# Patient Record
Sex: MALE | Race: WHITE | NOT HISPANIC OR LATINO | Employment: OTHER | URBAN - METROPOLITAN AREA
[De-identification: names, ages, dates, MRNs, and addresses within clinical notes are randomized per-mention and may not be internally consistent; named-entity substitution may affect disease eponyms.]

---

## 2024-10-06 ENCOUNTER — HOSPITAL ENCOUNTER (EMERGENCY)
Facility: HOSPITAL | Age: 69
Discharge: NURSING, REHAB OR LONG-TERM CARE | End: 2024-10-06
Admitting: EMERGENCY MEDICINE
Payer: MEDICAID

## 2024-10-06 ENCOUNTER — APPOINTMENT (OUTPATIENT)
Dept: CT IMAGING | Facility: HOSPITAL | Age: 69
End: 2024-10-06
Payer: MEDICAID

## 2024-10-06 VITALS
DIASTOLIC BLOOD PRESSURE: 92 MMHG | WEIGHT: 156.09 LBS | RESPIRATION RATE: 14 BRPM | OXYGEN SATURATION: 99 % | SYSTOLIC BLOOD PRESSURE: 149 MMHG | HEIGHT: 68 IN | BODY MASS INDEX: 23.66 KG/M2 | HEART RATE: 82 BPM | TEMPERATURE: 97.5 F
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
## 2024-10-06 DIAGNOSIS — S00.81XA ABRASION OF FOREHEAD, INITIAL ENCOUNTER: ICD-10-CM

## 2024-10-06 LAB
ALBUMIN SERPL-MCNC: 3.1 G/DL (ref 3.5–5.2)
ALBUMIN/GLOB SERPL: 1.1 G/DL
ALP SERPL-CCNC: 83 U/L (ref 39–117)
ALT SERPL W P-5'-P-CCNC: <5 U/L (ref 1–41)
AMPHET+METHAMPHET UR QL: NEGATIVE
AMPHETAMINES UR QL: NEGATIVE
ANION GAP SERPL CALCULATED.3IONS-SCNC: 10.3 MMOL/L (ref 5–15)
AST SERPL-CCNC: 23 U/L (ref 1–40)
BARBITURATES UR QL SCN: NEGATIVE
BASOPHILS # BLD AUTO: 0.06 10*3/MM3 (ref 0–0.2)
BASOPHILS NFR BLD AUTO: 0.8 % (ref 0–1.5)
BENZODIAZ UR QL SCN: NEGATIVE
BILIRUB SERPL-MCNC: 0.4 MG/DL (ref 0–1.2)
BILIRUB UR QL STRIP: NEGATIVE
BUN SERPL-MCNC: 8 MG/DL (ref 8–23)
BUN/CREAT SERPL: 7.1 (ref 7–25)
BUPRENORPHINE SERPL-MCNC: NEGATIVE NG/ML
CALCIUM SPEC-SCNC: 8.6 MG/DL (ref 8.6–10.5)
CANNABINOIDS SERPL QL: NEGATIVE
CHLORIDE SERPL-SCNC: 107 MMOL/L (ref 98–107)
CLARITY UR: CLEAR
CO2 SERPL-SCNC: 24.7 MMOL/L (ref 22–29)
COCAINE UR QL: NEGATIVE
COLOR UR: YELLOW
CREAT SERPL-MCNC: 1.12 MG/DL (ref 0.76–1.27)
DEPRECATED RDW RBC AUTO: 56.3 FL (ref 37–54)
EGFRCR SERPLBLD CKD-EPI 2021: 70.7 ML/MIN/1.73
EOSINOPHIL # BLD AUTO: 0.03 10*3/MM3 (ref 0–0.4)
EOSINOPHIL NFR BLD AUTO: 0.4 % (ref 0.3–6.2)
ERYTHROCYTE [DISTWIDTH] IN BLOOD BY AUTOMATED COUNT: 16.7 % (ref 12.3–15.4)
ETHANOL UR QL: <0.01 %
GLOBULIN UR ELPH-MCNC: 2.8 GM/DL
GLUCOSE SERPL-MCNC: 115 MG/DL (ref 65–99)
GLUCOSE UR STRIP-MCNC: NEGATIVE MG/DL
HCT VFR BLD AUTO: 29 % (ref 37.5–51)
HGB BLD-MCNC: 8.9 G/DL (ref 13–17.7)
HGB UR QL STRIP.AUTO: NEGATIVE
IMM GRANULOCYTES # BLD AUTO: 0.05 10*3/MM3 (ref 0–0.05)
IMM GRANULOCYTES NFR BLD AUTO: 0.6 % (ref 0–0.5)
KETONES UR QL STRIP: NEGATIVE
LEUKOCYTE ESTERASE UR QL STRIP.AUTO: NEGATIVE
LYMPHOCYTES # BLD AUTO: 1.16 10*3/MM3 (ref 0.7–3.1)
LYMPHOCYTES NFR BLD AUTO: 14.9 % (ref 19.6–45.3)
MCH RBC QN AUTO: 28.5 PG (ref 26.6–33)
MCHC RBC AUTO-ENTMCNC: 30.7 G/DL (ref 31.5–35.7)
MCV RBC AUTO: 92.9 FL (ref 79–97)
METHADONE UR QL SCN: NEGATIVE
MONOCYTES # BLD AUTO: 0.69 10*3/MM3 (ref 0.1–0.9)
MONOCYTES NFR BLD AUTO: 8.9 % (ref 5–12)
NEUTROPHILS NFR BLD AUTO: 5.8 10*3/MM3 (ref 1.7–7)
NEUTROPHILS NFR BLD AUTO: 74.4 % (ref 42.7–76)
NITRITE UR QL STRIP: NEGATIVE
NRBC BLD AUTO-RTO: 0 /100 WBC (ref 0–0.2)
OPIATES UR QL: NEGATIVE
OXYCODONE UR QL SCN: NEGATIVE
PCP UR QL SCN: NEGATIVE
PH UR STRIP.AUTO: 8 [PH] (ref 5–8)
PLATELET # BLD AUTO: 308 10*3/MM3 (ref 140–450)
PMV BLD AUTO: 11.8 FL (ref 6–12)
POTASSIUM SERPL-SCNC: 3.4 MMOL/L (ref 3.5–5.2)
PROT SERPL-MCNC: 5.9 G/DL (ref 6–8.5)
PROT UR QL STRIP: ABNORMAL
RBC # BLD AUTO: 3.12 10*6/MM3 (ref 4.14–5.8)
SODIUM SERPL-SCNC: 142 MMOL/L (ref 136–145)
SP GR UR STRIP: 1.01 (ref 1–1.03)
TRICYCLICS UR QL SCN: NEGATIVE
UROBILINOGEN UR QL STRIP: ABNORMAL
WBC NRBC COR # BLD AUTO: 7.79 10*3/MM3 (ref 3.4–10.8)

## 2024-10-06 PROCEDURE — P9612 CATHETERIZE FOR URINE SPEC: HCPCS

## 2024-10-06 PROCEDURE — 80053 COMPREHEN METABOLIC PANEL: CPT

## 2024-10-06 PROCEDURE — 99284 EMERGENCY DEPT VISIT MOD MDM: CPT

## 2024-10-06 PROCEDURE — 80306 DRUG TEST PRSMV INSTRMNT: CPT

## 2024-10-06 PROCEDURE — 82077 ASSAY SPEC XCP UR&BREATH IA: CPT

## 2024-10-06 PROCEDURE — 36415 COLL VENOUS BLD VENIPUNCTURE: CPT

## 2024-10-06 PROCEDURE — 85025 COMPLETE CBC W/AUTO DIFF WBC: CPT

## 2024-10-06 PROCEDURE — 70450 CT HEAD/BRAIN W/O DYE: CPT

## 2024-10-06 PROCEDURE — 81003 URINALYSIS AUTO W/O SCOPE: CPT

## 2024-10-06 PROCEDURE — 72125 CT NECK SPINE W/O DYE: CPT

## 2024-10-06 NOTE — ED PROVIDER NOTES
Subjective   History of Present Illness  Patient is a 69-year-old male presenting to ED via EMS from [facility] for multiple unwitnessed falls today.  It is reported that he has fallen 3 times today, one occurrence being outside the facility.  They report a history of alcoholism and are worried he has been drinking.  They are unsure what his baseline is as he is new to the facility.  Patient does have abrasions on the right side of his forehead.  However review of systems is very limited as he is only oriented to self.  He denies any current pain, headache, dizziness.  He is not on blood thinners.        Review of Systems   Unable to perform ROS: Mental status change       No past medical history on file.    No Known Allergies    No past surgical history on file.    No family history on file.    Social History     Socioeconomic History    Marital status:            Objective   Physical Exam  Constitutional:       General: He is not in acute distress.     Appearance: He is not ill-appearing.   HENT:      Head: No raccoon eyes or Hanks's sign.        Comments: Quarter sized abrasion noted above, hemostasis achieved.  No open lacerations or surrounding erythema, edema, ecchymosis.     Right Ear: Drainage present. No tenderness. There is impacted cerumen. Tympanic membrane is not erythematous.      Left Ear: Tympanic membrane normal.      Mouth/Throat:      Mouth: Mucous membranes are moist.   Eyes:      Extraocular Movements: Extraocular movements intact.      Pupils: Pupils are equal, round, and reactive to light.   Cardiovascular:      Rate and Rhythm: Normal rate and regular rhythm.      Pulses: Normal pulses.      Heart sounds: Normal heart sounds.   Pulmonary:      Effort: Pulmonary effort is normal.      Breath sounds: Normal breath sounds.   Abdominal:      General: Abdomen is flat. Bowel sounds are normal.      Palpations: Abdomen is soft.      Tenderness: There is no abdominal tenderness.  "  Musculoskeletal:         General: Normal range of motion.      Cervical back: Normal range of motion. No tenderness.      Right lower leg: No edema.      Left lower leg: No edema.   Skin:     General: Skin is warm and dry.      Capillary Refill: Capillary refill takes less than 2 seconds.   Neurological:      Mental Status: He is alert. He is disoriented.      Sensory: No sensory deficit.      Motor: No weakness.      Coordination: Coordination is intact.      Comments: Oriented to self only   Psychiatric:         Mood and Affect: Mood and affect normal.         Speech: Speech normal.         Behavior: Behavior normal.      Comments: Aware he has some injuries, states they are from working on large rocks that have exploded.         Procedures           ED Course  ED Course as of 10/06/24 1910   Sun Oct 06, 2024   1618 Waiting for patient to go to CT. [EC]   1713 Waiting for CT to be read. [EC]      ED Course User Index  [EC] Miranda Landaverde PA-C      /92   Pulse 82   Temp 97.5 °F (36.4 °C) (Oral)   Resp 14   Ht 172.7 cm (68\")   Wt 70.8 kg (156 lb 1.4 oz)   SpO2 99%   BMI 23.73 kg/m²   Labs Reviewed   COMPREHENSIVE METABOLIC PANEL - Abnormal; Notable for the following components:       Result Value    Glucose 115 (*)     Potassium 3.4 (*)     Total Protein 5.9 (*)     Albumin 3.1 (*)     All other components within normal limits    Narrative:     GFR Normal >60  Chronic Kidney Disease <60  Kidney Failure <15     URINALYSIS W/ MICROSCOPIC IF INDICATED (NO CULTURE) - Abnormal; Notable for the following components:    Protein, UA Trace (*)     All other components within normal limits    Narrative:     Urine microscopic not indicated.   CBC WITH AUTO DIFFERENTIAL - Abnormal; Notable for the following components:    RBC 3.12 (*)     Hemoglobin 8.9 (*)     Hematocrit 29.0 (*)     MCHC 30.7 (*)     RDW 16.7 (*)     RDW-SD 56.3 (*)     Lymphocyte % 14.9 (*)     Immature Grans % 0.6 (*)     All other " components within normal limits   URINE DRUG SCREEN - Normal    Narrative:     Cutoff For Drugs Screened:    Amphetamines               500 ng/ml  Barbiturates               200 ng/ml  Benzodiazepines            150 ng/ml  Cocaine                    150 ng/ml  Methadone                  200 ng/ml  Opiates                    100 ng/ml  Phencyclidine               25 ng/ml  THC                         50 ng/ml  Methamphetamine            500 ng/ml  Tricyclic Antidepressants  300 ng/ml  Oxycodone                  100 ng/ml  Buprenorphine               10 ng/ml    The normal value for all drugs tested is negative. This report includes unconfirmed screening results, with the cutoff values listed, to be used for medical treatment purposes only.  Unconfirmed results must not be used for non-medical purposes such as employment or legal testing.  Clinical consideration should be applied to any drug of abuse test, particularly when unconfirmed results are used.    All urine drugs of abuse requests without chain of custody are for medical screening purposes only.  False positives are possible.     ETHANOL    Narrative:     Plasma Ethanol Clinical Symptoms:    ETOH (%)               Clinical Symptom  .01-.05              No apparent influence  .03-.12              Euphoria, Diminished judgment and attention   .09-.25              Impaired comprehension, Muscle incoordination  .18-.30              Confusion, Staggered gait, Slurred speech  .25-.40              Markedly decreased response to stimuli, unable to stand or                        walk, vomitting, sleep or stupor  .35-.50              Comatose, Anesthesia, Subnormal body temperature       CBC AND DIFFERENTIAL    Narrative:     The following orders were created for panel order CBC & Differential.  Procedure                               Abnormality         Status                     ---------                               -----------         ------                      CBC Auto Differential[[id number]]        Abnormal            Final result                 Please view results for these tests on the individual orders.     Medications - No data to display  CT Head Without Contrast    Result Date: 10/6/2024  No acute intracranial pathology. No cervical spine fracture. Electronically Signed: Shahram Rush MD  10/6/2024 5:14 PM EDT  Workstation ID: [id number]    CT Cervical Spine Without Contrast    Result Date: 10/6/2024  No acute intracranial pathology. No cervical spine fracture. Electronically Signed: Shahram Rush MD  10/6/2024 5:14 PM EDT  Workstation ID: [id number]                                          Medical Decision Making  Patient presented to the ED for the above complaint.    Patient underwent the above exam and evaluation.    While in the ED patient was placed in a gown and IV was established.  CT head and neck was obtained to assess for ICH, fracture, dislocation.  Blood work was obtained to assess for intoxication, electrolyte abnormalities, infection.  On reevaluation patient is resting comfortably, denies any current pain.  Blood work and imaging does not warrant admitting patient to hospitalist group.  Patient will be sent back to [facility] as we believe this is his baseline.    Labs were independently interpreted by myself and deemed remarkable for the following: WBC 7.79, hemoglobin 8.9, hematocrit 29.0, glucose 115, potassium 3.4, negative drug screen, negative ethanol, UA showed no signs of infection.  CT head neck independently interpreted by Dr. Simms and reviewed by myself showing: No acute intracranial pathologies, no cervical fractures or dislocations.  Further interpretation by radiologist noted above.    Appropriate PPE was worn during each patient encounter.    Discussed this patient with Dr. Simms who agrees with plan.      Amount and/or Complexity of Data Reviewed  Labs: ordered.  Radiology: ordered.        Final diagnoses:   Fall, initial  encounter   Abrasion of forehead, initial encounter       ED Disposition  ED Disposition       ED Disposition   Discharge    Condition   Stable    Comment   --               Zaki Fontanez MD  [address] [id number]  [phone]    Schedule an appointment as soon as possible for a visit            Medication List      No changes were made to your prescriptions during this visit.            Miranda Landaverde PA-C  10/06/24 3996

## 2024-10-16 ENCOUNTER — APPOINTMENT (OUTPATIENT)
Dept: CT IMAGING | Facility: HOSPITAL | Age: 69
End: 2024-10-16
Payer: MEDICARE

## 2024-10-16 ENCOUNTER — APPOINTMENT (OUTPATIENT)
Dept: GENERAL RADIOLOGY | Facility: HOSPITAL | Age: 69
End: 2024-10-16
Payer: MEDICARE

## 2024-10-16 ENCOUNTER — HOSPITAL ENCOUNTER (INPATIENT)
Facility: HOSPITAL | Age: 69
LOS: 7 days | Discharge: NURSING, REHAB OR LONG-TERM CARE | End: 2024-10-24
Attending: EMERGENCY MEDICINE | Admitting: STUDENT IN AN ORGANIZED HEALTH CARE EDUCATION/TRAINING PROGRAM
Payer: MEDICARE

## 2024-10-16 DIAGNOSIS — R41.82 ALTERED MENTAL STATUS, UNSPECIFIED ALTERED MENTAL STATUS TYPE: Primary | ICD-10-CM

## 2024-10-16 LAB
ALBUMIN SERPL-MCNC: 3.2 G/DL (ref 3.5–5.2)
ALBUMIN/GLOB SERPL: 1.1 G/DL
ALP SERPL-CCNC: 289 U/L (ref 39–117)
ALT SERPL W P-5'-P-CCNC: 18 U/L (ref 1–41)
AMMONIA BLD-SCNC: 34 UMOL/L (ref 16–60)
AMPHET+METHAMPHET UR QL: NEGATIVE
AMPHETAMINES UR QL: NEGATIVE
ANION GAP SERPL CALCULATED.3IONS-SCNC: 12.2 MMOL/L (ref 5–15)
ARTERIAL PATENCY WRIST A: POSITIVE
AST SERPL-CCNC: 24 U/L (ref 1–40)
ATMOSPHERIC PRESS: ABNORMAL MM[HG]
B PARAPERT DNA SPEC QL NAA+PROBE: NOT DETECTED
B PERT DNA SPEC QL NAA+PROBE: NOT DETECTED
BARBITURATES UR QL SCN: NEGATIVE
BASE EXCESS BLDA CALC-SCNC: -0.6 MMOL/L (ref 0–3)
BASOPHILS # BLD AUTO: 0.07 10*3/MM3 (ref 0–0.2)
BASOPHILS NFR BLD AUTO: 0.8 % (ref 0–1.5)
BDY SITE: ABNORMAL
BENZODIAZ UR QL SCN: POSITIVE
BILIRUB SERPL-MCNC: 1.1 MG/DL (ref 0–1.2)
BILIRUB UR QL STRIP: NEGATIVE
BUN SERPL-MCNC: 10 MG/DL (ref 8–23)
BUN/CREAT SERPL: 8.8 (ref 7–25)
BUPRENORPHINE SERPL-MCNC: NEGATIVE NG/ML
C PNEUM DNA NPH QL NAA+NON-PROBE: NOT DETECTED
CALCIUM SPEC-SCNC: 8.8 MG/DL (ref 8.6–10.5)
CANNABINOIDS SERPL QL: NEGATIVE
CHLORIDE SERPL-SCNC: 109 MMOL/L (ref 98–107)
CK SERPL-CCNC: 156 U/L (ref 20–200)
CLARITY UR: CLEAR
CO2 BLDA-SCNC: 23.5 MMOL/L (ref 22–29)
CO2 SERPL-SCNC: 25.8 MMOL/L (ref 22–29)
COCAINE UR QL: NEGATIVE
COLOR UR: YELLOW
CREAT SERPL-MCNC: 1.14 MG/DL (ref 0.76–1.27)
DEPRECATED RDW RBC AUTO: 55.9 FL (ref 37–54)
EGFRCR SERPLBLD CKD-EPI 2021: 69.2 ML/MIN/1.73
EOSINOPHIL # BLD AUTO: 0.15 10*3/MM3 (ref 0–0.4)
EOSINOPHIL NFR BLD AUTO: 1.8 % (ref 0.3–6.2)
ERYTHROCYTE [DISTWIDTH] IN BLOOD BY AUTOMATED COUNT: 15.9 % (ref 12.3–15.4)
FLUAV SUBTYP SPEC NAA+PROBE: NOT DETECTED
FLUBV RNA ISLT QL NAA+PROBE: NOT DETECTED
GLOBULIN UR ELPH-MCNC: 3 GM/DL
GLUCOSE BLDC GLUCOMTR-MCNC: 70 MG/DL (ref 74–100)
GLUCOSE SERPL-MCNC: 68 MG/DL (ref 65–99)
GLUCOSE UR STRIP-MCNC: NEGATIVE MG/DL
HADV DNA SPEC NAA+PROBE: NOT DETECTED
HCO3 BLDA-SCNC: 22.6 MMOL/L (ref 21–28)
HCOV 229E RNA SPEC QL NAA+PROBE: NOT DETECTED
HCOV HKU1 RNA SPEC QL NAA+PROBE: NOT DETECTED
HCOV NL63 RNA SPEC QL NAA+PROBE: NOT DETECTED
HCOV OC43 RNA SPEC QL NAA+PROBE: NOT DETECTED
HCT VFR BLD AUTO: 29.9 % (ref 37.5–51)
HEMODILUTION: NO
HGB BLD-MCNC: 9.2 G/DL (ref 13–17.7)
HGB UR QL STRIP.AUTO: NEGATIVE
HMPV RNA NPH QL NAA+NON-PROBE: NOT DETECTED
HOLD SPECIMEN: NORMAL
HPIV1 RNA ISLT QL NAA+PROBE: NOT DETECTED
HPIV2 RNA SPEC QL NAA+PROBE: NOT DETECTED
HPIV3 RNA NPH QL NAA+PROBE: NOT DETECTED
HPIV4 P GENE NPH QL NAA+PROBE: NOT DETECTED
IMM GRANULOCYTES # BLD AUTO: 0.03 10*3/MM3 (ref 0–0.05)
IMM GRANULOCYTES NFR BLD AUTO: 0.4 % (ref 0–0.5)
INHALED O2 CONCENTRATION: 21 %
KETONES UR QL STRIP: ABNORMAL
LEUKOCYTE ESTERASE UR QL STRIP.AUTO: NEGATIVE
LYMPHOCYTES # BLD AUTO: 1.02 10*3/MM3 (ref 0.7–3.1)
LYMPHOCYTES NFR BLD AUTO: 12.2 % (ref 19.6–45.3)
M PNEUMO IGG SER IA-ACNC: NOT DETECTED
MAGNESIUM SERPL-MCNC: 1.8 MG/DL (ref 1.6–2.4)
MCH RBC QN AUTO: 29.2 PG (ref 26.6–33)
MCHC RBC AUTO-ENTMCNC: 30.8 G/DL (ref 31.5–35.7)
MCV RBC AUTO: 94.9 FL (ref 79–97)
METHADONE UR QL SCN: NEGATIVE
MODALITY: ABNORMAL
MONOCYTES # BLD AUTO: 0.68 10*3/MM3 (ref 0.1–0.9)
MONOCYTES NFR BLD AUTO: 8.1 % (ref 5–12)
NEUTROPHILS NFR BLD AUTO: 6.44 10*3/MM3 (ref 1.7–7)
NEUTROPHILS NFR BLD AUTO: 76.7 % (ref 42.7–76)
NITRITE UR QL STRIP: NEGATIVE
NRBC BLD AUTO-RTO: 0 /100 WBC (ref 0–0.2)
OPIATES UR QL: NEGATIVE
OXYCODONE UR QL SCN: NEGATIVE
PCO2 BLDA: 30.5 MM HG (ref 35–48)
PCP UR QL SCN: NEGATIVE
PH BLDA: 7.48 PH UNITS (ref 7.35–7.45)
PH UR STRIP.AUTO: 7 [PH] (ref 5–8)
PLATELET # BLD AUTO: 226 10*3/MM3 (ref 140–450)
PMV BLD AUTO: 11.6 FL (ref 6–12)
PO2 BLD: 433 MM[HG] (ref 0–500)
PO2 BLDA: 90.9 MM HG (ref 83–108)
POTASSIUM SERPL-SCNC: 4.1 MMOL/L (ref 3.5–5.2)
PROT SERPL-MCNC: 6.2 G/DL (ref 6–8.5)
PROT UR QL STRIP: NEGATIVE
RBC # BLD AUTO: 3.15 10*6/MM3 (ref 4.14–5.8)
RHINOVIRUS RNA SPEC NAA+PROBE: NOT DETECTED
RSV RNA NPH QL NAA+NON-PROBE: NOT DETECTED
SAO2 % BLDCOA: 97.7 % (ref 94–98)
SARS-COV-2 RNA NPH QL NAA+NON-PROBE: NOT DETECTED
SODIUM SERPL-SCNC: 147 MMOL/L (ref 136–145)
SP GR UR STRIP: 1.02 (ref 1–1.03)
T4 FREE SERPL-MCNC: 1.58 NG/DL (ref 0.93–1.7)
TRICYCLICS UR QL SCN: POSITIVE
TSH SERPL DL<=0.05 MIU/L-ACNC: 2.2 UIU/ML (ref 0.27–4.2)
UROBILINOGEN UR QL STRIP: ABNORMAL
WBC NRBC COR # BLD AUTO: 8.39 10*3/MM3 (ref 3.4–10.8)
WHOLE BLOOD HOLD COAG: NORMAL

## 2024-10-16 PROCEDURE — 84439 ASSAY OF FREE THYROXINE: CPT | Performed by: EMERGENCY MEDICINE

## 2024-10-16 PROCEDURE — 81003 URINALYSIS AUTO W/O SCOPE: CPT | Performed by: EMERGENCY MEDICINE

## 2024-10-16 PROCEDURE — 36600 WITHDRAWAL OF ARTERIAL BLOOD: CPT | Performed by: EMERGENCY MEDICINE

## 2024-10-16 PROCEDURE — 80053 COMPREHEN METABOLIC PANEL: CPT | Performed by: EMERGENCY MEDICINE

## 2024-10-16 PROCEDURE — 82550 ASSAY OF CK (CPK): CPT | Performed by: EMERGENCY MEDICINE

## 2024-10-16 PROCEDURE — 82803 BLOOD GASES ANY COMBINATION: CPT | Performed by: EMERGENCY MEDICINE

## 2024-10-16 PROCEDURE — 84443 ASSAY THYROID STIM HORMONE: CPT | Performed by: EMERGENCY MEDICINE

## 2024-10-16 PROCEDURE — 70450 CT HEAD/BRAIN W/O DYE: CPT

## 2024-10-16 PROCEDURE — 85025 COMPLETE CBC W/AUTO DIFF WBC: CPT | Performed by: EMERGENCY MEDICINE

## 2024-10-16 PROCEDURE — 83735 ASSAY OF MAGNESIUM: CPT | Performed by: EMERGENCY MEDICINE

## 2024-10-16 PROCEDURE — 80306 DRUG TEST PRSMV INSTRMNT: CPT | Performed by: INTERNAL MEDICINE

## 2024-10-16 PROCEDURE — 82948 REAGENT STRIP/BLOOD GLUCOSE: CPT

## 2024-10-16 PROCEDURE — 87040 BLOOD CULTURE FOR BACTERIA: CPT | Performed by: EMERGENCY MEDICINE

## 2024-10-16 PROCEDURE — 87481 CANDIDA DNA AMP PROBE: CPT | Performed by: INTERNAL MEDICINE

## 2024-10-16 PROCEDURE — G0378 HOSPITAL OBSERVATION PER HR: HCPCS

## 2024-10-16 PROCEDURE — 0202U NFCT DS 22 TRGT SARS-COV-2: CPT | Performed by: INTERNAL MEDICINE

## 2024-10-16 PROCEDURE — 99285 EMERGENCY DEPT VISIT HI MDM: CPT

## 2024-10-16 PROCEDURE — 36415 COLL VENOUS BLD VENIPUNCTURE: CPT

## 2024-10-16 PROCEDURE — P9612 CATHETERIZE FOR URINE SPEC: HCPCS

## 2024-10-16 PROCEDURE — 71045 X-RAY EXAM CHEST 1 VIEW: CPT

## 2024-10-16 PROCEDURE — 82140 ASSAY OF AMMONIA: CPT | Performed by: EMERGENCY MEDICINE

## 2024-10-16 RX ORDER — BISACODYL 10 MG
10 SUPPOSITORY, RECTAL RECTAL DAILY PRN
Status: DISCONTINUED | OUTPATIENT
Start: 2024-10-16 | End: 2024-10-24 | Stop reason: HOSPADM

## 2024-10-16 RX ORDER — SODIUM CHLORIDE 9 MG/ML
40 INJECTION, SOLUTION INTRAVENOUS AS NEEDED
Status: DISCONTINUED | OUTPATIENT
Start: 2024-10-16 | End: 2024-10-16

## 2024-10-16 RX ORDER — SODIUM CHLORIDE 0.9 % (FLUSH) 0.9 %
10 SYRINGE (ML) INJECTION EVERY 12 HOURS SCHEDULED
Status: DISCONTINUED | OUTPATIENT
Start: 2024-10-16 | End: 2024-10-16

## 2024-10-16 RX ORDER — DEXTROSE MONOHYDRATE AND SODIUM CHLORIDE 5; .45 G/100ML; G/100ML
75 INJECTION, SOLUTION INTRAVENOUS CONTINUOUS
Status: DISPENSED | OUTPATIENT
Start: 2024-10-16 | End: 2024-10-17

## 2024-10-16 RX ORDER — ONDANSETRON 2 MG/ML
4 INJECTION INTRAMUSCULAR; INTRAVENOUS EVERY 6 HOURS PRN
Status: DISCONTINUED | OUTPATIENT
Start: 2024-10-16 | End: 2024-10-20

## 2024-10-16 RX ORDER — ALUMINA, MAGNESIA, AND SIMETHICONE 2400; 2400; 240 MG/30ML; MG/30ML; MG/30ML
15 SUSPENSION ORAL EVERY 6 HOURS PRN
Status: DISCONTINUED | OUTPATIENT
Start: 2024-10-16 | End: 2024-10-24 | Stop reason: HOSPADM

## 2024-10-16 RX ORDER — ACETAMINOPHEN 160 MG/5ML
650 SOLUTION ORAL EVERY 4 HOURS PRN
Status: DISCONTINUED | OUTPATIENT
Start: 2024-10-16 | End: 2024-10-24 | Stop reason: HOSPADM

## 2024-10-16 RX ORDER — ACETAMINOPHEN 325 MG/1
650 TABLET ORAL EVERY 4 HOURS PRN
Status: DISCONTINUED | OUTPATIENT
Start: 2024-10-16 | End: 2024-10-24 | Stop reason: HOSPADM

## 2024-10-16 RX ORDER — NITROGLYCERIN 0.4 MG/1
0.4 TABLET SUBLINGUAL
Status: DISCONTINUED | OUTPATIENT
Start: 2024-10-16 | End: 2024-10-24 | Stop reason: HOSPADM

## 2024-10-16 RX ORDER — SODIUM CHLORIDE 0.9 % (FLUSH) 0.9 %
10 SYRINGE (ML) INJECTION AS NEEDED
Status: DISCONTINUED | OUTPATIENT
Start: 2024-10-16 | End: 2024-10-24 | Stop reason: HOSPADM

## 2024-10-16 RX ORDER — AMOXICILLIN 250 MG
2 CAPSULE ORAL 2 TIMES DAILY PRN
Status: DISCONTINUED | OUTPATIENT
Start: 2024-10-16 | End: 2024-10-24 | Stop reason: HOSPADM

## 2024-10-16 RX ORDER — NICOTINE POLACRILEX 4 MG
15 LOZENGE BUCCAL
Status: DISCONTINUED | OUTPATIENT
Start: 2024-10-16 | End: 2024-10-24 | Stop reason: HOSPADM

## 2024-10-16 RX ORDER — POLYETHYLENE GLYCOL 3350 17 G/17G
17 POWDER, FOR SOLUTION ORAL DAILY PRN
Status: DISCONTINUED | OUTPATIENT
Start: 2024-10-16 | End: 2024-10-24 | Stop reason: HOSPADM

## 2024-10-16 RX ORDER — ONDANSETRON 4 MG/1
4 TABLET, ORALLY DISINTEGRATING ORAL EVERY 6 HOURS PRN
Status: DISCONTINUED | OUTPATIENT
Start: 2024-10-16 | End: 2024-10-24

## 2024-10-16 RX ORDER — BISACODYL 5 MG/1
5 TABLET, DELAYED RELEASE ORAL DAILY PRN
Status: DISCONTINUED | OUTPATIENT
Start: 2024-10-16 | End: 2024-10-24 | Stop reason: HOSPADM

## 2024-10-16 RX ORDER — DEXTROSE MONOHYDRATE 25 G/50ML
25 INJECTION, SOLUTION INTRAVENOUS
Status: DISCONTINUED | OUTPATIENT
Start: 2024-10-16 | End: 2024-10-24 | Stop reason: HOSPADM

## 2024-10-16 RX ORDER — ACETAMINOPHEN 650 MG/1
650 SUPPOSITORY RECTAL EVERY 4 HOURS PRN
Status: DISCONTINUED | OUTPATIENT
Start: 2024-10-16 | End: 2024-10-24 | Stop reason: HOSPADM

## 2024-10-16 RX ORDER — HYDRALAZINE HYDROCHLORIDE 20 MG/ML
10 INJECTION INTRAMUSCULAR; INTRAVENOUS EVERY 6 HOURS PRN
Status: DISCONTINUED | OUTPATIENT
Start: 2024-10-16 | End: 2024-10-24 | Stop reason: HOSPADM

## 2024-10-16 RX ORDER — SODIUM CHLORIDE 450 MG/100ML
75 INJECTION, SOLUTION INTRAVENOUS CONTINUOUS
Status: DISCONTINUED | OUTPATIENT
Start: 2024-10-16 | End: 2024-10-16

## 2024-10-16 RX ORDER — IBUPROFEN 600 MG/1
1 TABLET ORAL
Status: DISCONTINUED | OUTPATIENT
Start: 2024-10-16 | End: 2024-10-24 | Stop reason: HOSPADM

## 2024-10-16 RX ADMIN — DEXTROSE AND SODIUM CHLORIDE 75 ML/HR: 5; 450 INJECTION, SOLUTION INTRAVENOUS at 20:24

## 2024-10-16 NOTE — LETTER
"    EMS Transport Request  For use at [facility], [place], [place], [place], and [place] only   Patient Name: Shahram Chaney : 1955   Weight:67.3 kg (148 lb 5.9 oz) Pick-up Location: [facility] BLS/ALS: BLS/ALS: BLS   Insurance: MEDICARE Auth End Date:    Pre-Cert #: D/C Summary complete:    Destination: Other [facility].  [facility]  Room    Contact Precautions: {Precautions:48563}   Equipment (O2, Fluids, etc.): {Equipment:82530}   Arrive By Date/Time: *** Stretcher/WC: {Stretcher/WC:63473}   CM Requesting: Elsi Kc RN Ext: ***   Notes/Medical Necessity: ***     ______________________________________________________________________    *Only 2 patient bags OR 1 carry-on size bag are permitted.  Wheelchairs and walkers CANNOT transported with the patient. Acknowledge: {Acknowledge:62600::\"Yes\"}    "

## 2024-10-16 NOTE — Clinical Note
Level of Care: Telemetry [5]   Admitting Physician: JOSE L ZHAO [[id number]]   Attending Physician: JOSE L ZHAO [[id number]]

## 2024-10-16 NOTE — ED PROVIDER NOTES
Subjective   History of Present Illness  Below is from the nursing home as patient is extremely altered    History of present illness 69-year-old gentleman sent in from the nursing home for 3-day history of altered mental status decreased appetite not acting himself.  No recent injury illness has been reported per the staff.  No family is currently present and patient cannot give me any history.  No reported change in medications.      Review of Systems   Unable to perform ROS: Mental status change       No past medical history on file.  Per the nursing home sheet aortic abdominal aneurysm alcohol dependence anxiety hypertension metabolic encephalopathy monoclonal gammopathy unspecified psychosis anemia reflux disease  No Known Allergies    No past surgical history on file.    No family history on file.    Social History     Socioeconomic History    Marital status:      Educations per [facility], Ativan Seroquel aspirin Protonix spironolactone and amantadine atorvastatin mirtazapine losartan melatonin hydralazine Tylenol      Objective   Physical Exam  Constitutional is a 69-year-old awake alert fidgeting all over the bed triage vital signs reviewed blood pressure somewhat elevated.  HEENT extraocular muscles are intact pupils equal round reactive sclera clear I do not appreciate papilledema mouth clear neck supple no adenopathy no meningeal signs no JVD no bruits lungs clear no retraction no use of accessory heart was regular without murmur rub abdomen soft nontender good bowel sounds no peritoneal findings or pulsatile masses extremities pulses equal upper and lower extremities no edema cords or Homans' sign evidence of DVT skin warm dry without rashes or cellulitic changes  examination unremarkable.  Neurologic he is awake alert he is very confused does not answer questions there is no facial asymmetry he does moan at times but there is no slurring of this he has no focal weaknesses he moves  everything constantly in the bed.  Procedures           ED Course      Results for orders placed or performed during the hospital encounter of 10/16/24   Urinalysis With Microscopic If Indicated (No Culture) - Urine, Catheter In/Out    Collection Time: 10/16/24 10:38 AM    Specimen: Urine, Catheter In/Out   Result Value Ref Range    Color, UA Yellow Yellow, Straw    Appearance, UA Clear Clear    pH, UA 7.0 5.0 - 8.0    Specific Gravity, UA 1.016 1.005 - 1.030    Glucose, UA Negative Negative    Ketones, UA 15 mg/dL (1+) (A) Negative    Bilirubin, UA Negative Negative    Blood, UA Negative Negative    Protein, UA Negative Negative    Leuk Esterase, UA Negative Negative    Nitrite, UA Negative Negative    Urobilinogen, UA 1.0 E.U./dL 0.2 - 1.0 E.U./dL   Comprehensive Metabolic Panel    Collection Time: 10/16/24 11:03 AM    Specimen: Arm, Right; Blood   Result Value Ref Range    Glucose 68 65 - 99 mg/dL    BUN 10 8 - 23 mg/dL    Creatinine 1.14 0.76 - 1.27 mg/dL    Sodium 147 (H) 136 - 145 mmol/L    Potassium 4.1 3.5 - 5.2 mmol/L    Chloride 109 (H) 98 - 107 mmol/L    CO2 25.8 22.0 - 29.0 mmol/L    Calcium 8.8 8.6 - 10.5 mg/dL    Total Protein 6.2 6.0 - 8.5 g/dL    Albumin 3.2 (L) 3.5 - 5.2 g/dL    ALT (SGPT) 18 1 - 41 U/L    AST (SGOT) 24 1 - 40 U/L    Alkaline Phosphatase 289 (H) 39 - 117 U/L    Total Bilirubin 1.1 0.0 - 1.2 mg/dL    Globulin 3.0 gm/dL    A/G Ratio 1.1 g/dL    BUN/Creatinine Ratio 8.8 7.0 - 25.0    Anion Gap 12.2 5.0 - 15.0 mmol/L    eGFR 69.2 >60.0 mL/min/1.73   CK    Collection Time: 10/16/24 11:03 AM    Specimen: Arm, Right; Blood   Result Value Ref Range    Creatine Kinase 156 20 - 200 U/L   Ammonia    Collection Time: 10/16/24 11:03 AM    Specimen: Arm, Right; Blood   Result Value Ref Range    Ammonia 34 16 - 60 umol/L   TSH    Collection Time: 10/16/24 11:03 AM    Specimen: Arm, Right; Blood   Result Value Ref Range    TSH 2.200 0.270 - 4.200 uIU/mL   T4, Free    Collection Time: 10/16/24 11:03  AM    Specimen: Arm, Right; Blood   Result Value Ref Range    Free T4 1.58 0.93 - 1.70 ng/dL   Magnesium    Collection Time: 10/16/24 11:03 AM    Specimen: Arm, Right; Blood   Result Value Ref Range    Magnesium 1.8 1.6 - 2.4 mg/dL   CBC Auto Differential    Collection Time: 10/16/24 11:03 AM    Specimen: Arm, Right; Blood   Result Value Ref Range    WBC 8.39 3.40 - 10.80 10*3/mm3    RBC 3.15 (L) 4.14 - 5.80 10*6/mm3    Hemoglobin 9.2 (L) 13.0 - 17.7 g/dL    Hematocrit 29.9 (L) 37.5 - 51.0 %    MCV 94.9 79.0 - 97.0 fL    MCH 29.2 26.6 - 33.0 pg    MCHC 30.8 (L) 31.5 - 35.7 g/dL    RDW 15.9 (H) 12.3 - 15.4 %    RDW-SD 55.9 (H) 37.0 - 54.0 fl    MPV 11.6 6.0 - 12.0 fL    Platelets 226 140 - 450 10*3/mm3    Neutrophil % 76.7 (H) 42.7 - 76.0 %    Lymphocyte % 12.2 (L) 19.6 - 45.3 %    Monocyte % 8.1 5.0 - 12.0 %    Eosinophil % 1.8 0.3 - 6.2 %    Basophil % 0.8 0.0 - 1.5 %    Immature Grans % 0.4 0.0 - 0.5 %    Neutrophils, Absolute 6.44 1.70 - 7.00 10*3/mm3    Lymphocytes, Absolute 1.02 0.70 - 3.10 10*3/mm3    Monocytes, Absolute 0.68 0.10 - 0.90 10*3/mm3    Eosinophils, Absolute 0.15 0.00 - 0.40 10*3/mm3    Basophils, Absolute 0.07 0.00 - 0.20 10*3/mm3    Immature Grans, Absolute 0.03 0.00 - 0.05 10*3/mm3    nRBC 0.0 0.0 - 0.2 /100 WBC   Gold Top - SST    Collection Time: 10/16/24 11:03 AM   Result Value Ref Range    Extra Tube Hold for add-ons.    Light Blue Top    Collection Time: 10/16/24 11:03 AM   Result Value Ref Range    Extra Tube Hold for add-ons.    Blood Gas, Arterial -    Collection Time: 10/16/24  2:26 PM    Specimen: Arterial Blood   Result Value Ref Range    Site Left Radial     Shaan's Test Positive     pH, Arterial 7.477 (H) 7.350 - 7.450 pH units    pCO2, Arterial 30.5 (L) 35.0 - 48.0 mm Hg    pO2, Arterial 90.9 83.0 - 108.0 mm Hg    HCO3, Arterial 22.6 21.0 - 28.0 mmol/L    Base Excess, Arterial -0.6 (L) 0.0 - 3.0 mmol/L    O2 Saturation, Arterial 97.7 94.0 - 98.0 %    CO2 Content 23.5 22 - 29  mmol/L    Barometric Pressure for Blood Gas      Modality Room Air     FIO2 21 %    Hemodilution No     PO2/FIO2 433 0 - 500   POC Glucose Once    Collection Time: 10/16/24  2:26 PM    Specimen: Arterial Blood   Result Value Ref Range    Glucose 70 (L) 74 - 100 mg/dL     CT Head Without Contrast    Result Date: 10/16/2024  Impression: Brain atrophy with chronic small vessel ischemic changes No evidence of acute intracranial abnormality Right mastoid air cell disease Electronically Signed: Chris Martinez MD  10/16/2024 2:49 PM EDT  Workstation ID: [id number]    XR Chest 1 View    Result Date: 10/16/2024  Impression: No acute process. Electronically Signed: Tea Allen MD  10/16/2024 11:20 AM EDT  Workstation ID: [id number]   Medications - No data to display                                 EKG my interpretation normal sinus rhythm rate of 56 normal axis no hypertrophy QTc of 401 Q waves noted in lead III changed from previous EKG on 6/21/2021 abnormal          Medical Decision Making  Medical decision making.  IV established monitor placed my review of sinus rhythm labs obtained by independent reviewed urine was unremarkable comprehensive metabolic profile unremarkable other than a sodium of 147.  Alk phos 289 CK was 156 ammonia normal TSH T4 normal magnesium normal BBC unremarkable and a white count 8.39 hemoglobin 9.2.  Blood gas pH 7.4 pCO2 30 pO2 of 90.  Chest x-ray my independent review no pneumonia pneumothorax failure acute findings radiology review unremarkable CT brain without my independent review no tumors masses hemorrhage or acute findings radiology reviewed showed brain atrophy and chronic small vessel disease no other acute abnormalities right mastoid airspace disease.  Patient repeat exam is unchanged still very confused and fidgeting In bed constantly will not answer questions does not follow commands there is no photophobia neck was supple.  Her vital signs are stable otherwise blood pressure still  little bit elevated at 166/100.  I do not see any evidence of an acute stroke meningitis encephalitis or acute intra-abdominal process or cardiac event cellulitis or sepsis based on my history and physical and clinical finding although not a complete list of all possibilities.  I talked to the hospitalist we discussed the case and the patient will be admitted for further care stable otherwise unremarkable course.    Problems Addressed:  Altered mental status, unspecified altered mental status type: complicated acute illness or injury    Amount and/or Complexity of Data Reviewed  Labs: ordered. Decision-making details documented in ED Course.  Radiology: ordered and independent interpretation performed. Decision-making details documented in ED Course.    Risk  Decision regarding hospitalization.        Final diagnoses:   Altered mental status, unspecified altered mental status type       ED Disposition  ED Disposition       ED Disposition   Decision to Admit    Condition   --    Comment   Level of Care: Telemetry [5]   Admitting Physician: JOSE L ZHAO [[id number]]   Attending Physician: JOSE L ZHAO [[id number]]                 No follow-up provider specified.       Medication List      No changes were made to your prescriptions during this visit.            Olman Finch MD  10/16/24 [id number]

## 2024-10-16 NOTE — LETTER
EMS Transport Request  For use at [facility], [place], [place], [place], and [place] only   Patient Name: Shahram Chaney : 1955   Weight:67.3 kg (148 lb 5.9 oz) Pick-up Location: [facility] BLS/ALS: BLS/ALS: BLS   Insurance: MEDICARE Auth End Date:    Pre-Cert #: D/C Summary complete:    Destination: Other [facility].   Room: [address] [address]   Contact Precautions: Other High Fall Risk   Equipment (O2, Fluids, etc.): None   Arrive By Date/Time: 10/24/24 Stretcher/WC: Stretcher   CM Requesting: Elsi Kc RN Ext: [phone]   Notes/Medical Necessity: Dementia with behavioral disturbance ([place])     ______________________________________________________________________    *Only 2 patient bags OR 1 carry-on size bag are permitted.  Wheelchairs and walkers CANNOT transported with the patient. Acknowledge: Yes

## 2024-10-17 ENCOUNTER — APPOINTMENT (OUTPATIENT)
Dept: CARDIOLOGY | Facility: HOSPITAL | Age: 69
End: 2024-10-17
Payer: MEDICARE

## 2024-10-17 ENCOUNTER — APPOINTMENT (OUTPATIENT)
Dept: MRI IMAGING | Facility: HOSPITAL | Age: 69
End: 2024-10-17
Payer: MEDICARE

## 2024-10-17 PROBLEM — R41.82 ALTERED MENTAL STATUS: Status: ACTIVE | Noted: 2024-10-17

## 2024-10-17 LAB
ANION GAP SERPL CALCULATED.3IONS-SCNC: 13.4 MMOL/L (ref 5–15)
BASOPHILS # BLD AUTO: 0.06 10*3/MM3 (ref 0–0.2)
BASOPHILS NFR BLD AUTO: 0.5 % (ref 0–1.5)
BH CV LOWER VASCULAR LEFT COMMON FEMORAL AUGMENT: NORMAL
BH CV LOWER VASCULAR LEFT COMMON FEMORAL COMPETENT: NORMAL
BH CV LOWER VASCULAR LEFT COMMON FEMORAL COMPRESS: NORMAL
BH CV LOWER VASCULAR LEFT COMMON FEMORAL PHASIC: NORMAL
BH CV LOWER VASCULAR LEFT COMMON FEMORAL SPONT: NORMAL
BH CV LOWER VASCULAR RIGHT COMMON FEMORAL AUGMENT: NORMAL
BH CV LOWER VASCULAR RIGHT COMMON FEMORAL COMPETENT: NORMAL
BH CV LOWER VASCULAR RIGHT COMMON FEMORAL COMPRESS: NORMAL
BH CV LOWER VASCULAR RIGHT COMMON FEMORAL PHASIC: NORMAL
BH CV LOWER VASCULAR RIGHT COMMON FEMORAL SPONT: NORMAL
BH CV LOWER VASCULAR RIGHT DISTAL FEMORAL COMPRESS: NORMAL
BH CV LOWER VASCULAR RIGHT GASTRONEMIUS COMPRESS: NORMAL
BH CV LOWER VASCULAR RIGHT GREATER SAPH AK COMPRESS: NORMAL
BH CV LOWER VASCULAR RIGHT GREATER SAPH BK COMPRESS: NORMAL
BH CV LOWER VASCULAR RIGHT LESSER SAPH COMPRESS: NORMAL
BH CV LOWER VASCULAR RIGHT MID FEMORAL AUGMENT: NORMAL
BH CV LOWER VASCULAR RIGHT MID FEMORAL COMPETENT: NORMAL
BH CV LOWER VASCULAR RIGHT MID FEMORAL COMPRESS: NORMAL
BH CV LOWER VASCULAR RIGHT MID FEMORAL PHASIC: NORMAL
BH CV LOWER VASCULAR RIGHT MID FEMORAL SPONT: NORMAL
BH CV LOWER VASCULAR RIGHT PERONEAL COMPRESS: NORMAL
BH CV LOWER VASCULAR RIGHT POPLITEAL AUGMENT: NORMAL
BH CV LOWER VASCULAR RIGHT POPLITEAL COMPETENT: NORMAL
BH CV LOWER VASCULAR RIGHT POPLITEAL COMPRESS: NORMAL
BH CV LOWER VASCULAR RIGHT POPLITEAL PHASIC: NORMAL
BH CV LOWER VASCULAR RIGHT POPLITEAL SPONT: NORMAL
BH CV LOWER VASCULAR RIGHT POSTERIOR TIBIAL COMPRESS: NORMAL
BH CV LOWER VASCULAR RIGHT PROXIMAL FEMORAL COMPRESS: NORMAL
BH CV LOWER VASCULAR RIGHT SAPHENOFEMORAL JUNCTION COMPRESS: NORMAL
BUN SERPL-MCNC: 11 MG/DL (ref 8–23)
BUN/CREAT SERPL: 11 (ref 7–25)
C AURIS DNA SPEC QL NAA+NON-PROBE: NOT DETECTED
CALCIUM SPEC-SCNC: 8.5 MG/DL (ref 8.6–10.5)
CHLORIDE SERPL-SCNC: 110 MMOL/L (ref 98–107)
CO2 SERPL-SCNC: 22.6 MMOL/L (ref 22–29)
CREAT SERPL-MCNC: 1 MG/DL (ref 0.76–1.27)
DEPRECATED RDW RBC AUTO: 54.9 FL (ref 37–54)
EGFRCR SERPLBLD CKD-EPI 2021: 81 ML/MIN/1.73
EOSINOPHIL # BLD AUTO: 0.04 10*3/MM3 (ref 0–0.4)
EOSINOPHIL NFR BLD AUTO: 0.4 % (ref 0.3–6.2)
ERYTHROCYTE [DISTWIDTH] IN BLOOD BY AUTOMATED COUNT: 15.9 % (ref 12.3–15.4)
GLUCOSE BLDC GLUCOMTR-MCNC: 102 MG/DL (ref 70–105)
GLUCOSE SERPL-MCNC: 94 MG/DL (ref 65–99)
HCT VFR BLD AUTO: 29.5 % (ref 37.5–51)
HGB BLD-MCNC: 9 G/DL (ref 13–17.7)
IMM GRANULOCYTES # BLD AUTO: 0.04 10*3/MM3 (ref 0–0.05)
IMM GRANULOCYTES NFR BLD AUTO: 0.4 % (ref 0–0.5)
LYMPHOCYTES # BLD AUTO: 1.19 10*3/MM3 (ref 0.7–3.1)
LYMPHOCYTES NFR BLD AUTO: 10.9 % (ref 19.6–45.3)
MAGNESIUM SERPL-MCNC: 1.8 MG/DL (ref 1.6–2.4)
MCH RBC QN AUTO: 28.9 PG (ref 26.6–33)
MCHC RBC AUTO-ENTMCNC: 30.5 G/DL (ref 31.5–35.7)
MCV RBC AUTO: 94.9 FL (ref 79–97)
MONOCYTES # BLD AUTO: 1.12 10*3/MM3 (ref 0.1–0.9)
MONOCYTES NFR BLD AUTO: 10.2 % (ref 5–12)
NEUTROPHILS NFR BLD AUTO: 77.6 % (ref 42.7–76)
NEUTROPHILS NFR BLD AUTO: 8.51 10*3/MM3 (ref 1.7–7)
NRBC BLD AUTO-RTO: 0 /100 WBC (ref 0–0.2)
PHOSPHATE SERPL-MCNC: 2.4 MG/DL (ref 2.5–4.5)
PLATELET # BLD AUTO: 236 10*3/MM3 (ref 140–450)
PMV BLD AUTO: 12.7 FL (ref 6–12)
POTASSIUM SERPL-SCNC: 3.9 MMOL/L (ref 3.5–5.2)
RBC # BLD AUTO: 3.11 10*6/MM3 (ref 4.14–5.8)
SODIUM SERPL-SCNC: 146 MMOL/L (ref 136–145)
WBC NRBC COR # BLD AUTO: 10.96 10*3/MM3 (ref 3.4–10.8)

## 2024-10-17 PROCEDURE — 82948 REAGENT STRIP/BLOOD GLUCOSE: CPT

## 2024-10-17 PROCEDURE — 83735 ASSAY OF MAGNESIUM: CPT | Performed by: INTERNAL MEDICINE

## 2024-10-17 PROCEDURE — 80048 BASIC METABOLIC PNL TOTAL CA: CPT | Performed by: INTERNAL MEDICINE

## 2024-10-17 PROCEDURE — 99222 1ST HOSP IP/OBS MODERATE 55: CPT | Performed by: PSYCHIATRY & NEUROLOGY

## 2024-10-17 PROCEDURE — 25010000002 LORAZEPAM PER 2 MG

## 2024-10-17 PROCEDURE — 97166 OT EVAL MOD COMPLEX 45 MIN: CPT

## 2024-10-17 PROCEDURE — 25010000002 HYDRALAZINE PER 20 MG: Performed by: INTERNAL MEDICINE

## 2024-10-17 PROCEDURE — 92610 EVALUATE SWALLOWING FUNCTION: CPT

## 2024-10-17 PROCEDURE — 99223 1ST HOSP IP/OBS HIGH 75: CPT | Performed by: NURSE PRACTITIONER

## 2024-10-17 PROCEDURE — 93971 EXTREMITY STUDY: CPT | Performed by: SURGERY

## 2024-10-17 PROCEDURE — 97162 PT EVAL MOD COMPLEX 30 MIN: CPT

## 2024-10-17 PROCEDURE — 93971 EXTREMITY STUDY: CPT

## 2024-10-17 PROCEDURE — 85025 COMPLETE CBC W/AUTO DIFF WBC: CPT | Performed by: INTERNAL MEDICINE

## 2024-10-17 PROCEDURE — 84100 ASSAY OF PHOSPHORUS: CPT | Performed by: INTERNAL MEDICINE

## 2024-10-17 RX ORDER — ACETAMINOPHEN 325 MG/1
2 TABLET ORAL EVERY 6 HOURS PRN
COMMUNITY
Start: 2024-10-03

## 2024-10-17 RX ORDER — LORAZEPAM 2 MG/ML
2 INJECTION INTRAMUSCULAR ONCE
Status: COMPLETED | OUTPATIENT
Start: 2024-10-18 | End: 2024-10-17

## 2024-10-17 RX ORDER — HYDRALAZINE HYDROCHLORIDE 25 MG/1
1 TABLET, FILM COATED ORAL EVERY 6 HOURS PRN
COMMUNITY
Start: 2024-10-03

## 2024-10-17 RX ORDER — SPIRONOLACTONE 25 MG/1
12.5 TABLET ORAL DAILY
COMMUNITY
Start: 2024-10-03

## 2024-10-17 RX ORDER — MIRTAZAPINE 7.5 MG/1
7.5 TABLET, FILM COATED ORAL NIGHTLY
COMMUNITY
Start: 2024-10-03

## 2024-10-17 RX ORDER — LOSARTAN POTASSIUM 50 MG/1
1 TABLET ORAL EVERY 12 HOURS SCHEDULED
COMMUNITY
Start: 2024-10-03

## 2024-10-17 RX ORDER — MIRTAZAPINE 7.5 MG/1
7.5 TABLET, FILM COATED ORAL NIGHTLY
Status: DISCONTINUED | OUTPATIENT
Start: 2024-10-17 | End: 2024-10-19

## 2024-10-17 RX ORDER — LORAZEPAM 1 MG/1
1 TABLET ORAL EVERY 6 HOURS PRN
COMMUNITY
Start: 2024-10-12

## 2024-10-17 RX ORDER — LOPERAMIDE HYDROCHLORIDE 2 MG/1
2 TABLET ORAL EVERY 6 HOURS PRN
COMMUNITY
Start: 2024-10-03

## 2024-10-17 RX ORDER — ATORVASTATIN CALCIUM 20 MG/1
1 TABLET, FILM COATED ORAL NIGHTLY
COMMUNITY
Start: 2024-10-03

## 2024-10-17 RX ORDER — PANTOPRAZOLE SODIUM 40 MG/1
1 TABLET, DELAYED RELEASE ORAL EVERY 24 HOURS
COMMUNITY
Start: 2024-10-03

## 2024-10-17 RX ORDER — AMANTADINE HYDROCHLORIDE 100 MG/1
1 TABLET ORAL EVERY 12 HOURS SCHEDULED
COMMUNITY
Start: 2024-10-03

## 2024-10-17 RX ADMIN — LORAZEPAM 2 MG: 2 INJECTION, SOLUTION INTRAMUSCULAR; INTRAVENOUS at 23:25

## 2024-10-17 RX ADMIN — Medication 5 MG: at 20:02

## 2024-10-17 RX ADMIN — HYDRALAZINE HYDROCHLORIDE 10 MG: 20 INJECTION INTRAMUSCULAR; INTRAVENOUS at 09:04

## 2024-10-17 RX ADMIN — DEXTROSE AND SODIUM CHLORIDE 75 ML/HR: 5; 450 INJECTION, SOLUTION INTRAVENOUS at 09:04

## 2024-10-17 RX ADMIN — MIRTAZAPINE 7.5 MG: 7.5 TABLET, FILM COATED ORAL at 20:02

## 2024-10-17 NOTE — PROGRESS NOTES
[facility] MEDICINE SERVICE  DAILY PROGRESS NOTE    NAME: Shahram Chaney  : 1955  MRN: [id number]      LOS: 0 days     PROVIDER OF SERVICE: Jose Roberto Mahmood MD    Chief Complaint: AMS (altered mental status)    Subjective:     Interval History:  History taken from: patient    Altered, awake with eyes open, does not reliably follow commands        Review of Systems:   Review of Systems    Objective:     Vital Signs  Temp:  [99.3 °F (37.4 °C)] 99.3 °F (37.4 °C)  Heart Rate:  [] 82  Resp:  [15-20] 15  BP: (120-184)/() 160/95   Body mass index is 22.66 kg/m².    Physical Exam  Physical Exam  Constitutional:       Comments: Chronically ill appearing elderly gentleman   HENT:      Head: Normocephalic and atraumatic.   Cardiovascular:      Rate and Rhythm: Normal rate and regular rhythm.      Pulses: Normal pulses.      Heart sounds: Normal heart sounds.   Pulmonary:      Effort: Pulmonary effort is normal.      Breath sounds: Normal breath sounds.   Abdominal:      General: Abdomen is flat.      Palpations: Abdomen is soft.   Neurological:      General: No focal deficit present.      Mental Status: He is alert.      Comments: Unable to comply with neurological exam.  No meningeal signs.  Normal tone.  No spasticity.            Diagnostic Data    Results from last 7 days   Lab Units 10/17/24  0416 10/16/24  1103   WBC 10*3/mm3 10.96* 8.39   HEMOGLOBIN g/dL 9.0* 9.2*   HEMATOCRIT % 29.5* 29.9*   PLATELETS 10*3/mm3 236 226   GLUCOSE mg/dL 94 68   CREATININE mg/dL 1.00 1.14   BUN mg/dL 11 10   SODIUM mmol/L 146* 147*   POTASSIUM mmol/L 3.9 4.1   AST (SGOT) U/L  --  24   ALT (SGPT) U/L  --  18   ALK PHOS U/L  --  289*   BILIRUBIN mg/dL  --  1.1   ANION GAP mmol/L 13.4 12.2       CT Head Without Contrast    Result Date: 10/16/2024  Impression: Brain atrophy with chronic small vessel ischemic changes No evidence of acute intracranial abnormality Right mastoid air cell disease Electronically Signed: Chris  MD Michelle  10/16/2024 2:49 PM EDT  Workstation ID: [id number]    XR Chest 1 View    Result Date: 10/16/2024  Impression: No acute process. Electronically Signed: Tea Allen MD  10/16/2024 11:20 AM EDT  Workstation ID: [id number]       I reviewed the patient's new clinical results.    Assessment/Plan:     Active and Resolved Problems  Active Hospital Problems    Diagnosis  POA    **AMS (altered mental status) [R41.82]  Yes      Resolved Hospital Problems   No resolved problems to display.       # Altered mental status with an unclear etiology  -pt is sent to ED for physical weakness jerky movement and lethargic decreased intake ( he is AAO 1 baseline )   -I spoke to the family members listed in the chart extensively about his baseline.  Per the family the patient has at baseline and a cognitive disorder and lives in a nursing home but is typically awake alert ambulatory and conversational.  They feel that his cognitive disorder is from decades of drug and alcohol use.  He is more recently sober from this while living in the nursing home.  He has had some progression in his symptoms over the last 18 months but the change to today's behavior is more recent over the last 3 to 4 days per the family.  -He is started on Seroquel and Ativan on oct 11 2024 because of his behavioral episode which is sometimes described as yousif notes but it is very unclear that this patient actually has true bipolar disorder.  After that he started became physically weak lethargic poor intake and jerky.  The medications are forced to have stopped on October 14, 2024.  But he never back to the baseline and still weak and have jerky movement.  -There is some report of aspiration in the nursing home noted by the admitting team.  -Leukocytosis today.  No true fever.  Leukocytosis potentially secondary to the aspiration event that is reported.  Other possibilities should be entertained.  -ammonia is normal   -ABG without retention   -CT head is  stable   -Urinalysis is benign and chest x-ray unimpressive  -Utox with tricyclic antidepressant and benzodiazepine.  -will need to see by PT OT SLP   -Sitter if needed.  -I do not have a clear explanation for his acute change in behavior over the last 3 days.  I will run a viral panel on the patient.  Repeat physical exam including examination of his neck.  Will discuss other possibilities with neurology team today.       #Reported aspiration   -SLP to see   -NPO for now   -Hypoglycemia management    VTE Prophylaxis:  Mechanical VTE prophylaxis orders are present.             Disposition Planning:     Barriers to Discharge: Mental status, workup  Anticipated Date of Discharge: 10/20/2024  Place of Discharge: Return to facility      Time: 50 minutes     There are no questions and answers to display.       Signature: Electronically signed by Jose Roberto Mahmood MD, 10/17/24, 13:25 EDT.  [facility] Hospitalist Team

## 2024-10-17 NOTE — PLAN OF CARE
Goal Outcome Evaluation:              Outcome Evaluation: 68 y/o M who presented with AMS from memory care at [facility]. Diagnosed with acute encephalopathy with underlying cognitive decline. Significant cognitive decline after recent switch in long term care facilities. 3 months ago, pt was at home but had severe malnourishment/dehydration. Went to LTC at that point but recently moved to his current LTC at which point AMS onset. Awaiting MRI brain. Patient has a history of drug and alcohol abuse. Pt is awake and conversational at baseline but confused.  This date pt was oriented to person, place, and year, though very difficult to understand.  He came to EOB with mod asisst, though only sat for about 15 seconds before returning to supine.  He attempted to participate in light ADLs such as wiping mouth and applying chapstick.  He appears to be far from baseline.  Will continue to follow to monitor progress.  Recommend SNF at discharge.    Anticipated Discharge Disposition (OT): skilled nursing facility

## 2024-10-17 NOTE — CONSULTS
Referring Provider: Dr Mcghee  Reason for Consultation: psychosis       Chief complaint the patient was unable to express any pertinent complaints secondary to confusion    Subjective .     History of present illness:  The patient is a 69 y.o. male who was admitted secondary to altered mental status.  Past medical history significant for remote history of alcohol dependence, abdominal aortic aneurysm,  frequent falls, generalized weakness.  Psychiatric consult was requested by  secondary to psychosis.  The patient was very poor historian secondary to cognitive deficits, he knew his name, last name, knew his age, but not oriented to situation,  time or place.   Reportedly, in early October 2024 the patient was manic, he was found outside of the facility, had multiple falls.  When the patient is on his baseline he is alert and oriented x 1, he was observed hallucinating, in the past he was trying to run out of the facility.  The patient was started on Seroquel and Ativan on October 11, 2024, since then he started having episodes of lethargy, decreased appetite.     Today during assessment the patient knew his age and name.  He did not display any combative or aggressive behavior, but he experienced jerking movements, according to the sitter those movements, prominent when he is falling asleep.  The patient did not appear to be responding to internal stimuli.  Past psychiatric history: Alcohol dependence, mood disorder      Review of Systems   Review of systems could not be obtained due to   patient confusion.    History    No past medical history on file.  Abdominal aortic aneurism, alc dependence  Monoclonal gammopathy , anemia     No family history on file.     Social History     Tobacco Use    Smoking status: Unknown   Vaping Use    Vaping status: Never Used          Medications Prior to Admission   Medication Sig Dispense Refill Last Dose/Taking    acetaminophen (Tylenol) 325 MG tablet Take 2 tablets by mouth  Every 6 (Six) Hours As Needed for Mild Pain or Headache.   Past Month    amantadine (SYMMETREL) 100 MG tablet Take 1 tablet by mouth Every 12 (Twelve) Hours.   10/16/2024    atorvastatin (LIPITOR) 20 MG tablet Take 1 tablet by mouth Every Night.   10/16/2024    hydrALAZINE (APRESOLINE) 25 MG tablet Take 1 tablet by mouth Every 6 (Six) Hours As Needed (HTN).   Past Week    loperamide (IMODIUM A-D) 2 MG tablet Take 1 tablet by mouth Every 6 (Six) Hours As Needed for Diarrhea.   Past Month    LORazepam (Ativan) 1 MG tablet Take 1 tablet by mouth Every 6 (Six) Hours As Needed for Anxiety.   10/16/2024    losartan (COZAAR) 50 MG tablet Take 1 tablet by mouth Every 12 (Twelve) Hours.   10/16/2024    Melatonin 5 MG capsule Take 1 capsule by mouth Daily.   10/16/2024    mirtazapine (REMERON) 7.5 MG tablet Take 1 tablet by mouth Every Night.   Past Week    pantoprazole (PROTONIX) 40 MG EC tablet Take 1 tablet by mouth Daily.   10/16/2024    spironolactone (ALDACTONE) 25 MG tablet Take 0.5 tablets by mouth Daily.   10/16/2024        Scheduled Meds:        Continuous Infusions:  dextrose 5 % and sodium chloride 0.45 %, 75 mL/hr, Last Rate: 75 mL/hr (10/17/24 0904)        PRN Meds:    acetaminophen **OR** acetaminophen **OR** acetaminophen    aluminum-magnesium hydroxide-simethicone    senna-docusate sodium **AND** polyethylene glycol **AND** bisacodyl **AND** bisacodyl    Calcium Replacement - Follow Nurse / BPA Driven Protocol    dextrose    dextrose    glucagon (human recombinant)    hydrALAZINE    Magnesium Standard Dose Replacement - Follow Nurse / BPA Driven Protocol    melatonin    nitroglycerin    ondansetron ODT **OR** ondansetron    Phosphorus Replacement - Follow Nurse / BPA Driven Protocol    Potassium Replacement - Follow Nurse / BPA Driven Protocol    sodium chloride      Allergies:  Patient has no known allergies.      Objective     Vital Signs   /95 (BP Location: Right arm, Patient Position: Lying)    "Pulse 82   Temp 99.3 °F (37.4 °C) (Oral)   Resp 15   Ht 172.7 cm (68\")   Wt 67.6 kg (149 lb 0.5 oz)   SpO2 99%   BMI 22.66 kg/m²     Physical Exam:    Musculoskeletal:   Muscle strength and tone: decreased in UE   Abnormal Movements: periodic jerking movements    Gait: unable to assess, the pt was in bed      General Appearance:    In NAD         Mental Status Exam:   Hygiene:   fair  Cooperation:  Cooperative  Eye Contact:  Fair  Behavior and Psychomotor Activity: Slow  Speech:  Dysarthria  Mood:  did not answer  Affect:  Blunted  Thought Process:  Unable to demonstrate  Associations:  unable to assess  Thought Content:   poverty of thoughts   Language: minimal   Suicidal Ideations:   did not express  Homicidal:  None  Hallucinations:  Not demonstrated today  Delusion:  Unable to demonstrate  Orientation:  To person  Memory:  Deficits  Concentration and computation:decreased   Attention span: poor   Fund of knowledge: unable to assess  Reliability:  poor  Insight:  Poor  Judgement:  Impaired  Impulse Control:  Poor      Medications and allergies reviewed      Lab Results   Component Value Date    GLUCOSE 94 10/17/2024    CALCIUM 8.5 (L) 10/17/2024     (H) 10/17/2024    K 3.9 10/17/2024    CO2 22.6 10/17/2024     (H) 10/17/2024    BUN 11 10/17/2024    CREATININE 1.00 10/17/2024    BCR 11.0 10/17/2024    ANIONGAP 13.4 10/17/2024       Last Urine Toxicity  More data may exist         Latest Ref Rng & Units 10/16/2024 10/6/2024   LAST URINE TOXICITY RESULTS   Amphetamine, Urine Qual Negative Negative  Negative    Barbiturates Screen, Urine Negative Negative  Negative    Benzodiazepine Screen, Urine Negative Positive  Negative    Buprenorphine, Screen, Urine Negative Negative  Negative    Cocaine Screen, Urine Negative Negative  Negative    Methadone Screen , Urine Negative Negative  Negative    Methamphetamine, Ur Negative Negative  Negative       Details                   No results found for: " "\"PHENYTOIN\", \"PHENOBARB\", \"VALPROATE\", \"CBMZ\"    Lab Results   Component Value Date     (H) 10/17/2024    BUN 11 10/17/2024    CREATININE 1.00 10/17/2024    TSH 2.200 10/16/2024    WBC 10.96 (H) 10/17/2024       Brief Urine Lab Results  (Last result in the past 365 days)        Color   Clarity   Blood   Leuk Est   Nitrite   Protein   CREAT   Urine HCG        10/16/24 1038 Yellow   Clear   Negative   Negative   Negative   Negative                   Assessment & Plan       AMS (altered mental status)        Assessment: Dementia with behavioral disturbances   Treatment Plan: the pt presented with new onset of physical weakness , reportedly he had cognitive deficits on his baseline (not new)  He was recently started on seroquel that could cause sedation and lethargy but sedation is dose dependent   The pt was also on mirtazapine for mood d/o - cont but decrease dose to 7.5 mg po QHS   Cont to provide support, will follow       Treatment Plan discussed with:  nursing     I discussed the patients findings and my recommendations with nursing staff    I have reviewed and approved the behavioral health treatment plans and problem list. Yes  Thank you for the consult   Referring MD has access to consult report and progress notes in EMR       This document has been electronically signed by Gina Jean MD  October 17, 2024 14:04 EDT    Part of this note may be an electronic transcription/translation of spoken language to printed text using the Dragon Dictation System.      "

## 2024-10-17 NOTE — PLAN OF CARE
Goal Outcome Evaluation:  Plan of Care Reviewed With: patient        Progress: no change  Outcome Evaluation: Patient is still confused. NPO maintained. IV fluid therapy maintained as ordered. Sitter at bedside to keep patient safe.

## 2024-10-17 NOTE — THERAPY EVALUATION
Acute Care - Speech Language Pathology   Swallow Initial Evaluation  Sivakumar     Patient Name: Shahram Chaney  : 1955  MRN: [id number]  Today's Date: 10/17/2024               Admit Date: 10/16/2024    Visit Dx:     ICD-10-CM ICD-9-CM   1. Altered mental status, unspecified altered mental status type  R41.82 780.97     Patient Active Problem List   Diagnosis    AMS (altered mental status)     No past medical history on file.  No past surgical history on file.    SLP Recommendation and Plan  SLP Swallowing Diagnosis: mild-moderate, oral dysphagia, R/O pharyngeal dysphagia (10/17/24 0800)  SLP Diet Recommendation: NPO, ice chips between meals after oral care, with supervision, water between meals after oral care, with supervision (10/17/24 0800)     SLP Rec. for Method of Medication Administration: meds via alternate route (10/17/24 0800)     Monitor for Signs of Aspiration: yes, notify SLP if any concerns (10/17/24 0800)  Recommended Diagnostics: reassess via clinical swallow evaluation, reassess via VFSS ([facility]) (10/17/24 0800)  Swallow Criteria for Skilled Therapeutic Interventions Met: demonstrates skilled criteria (10/17/24 0800)     Rehab Potential/Prognosis, Swallowing: adequate, monitor progress closely (10/17/24 0800)  Therapy Frequency (Swallow): PRN (10/17/24 0800)     Oral Care Recommendations: Oral Care BID/PRN (10/17/24 0800)    Pt seen on this date for bedside swallow evaluation per failing dysphagia screening. Pt is on Mechanical Soft/Thins at [facility]. Per chart review, pt is Aox1 at baseline. Upon entry, pt in bed with sitter at bedside. Sitter assisted in repositioning pt in bed. Pt raised 90 degrees in bed as tolerated. Pt noted to have uncontrolled jerking and shakes. Pt unable to hold cup by himself, required assistance. Oral mech revealed weakness and impaired ROM d/t shakiness/jerks. PO observed: Water by cup x1, water by straw x4, puree x2. Pt with difficulty controlling water  by cup and had anterior spillage, better control with water by straw. Pt with throat clear x2 and cough x1 with first sip of water. Pt required secretions to be suctioned following water. Pt with good oral transit and clearance of puree. Water given following puree resulted in throat clear and gurgly vocal quality. Pt shakes/jerking thought to involved in impairment of swallow as pt visually appeared to have difficulty coordinating his swallow. No further trials given. It is recommended pt remain NPO w/ FWP and meds via alternate route. ST will continue to follow and reassess via CSE or VFSS if indicated.     The rationale to recommend water when a PO diet cannot appropriately/functionally sustain nutrition (or if thickened liquids are prescribed due to aspiration of thin liquids) is because water is low risk for aspiration pna when compared to aspiration of food or other liquids.    Benefits of a water protocol include but are not limited to:     Oral gratification   Engagement of oropharyngeal swallow musculature   Decrease likelihood of dehydration      Guidelines for proper implementation include:  Waiting a minimum of 30 minutes after consuming any other PO   Thorough oral care prior to consuming water  Upright at 90 degree hip flexion  Small sips at slow rate  Monitor for any changes in respiratory status and discontinue if distress noted    The [employer] Free Water Protocol is a research based protocol established in 1984.     SWALLOW EVALUATION (Last 72 Hours)       SLP Adult Swallow Evaluation       Row Name 10/17/24 0800       Rehab Evaluation    Document Type evaluation  -AA    Subjective Information no complaints  -AA    Patient Observations alert;cooperative;agree to therapy  -AA    Patient Effort good  -AA    Symptoms Noted During/After Treatment none  -AA       General Information    Patient Profile Reviewed yes  -AA    Pertinent History Of Current Problem Per ED: Pt is sent to ED for physical weakness  jerky movement and lethargic decreased intake ( he is AAO 1 baseline). Patient has underlying psychiatric disorder. Per the nursing home report, patient has yousif episode, found outside of facility and multiple fall needed going to ED in early October and at that time they could not find the reason for mental status. Patient baseline mental status is AAO x 1 alert to self only.  He can have hallucination.  He is ambulatory in the baseline and he  tried to run off from the facility time to time per the report. Pt baseline diet is Mechanical soft/thin.  -AA    Current Method of Nutrition NPO  -AA    Precautions/Limitations, Vision difficult to assess  -AA    Precautions/Limitations, Hearing difficult to assess  -AA    Prior Level of Function-Communication cognitive-linguistic impairment  -AA    Prior Level of Function-Swallowing thin liquids;mechanical ground textures  -AA    Plans/Goals Discussed with patient  -AA    Barriers to Rehab none identified  -AA       Oral Motor Structure and Function    Dentition Assessment natural, present and adequate  -AA    Secretion Management --  Pt required suctioning  -AA    Mucosal Quality moist, healthy  -AA    Volitional Swallow delayed  -AA    Volitional Cough weak  -AA       Oral Musculature and Cranial Nerve Assessment    Oral Motor General Assessment generalized oral motor weakness  ROM impaired by uncontrollable jerking and shakes.  -AA       General Eating/Swallowing Observations    Respiratory Support Currently in Use room air  -AA    Eating/Swallowing Skills fed by SLP;self-fed  -AA    Positioning During Eating upright in bed;upright 90 degree  -AA    Utensils Used spoon;cup;straw  -AA    Consistencies Trialed thin liquids;pureed  -AA       Clinical Swallow Eval    Oral Prep Phase impaired  -AA    Oral Transit impaired  -AA    Oral Residue WFL  -AA    Pharyngeal Phase suspected pharyngeal impairment  -AA    Esophageal Phase unremarkable  -AA       SLP Evaluation Clinical  Impression    SLP Swallowing Diagnosis mild-moderate;oral dysphagia;R/O pharyngeal dysphagia  -AA    Functional Impact risk of aspiration/pneumonia;risk of malnutrition;risk of dehydration  -AA    Rehab Potential/Prognosis, Swallowing adequate, monitor progress closely  -AA    Swallow Criteria for Skilled Therapeutic Interventions Met demonstrates skilled criteria  -AA       Recommendations    Therapy Frequency (Swallow) PRN  -AA    SLP Diet Recommendation NPO;ice chips between meals after oral care, with supervision;water between meals after oral care, with supervision  -AA    Recommended Diagnostics reassess via clinical swallow evaluation;reassess via VFSS ([facility])  -AA    Oral Care Recommendations Oral Care BID/PRN  -AA    SLP Rec. for Method of Medication Administration meds via alternate route  -AA    Monitor for Signs of Aspiration yes;notify SLP if any concerns  -AA       Swallow Goals (SLP)    Swallow LTGs Swallow Long Term Goal (free text)  -AA       (LTG) Swallow    (LTG) Swallow Patient will participate in ongoing assessment of swallow, including reevaluation of swallow clinically and/or including instrumental assessment of swallow if indicated, to further assess swallow function in anticipation of initiation of PO diet.  -AA    Time Frame (Swallow Long Term Goal) 1 week  -AA    Progress/Outcomes (Swallow Long Term Goal) new goal  -AA              User Key  (r) = Recorded By, (t) = Taken By, (c) = Cosigned By      Initials Name Effective Dates    AA Briana Barroso, BARON 06/18/24 -                     EDUCATION  The patient has been educated in the following areas:   Dysphagia (Swallowing Impairment).        SLP GOALS       Row Name 10/17/24 0800       (LTG) Swallow    (LTG) Swallow Patient will participate in ongoing assessment of swallow, including reevaluation of swallow clinically and/or including instrumental assessment of swallow if indicated, to further assess swallow function in anticipation of  initiation of PO diet.  -AA    Time Frame (Swallow Long Term Goal) 1 week  -AA    Progress/Outcomes (Swallow Long Term Goal) new goal  -AA              User Key  (r) = Recorded By, (t) = Taken By, (c) = Cosigned By      Initials Name Provider Type    AA Briana Barroso SLP Speech and Language Pathologist               BARON Whitlock  10/17/2024

## 2024-10-17 NOTE — CASE MANAGEMENT/SOCIAL WORK
Continued Stay Note  MARTI Littlejohnyd     Patient Name: Shahram Chaney  MRN: [id number]  Today's Date: 10/17/2024    Admit Date: 10/16/2024    Plan: D/C Plan: Return to [facility].  No new PASRR needed; No pre-cert. Transport TBD   Discharge Plan       Row Name 10/17/24 1603       Plan    Plan D/C Plan: Return to [facility].  No new PASRR needed; No pre-cert. Transport TBD                   Discharge Codes    No documentation.                       Elsi Kc RN  RN/.  Office Ph. [phone]  Cell Ph.  [phone]
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
## 2024-10-17 NOTE — PLAN OF CARE
Goal Outcome Evaluation:      Pt having jerk involuntary movements throughout the day. Pt very confused and unable to answer questions. Pt has  at bedside.                                        Bactrim Pregnancy And Lactation Text: This medication is Pregnancy Category D and is known to cause fetal risk.  It is also excreted in breast milk.

## 2024-10-17 NOTE — PLAN OF CARE
Goal Outcome Evaluation:  Plan of Care Reviewed With: patient         70 y/o M who presented with AMS from memory care at [facility]. Diagnosed with acute encephalopathy with underlying cognitive decline. Significant cognitive decline after recent switch in long term care facilities. 3 months ago, pt was at home but had severe malnourishment/dehydration. Went to LTC at that point but recently moved to his current LTC at which point AMS onset. Awaiting MRI brain. Patient has a history of drug and alcohol abuse. Pt is awake and conversational at baseline but confused. Became weak, lethargic and jerky. Patient is confused and partially alert to his name with sitter present this date. He is somewhat able to follow commands. He requires max A for bed mobility. He had posterior verticality, he is not oriented to upright in sitting or standing. He leans posteriorly, keeps his hips flexed to 90 deg and his feet come off the floor. He required mod-max A of 2 to stand with RW. Verticality is also impaired with oblique posture, leaning posteriorly. Able to take sidesteps with max A . Recommending SNF at d/c as this is a significant decline from pts baseline.         Anticipated Discharge Disposition (PT): skilled nursing facility

## 2024-10-17 NOTE — CONSULTS
Primary Care Provider: No ref. provider found     Consult requested by:   Dr. Mcghee     Reason for Consultation: Neurological evaluation, jerky movements     History taken from: Chart, RN     Chief complaint: AMS, lethargy, weakness        SUBJECTIVE:    History of present illness: Background per H&P: Shahram Chaney is a 69 y.o. male with PMH of abdominal aortic aneurysm and alcohol dependence anxiety hypertension monoclonal gammopathy psychosis anemia GERD presents to the hospital with complaints of altered mental status physical weakness lethargy for past few days. Patient urine drug screen is positive for benzodiazepine and TCA.  ABG without any retention.  Blood sugar is 70.  UA without urinary tract infection TSH is fine.  .  Sodium is 147.  CT head without any acute changes.  Chest x-ray without acute process.  RVP pending for now.      I talked with the nurse from the [facility] for detailed report.  Per the nurse, patient was sent to the ED in early October because of yousif and found him outside of the facility and multiple fall.  At that time the ED cannot find the reason for his mental status change and sent back to facility.  He is baseline AAO x 1 alert to self only and hallucination present and was trying to run off from the facility time to time.  Patient was started on Seroquel and Ativan on October 11, 2024 and since then patient started having lethargy decreased appetite and physical weakness ( usually he will try to run off the door ) .  Seroquel and Ativan stopped on October 14 but he is never back to the baseline and that is why they decided to send the patient to the ED for evaluation.  SLP is nursing home downgraded patient diet to puréed diet.     Patient will be admitted for generalized weakness/mental status change.  Will need to be seen by psychiatric PT OT and SLP.    - Portions of the above HPI were copied from previous encounters and edited as appropriate. PMH as detailed below.      Personally spoke to the patient's brother and next of kin, Pelon Chaney.  He came a very extensive history of the patient.  The patient has always had substance abuse problems including alcohol, cocaine, marijuana.  Approximately 5 years ago, family noticed the drive to live.  Patient was unable to pay bills due to financial strain and his home was foreclosed on.  At that time he could not afford to do drugs but continue the alcohol abuse.  [facility] but unfortunately the patient did not want to cooperate.  Patient lived alone and about 3 months ago after the brother came back from vacation he went to check on him and patient was found lying on the couch completely emancipated.  He has been urinating in a bucket by the couch and unable to get up due to lack of strength.  Patient then was taken to the hospital by the family.  Since then he went to [facility] rehab and then afterwards went to a nursing facility.  Family for started noticing cognitive decline about 10 months ago that is been steadily.  Up to 2 to 3 months ago, he was doing fairly decent at the first nursing home with consistent care but when he went to the [facility] at the beginning of the month, family had noticed significant decline cognitively but also psychologically.  2 to 3 months ago he was able to have conversations and knew who the family was but more recently since the move, he has not.  Family does confirm that the confusion and mental status changes are not new but the hallucinations and psychosis along with the slurred speech is fairly new.       Review of Systems   Unable to perform ROS: Acuity of condition           PATIENT HISTORY:  No past medical history on file., No past surgical history on file., No family history on file.,   Social History     Tobacco Use    Smoking status: Unknown   Vaping Use    Vaping status: Never Used   ,   Prior to Admission medications    Medication Sig  Start Date End Date Taking? Authorizing Provider   acetaminophen (Tylenol) 325 MG tablet Take 2 tablets by mouth Every 6 (Six) Hours As Needed for Mild Pain or Headache. 9/19/24  Yes Pau Edwards MD   amantadine (SYMMETREL) 100 MG tablet Take 1 tablet by mouth Every 12 (Twelve) Hours. 9/19/24  Yes Pau Edwards MD   atorvastatin (LIPITOR) 20 MG tablet Take 1 tablet by mouth Every Night. 9/19/24  Yes Pau Edwards MD   hydrALAZINE (APRESOLINE) 25 MG tablet Take 1 tablet by mouth Every 6 (Six) Hours As Needed (HTN). 9/19/24  Yes Pua Edwards MD   loperamide (IMODIUM A-D) 2 MG tablet Take 1 tablet by mouth Every 6 (Six) Hours As Needed for Diarrhea. 9/19/24  Yes Pau Edwards MD   LORazepam (Ativan) 1 MG tablet Take 1 tablet by mouth Every 6 (Six) Hours As Needed for Anxiety. 10/12/24  Yes Pau Edwards MD   losartan (COZAAR) 50 MG tablet Take 1 tablet by mouth Every 12 (Twelve) Hours. 9/19/24  Yes Pau Edwards MD   Melatonin 5 MG capsule Take 1 capsule by mouth Daily. 9/19/24  Yes Pau Edwards MD   mirtazapine (REMERON) 7.5 MG tablet Take 1 tablet by mouth Every Night. 9/19/24  Yes Pau Edwards MD   pantoprazole (PROTONIX) 40 MG EC tablet Take 1 tablet by mouth Daily. 9/20/24  Yes Pau Edwards MD   spironolactone (ALDACTONE) 25 MG tablet Take 0.5 tablets by mouth Daily. 9/20/24  Yes Pau Edwards MD    Allergies:  Patient has no known allergies.    Current Facility-Administered Medications   Medication Dose Route Frequency Provider Last Rate Last Admin    acetaminophen (TYLENOL) tablet 650 mg  650 mg Oral Q4H PRN Isra Mcghee MD        Or    acetaminophen (TYLENOL) 160 MG/5ML oral solution 650 mg  650 mg Oral Q4H PRN Isra Mcghee MD        Or    acetaminophen (TYLENOL) suppository 650 mg  650 mg Rectal Q4H PRN Isra Mcghee MD        aluminum-magnesium hydroxide-simethicone (MAALOX MAX) 400-400-40 MG/5ML suspension 15 mL  15 mL  Oral Q6H PRN Isra Mcghee MD        sennosides-docusate (PERICOLACE) 8.6-50 MG per tablet 2 tablet  2 tablet Oral BID PRN Isra Mchgee MD        And    polyethylene glycol (MIRALAX) packet 17 g  17 g Oral Daily PRN Isra Mcghee MD        And    bisacodyl (DULCOLAX) EC tablet 5 mg  5 mg Oral Daily PRN Isra Mcghee MD        And    bisacodyl (DULCOLAX) suppository 10 mg  10 mg Rectal Daily PRN Isra Mcghee MD        Calcium Replacement - Follow Nurse / BPA Driven Protocol   Does not apply Isra Boswell MD        dextrose (D50W) (25 g/50 mL) IV injection 25 g  25 g Intravenous Q15 Min PRN Isra Mcghee MD        dextrose (GLUTOSE) oral gel 15 g  15 g Oral Q15 Min PRN Isra Mcghee MD        dextrose 5 % and sodium chloride 0.45 % infusion  75 mL/hr Intravenous Continuous Isra Mcghee MD 75 mL/hr at 10/17/24 0904 75 mL/hr at 10/17/24 0904    glucagon (GLUCAGEN) injection 1 mg  1 mg Subcutaneous Q15 Min PRIsra Christie MD        hydrALAZINE (APRESOLINE) injection 10 mg  10 mg Intravenous Q6H PRN Isra Mcghee MD   10 mg at 10/17/24 0904    Magnesium Standard Dose Replacement - Follow Nurse / BPA Driven Protocol   Does not apply Isra Boswell MD        melatonin tablet 5 mg  5 mg Oral Nightly PRIsra Christie MD        nitroglycerin (NITROSTAT) SL tablet 0.4 mg  0.4 mg Sublingual Q5 Min PRN Isra Mcghee MD        ondansetron ODT (ZOFRAN-ODT) disintegrating tablet 4 mg  4 mg Oral Q6H PRN Isra Mcghee MD        Or    ondansetron (ZOFRAN) injection 4 mg  4 mg Intravenous Q6H PRN Isra Mcghee MD        Phosphorus Replacement - Follow Nurse / BPA Driven Protocol   Does not apply Isra Boswell MD        Potassium Replacement - Follow Nurse / BPA Driven Protocol   Does not apply Isra Boswell MD        sodium chloride 0.9 % flush 10 mL  10 mL Intravenous Isra Boswell MD            ________________________________________________________        OBJECTIVE:    PHYSICAL EXAM:    Constitutional: The patient is in no apparent distress,  awake and alert, encephalopathic   Head: Normocephalic, atraumatic.   Chest: No respiratory distress.  Cardiac: Regular rate and rhythm.   Extremities: no swelling. Scattered bruises and abrasions.     NEUROLOGICAL:    Cognition:   Awake, alert  Oriented to name   Eyes open, tracking   Follows simple commands  Exam limited given mental status    Cranial nerves;  No apparent facial asymmetry   Pupils equal and reactive  Tracking   Exam limited given mental status    Patient follows commands     Motor: Antigravity in all extremities.  Withdraws to pain in all extremities. Exam limited given mental status    Cerebellar and gait not tested given patient's mental status    Physical exam performed by ALEKSANDR Mcgill.     ________________________________________________________   RESULTS REVIEW:    VITAL SIGNS:   Temp:  [99.3 °F (37.4 °C)] 99.3 °F (37.4 °C)  Heart Rate:  [] 82  Resp:  [15-20] 15  BP: (120-184)/() 160/95     LABS:      Lab 10/17/24  0416 10/16/24  1103   WBC 10.96* 8.39   HEMOGLOBIN 9.0* 9.2*   HEMATOCRIT 29.5* 29.9*   PLATELETS 236 226   NEUTROS ABS 8.51* 6.44   IMMATURE GRANS (ABS) 0.04 0.03   LYMPHS ABS 1.19 1.02   MONOS ABS 1.12* 0.68   EOS ABS 0.04 0.15   MCV 94.9 94.9         Lab 10/17/24  0416 10/16/24  1103   SODIUM 146* 147*   POTASSIUM 3.9 4.1   CHLORIDE 110* 109*   CO2 22.6 25.8   ANION GAP 13.4 12.2   BUN 11 10   CREATININE 1.00 1.14   EGFR 81.0 69.2   GLUCOSE 94 68   CALCIUM 8.5* 8.8   MAGNESIUM 1.8 1.8   PHOSPHORUS 2.4*  --    TSH  --  2.200         Lab 10/16/24  1103   TOTAL PROTEIN 6.2   ALBUMIN 3.2*   GLOBULIN 3.0   ALT (SGPT) 18   AST (SGOT) 24   BILIRUBIN 1.1   ALK PHOS 289*                     Lab 10/16/24  1426   PH, ARTERIAL 7.477*   PCO2, ARTERIAL 30.5*   PO2 ART 90.9   O2 SATURATION ART 97.7   FIO2 21   HCO3 ART 22.6   BASE EXCESS ART -0.6*     UA          10/6/2024    16:54 10/16/2024    10:38   Urinalysis   Specific Gravity, UA 1.010  1.016    Ketones, UA Negative  15  mg/dL (1+)    Blood, UA Negative  Negative    Leukocytes, UA Negative  Negative    Nitrite, UA Negative  Negative        Lab Results   Component Value Date    TSH 2.200 10/16/2024       IMAGING STUDIES:  CT Head Without Contrast    Result Date: 10/16/2024  Impression: Brain atrophy with chronic small vessel ischemic changes No evidence of acute intracranial abnormality Right mastoid air cell disease Electronically Signed: Chris Martinez MD  10/16/2024 2:49 PM EDT  Workstation ID: [id number]    XR Chest 1 View    Result Date: 10/16/2024  Impression: No acute process. Electronically Signed: Tea Allen MD  10/16/2024 11:20 AM EDT  Workstation ID: [id number]     I reviewed the patient's new clinical results.    ________________________________________________________     PROBLEM LIST:    AMS (altered mental status)            ASSESSMENT/PLAN:  Acute encephalopathy with underlying cognitive decline         According to family, the patient has had cognitive decline that was obvious to them about 10 months ago. HE was hospitalized 3 months ago for severe malnourishment/dehydration. Patient lived alone prior to that event. Afterwards, he went ot a nursing facility but recently switched to another nursing home. Since the switch, the patient has had significant cognitive decline including psychosis.   - Check MRI brain to rule out a lesion- pt does have significant dysarthria on exam   - NCCT was negative for acute finding. Pt had significant brain atrophy and small vessel disease.   - Avoid amantadine d/t side effects including psychosis (yousif) and confusion in elderly   - psychiatry following   - Hx of drug and alcohol abuse   - Will defer LP at this time   - PT/OT          I discussed the patient's findings and my recommendations with patient and family    NANCY Hodge  10/17/24  12:18 EDT

## 2024-10-17 NOTE — THERAPY EVALUATION
Patient Name: Shahram Chaney  : 1955    MRN: [id number]                              Today's Date: 10/17/2024       Admit Date: 10/16/2024    Visit Dx:     ICD-10-CM ICD-9-CM   1. Altered mental status, unspecified altered mental status type  R41.82 780.97     Patient Active Problem List   Diagnosis    AMS (altered mental status)    Altered mental status     No past medical history on file.  No past surgical history on file.   General Information       Row Name 10/17/24 1429          Physical Therapy Time and Intention    Document Type evaluation  -     Mode of Treatment physical therapy  -       Row Name 10/17/24 1429          General Information    Patient Profile Reviewed yes  -     Prior Level of Function --  PLOF unclear, pt is poor historian  -       Row Name 10/17/24 1429          Living Environment    People in Home --  from memory care  -       Row Name 10/17/24 1429          Cognition    Orientation Status (Cognition) unable/difficult to assess  speech is hard to discern, patient could say his last name but unclear if he knew his first name, not aware of his location, situation or time  -       Row Name 10/17/24 1429          Safety Issues/Impairments Affecting Functional Mobility    Safety Issues Affecting Function (Mobility) ability to follow commands;judgment;impulsivity;insight into deficits/self-awareness;problem-solving;safety precautions follow-through/compliance;sequencing abilities  -     Impairments Affecting Function (Mobility) balance;cognition;motor control;endurance/activity tolerance;coordination;motor planning;muscle tone abnormal;postural/trunk control;sensation/sensory awareness;strength  -               User Key  (r) = Recorded By, (t) = Taken By, (c) = Cosigned By      Initials Name Provider Type     Xiomy Dunlap PT Physical Therapist                   Mobility       Row Name 10/17/24 1509          Bed Mobility    Bed Mobility bed mobility (all) activities   -     All Activities, Patrick (Bed Mobility) maximum assist (25% patient effort)  -       Row Name 10/17/24 1509          Sit-Stand Transfer    Sit-Stand Patrick (Transfers) maximum assist (25% patient effort);2 person assist  -     Assistive Device (Sit-Stand Transfers) walker, front-wheeled  -     Comment, (Sit-Stand Transfer) significant posterior lean when sitting EOB or standing  -               User Key  (r) = Recorded By, (t) = Taken By, (c) = Cosigned By      Initials Name Provider Type     Xiomy Dunlap PT Physical Therapist                   Obj/Interventions       Row Name 10/17/24 1509          Range of Motion Comprehensive    Comment, General Range of Motion patient is generally rigid  -       Row Name 10/17/24 1509          Strength Comprehensive (MMT)    Comment, General Manual Muscle Testing (MMT) Assessment B LE >3/5, passed active SLR test  -       Row Name 10/17/24 1509          Balance    Balance Assessment sitting static balance;standing static balance  -     Static Sitting Balance moderate assist  -     Static Standing Balance maximum assist  -     Comment, Balance verticality impaired, patient tilts COM backward  -       Row Name 10/17/24 1509          Sensory Assessment (Somatosensory)    Sensory Assessment (Somatosensory) unable/difficult to assess  -               User Key  (r) = Recorded By, (t) = Taken By, (c) = Cosigned By      Initials Name Provider Type     Xiomy Dunlap PT Physical Therapist                   Goals/Plan       Row Name 10/17/24 1523          Bed Mobility Goal 1 (PT)    Activity/Assistive Device (Bed Mobility Goal 1, PT) bed mobility activities, all  -     Patrick Level/Cues Needed (Bed Mobility Goal 1, PT) modified independence  -     Time Frame (Bed Mobility Goal 1, PT) long term goal (LTG);2 weeks  -       Row Name 10/17/24 3240          Transfer Goal 1 (PT)    Activity/Assistive Device (Transfer Goal 1,  PT) transfers, all  -SS     [place] Level/Cues Needed (Transfer Goal 1, PT) modified independence  -SS     Time Frame (Transfer Goal 1, PT) long term goal (LTG);2 weeks  -       Row Name 10/17/24 1521          Gait Training Goal 1 (PT)    Activity/Assistive Device (Gait Training Goal 1, PT) gait (walking locomotion);walker, rolling  -SS     [place] Level (Gait Training Goal 1, PT) modified independence  -SS     Time Frame (Gait Training Goal 1, PT) long term goal (LTG);2 weeks  -       Row Name 10/17/24 1521          Therapy Assessment/Plan (PT)    Planned Therapy Interventions (PT) balance training;bed mobility training;gait training;transfer training;strengthening;patient/family education  -               User Key  (r) = Recorded By, (t) = Taken By, (c) = Cosigned By      Initials Name Provider Type     Xiomy Dunlap, PT Physical Therapist                   Clinical Impression       Row Name 10/17/24 1520          Pain    Additional Documentation Pain Scale: FACES Pre/Post-Treatment (Group)  -       Row Name 10/17/24 1520          Pain Scale: FACES Pre/Post-Treatment    Pain: FACES Scale, Pretreatment 0-->no hurt  -     Posttreatment Pain Rating 0-->no hurt  -SS       Row Name 10/17/24 1632 10/17/24 1520       Plan of Care Review    Plan of Care Reviewed With -- patient  -SS    Outcome Evaluation 68 y/o M who presented with AMS from memory care at [facility]. Diagnosed with acute encephalopathy with underlying cognitive decline. Significant cognitive decline after recent switch in long term care facilities. 3 months ago, pt was at home but had severe malnourishment/dehydration. Went to LTC at that point but recently moved to his current LTC at which point AMS onset. Awaiting MRI brain. Patient has a history of drug and alcohol abuse. Pt is awake and conversational at baseline but confused. Became weak, lethargic and jerky. Patient is confused and partially alert to his name with  sitter present this date. He is somewhat able to follow commands. He requires max A for bed mobility. He had posterior verticality, he is not oriented to upright in sitting or standing. He leans posteriorly, keeps his hips flexed to 90 deg and his feet come off the floor. He required mod-max A of 2 to stand with RW. Verticality is also impaired with oblique posture, leaning posteriorly. Able to take sidesteps with max A . Recommending SNF at d/c as this is a significant decline from pts baseline.  -SS --      Row Name 10/17/24 1520          Therapy Assessment/Plan (PT)    Rehab Potential (PT) fair  -SS     Criteria for Skilled Interventions Met (PT) yes;meets criteria  -SS     Therapy Frequency (PT) 5 times/wk  -SS     Predicted Duration of Therapy Intervention (PT) until d/c  -SS       Row Name 10/17/24 1520          Positioning and Restraints    Pre-Treatment Position in bed  -SS     Post Treatment Position bed  -SS     In Bed exit alarm on  sitter present  -SS               User Key  (r) = Recorded By, (t) = Taken By, (c) = Cosigned By      Initials Name Provider Type    SS Xiomy Dunlap, PT Physical Therapist                   Outcome Measures       Row Name 10/17/24 1015 10/17/24 0830       How much help from another person do you currently need...    Turning from your back to your side while in flat bed without using bedrails? 3  -SS 3  -SS    Moving from lying on back to sitting on the side of a flat bed without bedrails? 3  -SS 3  -SS    Moving to and from a bed to a chair (including a wheelchair)? 2  -SS 2  -SS    Standing up from a chair using your arms (e.g., wheelchair, bedside chair)? 2  -SS 2  -SS    Climbing 3-5 steps with a railing? 2  -SS 2  -SS    To walk in hospital room? 2  -SS 2  -SS    AM-PAC 6 Clicks Score (PT) 14  -SS 14  -SS              User Key  (r) = Recorded By, (t) = Taken By, (c) = Cosigned By      Initials Name Provider Type    SS Sanjana Kc, RN Registered Nurse                                  Physical Therapy Education       Title: PT OT SLP Therapies (Done)       Topic: Physical Therapy (Done)       Point: Mobility training (Done)       Learning Progress Summary            Patient Acceptance, E, VU by  at 10/17/2024 1522                                      User Key       Initials Effective Dates Name Provider Type Discipline     06/16/21 -  Xiomy Dunlap, PT Physical Therapist PT                  PT Recommendation and Plan  Planned Therapy Interventions (PT): balance training, bed mobility training, gait training, transfer training, strengthening, patient/family education  Outcome Evaluation: 70 y/o M who presented with AMS from memory care at [facility]. Diagnosed with acute encephalopathy with underlying cognitive decline. Significant cognitive decline after recent switch in long term care facilities. 3 months ago, pt was at home but had severe malnourishment/dehydration. Went to LTC at that point but recently moved to his current LTC at which point AMS onset. Awaiting MRI brain. Patient has a history of drug and alcohol abuse. Pt is awake and conversational at baseline but confused. Became weak, lethargic and jerky. Patient is confused and partially alert to his name with sitter present this date. He is somewhat able to follow commands. He requires max A for bed mobility. He had posterior verticality, he is not oriented to upright in sitting or standing. He leans posteriorly, keeps his hips flexed to 90 deg and his feet come off the floor. He required mod-max A of 2 to stand with RW. Verticality is also impaired with oblique posture, leaning posteriorly. Able to take sidesteps with max A . Recommending SNF at d/c as this is a significant decline from pts baseline.     Time Calculation:   PT Evaluation Complexity  History, PT Evaluation Complexity: 3 or more personal factors and/or comorbidities  Examination of Body Systems (PT Eval Complexity): total of 3 or  more elements  Clinical Presentation (PT Evaluation Complexity): evolving  Clinical Decision Making (PT Evaluation Complexity): moderate complexity  Overall Complexity (PT Evaluation Complexity): moderate complexity     PT Charges       Row Name 10/17/24 1522             Time Calculation    Start Time 1346  -      Stop Time 1411  -      Time Calculation (min) 25 min  -      PT Received On 10/17/24  -      PT - Next Appointment 10/18/24  -      PT Goal Re-Cert Due Date 10/31/24  -         Time Calculation- PT    Total Timed Code Minutes- PT 0 minute(s)  -                User Key  (r) = Recorded By, (t) = Taken By, (c) = Cosigned By      Initials Name Provider Type     Ximoy Dunlap, PT Physical Therapist                  Therapy Charges for Today       Code Description Service Date Service Provider Modifiers Qty    [id number] HC PT EVAL MOD COMPLEXITY 4 10/17/2024 Xiomy Dunalp PT GP 1            PT G-Codes  AM-PAC 6 Clicks Score (PT): 14  PT Discharge Summary  Anticipated Discharge Disposition (PT): skilled nursing facility    Xiomy Dunlap PT  10/17/2024

## 2024-10-18 ENCOUNTER — APPOINTMENT (OUTPATIENT)
Dept: CT IMAGING | Facility: HOSPITAL | Age: 69
End: 2024-10-18
Payer: MEDICARE

## 2024-10-18 ENCOUNTER — APPOINTMENT (OUTPATIENT)
Dept: MRI IMAGING | Facility: HOSPITAL | Age: 69
End: 2024-10-18
Payer: MEDICARE

## 2024-10-18 LAB
ANION GAP SERPL CALCULATED.3IONS-SCNC: 9.8 MMOL/L (ref 5–15)
BASOPHILS # BLD AUTO: 0.04 10*3/MM3 (ref 0–0.2)
BASOPHILS NFR BLD AUTO: 0.3 % (ref 0–1.5)
BUN SERPL-MCNC: 10 MG/DL (ref 8–23)
BUN/CREAT SERPL: 11.6 (ref 7–25)
CALCIUM SPEC-SCNC: 8.6 MG/DL (ref 8.6–10.5)
CHLORIDE SERPL-SCNC: 108 MMOL/L (ref 98–107)
CO2 SERPL-SCNC: 24.2 MMOL/L (ref 22–29)
CREAT SERPL-MCNC: 0.86 MG/DL (ref 0.76–1.27)
DEPRECATED RDW RBC AUTO: 54 FL (ref 37–54)
EGFRCR SERPLBLD CKD-EPI 2021: 93.1 ML/MIN/1.73
EOSINOPHIL # BLD AUTO: 0.02 10*3/MM3 (ref 0–0.4)
EOSINOPHIL NFR BLD AUTO: 0.2 % (ref 0.3–6.2)
ERYTHROCYTE [DISTWIDTH] IN BLOOD BY AUTOMATED COUNT: 15.4 % (ref 12.3–15.4)
GLUCOSE BLDC GLUCOMTR-MCNC: 112 MG/DL (ref 70–105)
GLUCOSE BLDC GLUCOMTR-MCNC: 12 MG/DL (ref 70–105)
GLUCOSE BLDC GLUCOMTR-MCNC: 15 MG/DL (ref 70–105)
GLUCOSE BLDC GLUCOMTR-MCNC: 154 MG/DL (ref 70–105)
GLUCOSE BLDC GLUCOMTR-MCNC: 99 MG/DL (ref 70–105)
GLUCOSE SERPL-MCNC: 98 MG/DL (ref 65–99)
HCT VFR BLD AUTO: 29.9 % (ref 37.5–51)
HGB BLD-MCNC: 9.4 G/DL (ref 13–17.7)
IMM GRANULOCYTES # BLD AUTO: 0.06 10*3/MM3 (ref 0–0.05)
IMM GRANULOCYTES NFR BLD AUTO: 0.5 % (ref 0–0.5)
LYMPHOCYTES # BLD AUTO: 1.3 10*3/MM3 (ref 0.7–3.1)
LYMPHOCYTES NFR BLD AUTO: 10.8 % (ref 19.6–45.3)
MAGNESIUM SERPL-MCNC: 1.7 MG/DL (ref 1.6–2.4)
MCH RBC QN AUTO: 29.7 PG (ref 26.6–33)
MCHC RBC AUTO-ENTMCNC: 31.4 G/DL (ref 31.5–35.7)
MCV RBC AUTO: 94.3 FL (ref 79–97)
MONOCYTES # BLD AUTO: 1.23 10*3/MM3 (ref 0.1–0.9)
MONOCYTES NFR BLD AUTO: 10.2 % (ref 5–12)
NEUTROPHILS NFR BLD AUTO: 78 % (ref 42.7–76)
NEUTROPHILS NFR BLD AUTO: 9.44 10*3/MM3 (ref 1.7–7)
NRBC BLD AUTO-RTO: 0 /100 WBC (ref 0–0.2)
PHOSPHATE SERPL-MCNC: 2.3 MG/DL (ref 2.5–4.5)
PLATELET # BLD AUTO: 224 10*3/MM3 (ref 140–450)
PMV BLD AUTO: 12.1 FL (ref 6–12)
POTASSIUM SERPL-SCNC: 3.8 MMOL/L (ref 3.5–5.2)
RBC # BLD AUTO: 3.17 10*6/MM3 (ref 4.14–5.8)
SODIUM SERPL-SCNC: 142 MMOL/L (ref 136–145)
WBC NRBC COR # BLD AUTO: 12.09 10*3/MM3 (ref 3.4–10.8)

## 2024-10-18 PROCEDURE — 85025 COMPLETE CBC W/AUTO DIFF WBC: CPT | Performed by: INTERNAL MEDICINE

## 2024-10-18 PROCEDURE — 25510000001 IOPAMIDOL PER 1 ML: Performed by: STUDENT IN AN ORGANIZED HEALTH CARE EDUCATION/TRAINING PROGRAM

## 2024-10-18 PROCEDURE — 82948 REAGENT STRIP/BLOOD GLUCOSE: CPT

## 2024-10-18 PROCEDURE — 70496 CT ANGIOGRAPHY HEAD: CPT

## 2024-10-18 PROCEDURE — 25010000002 LORAZEPAM PER 2 MG: Performed by: STUDENT IN AN ORGANIZED HEALTH CARE EDUCATION/TRAINING PROGRAM

## 2024-10-18 PROCEDURE — A9579 GAD-BASE MR CONTRAST NOS,1ML: HCPCS | Performed by: STUDENT IN AN ORGANIZED HEALTH CARE EDUCATION/TRAINING PROGRAM

## 2024-10-18 PROCEDURE — 25010000002 DIAZEPAM PER 5 MG

## 2024-10-18 PROCEDURE — 83735 ASSAY OF MAGNESIUM: CPT | Performed by: INTERNAL MEDICINE

## 2024-10-18 PROCEDURE — 92526 ORAL FUNCTION THERAPY: CPT

## 2024-10-18 PROCEDURE — 25010000002 THIAMINE HCL 200 MG/2ML SOLUTION: Performed by: NURSE PRACTITIONER

## 2024-10-18 PROCEDURE — 82948 REAGENT STRIP/BLOOD GLUCOSE: CPT | Performed by: STUDENT IN AN ORGANIZED HEALTH CARE EDUCATION/TRAINING PROGRAM

## 2024-10-18 PROCEDURE — 80048 BASIC METABOLIC PNL TOTAL CA: CPT | Performed by: INTERNAL MEDICINE

## 2024-10-18 PROCEDURE — 70553 MRI BRAIN STEM W/O & W/DYE: CPT

## 2024-10-18 PROCEDURE — 99232 SBSQ HOSP IP/OBS MODERATE 35: CPT | Performed by: NURSE PRACTITIONER

## 2024-10-18 PROCEDURE — 25010000002 MIDAZOLAM PER 1 MG

## 2024-10-18 PROCEDURE — 25010000002 GADOTERIDOL PER 1 ML: Performed by: STUDENT IN AN ORGANIZED HEALTH CARE EDUCATION/TRAINING PROGRAM

## 2024-10-18 PROCEDURE — 84100 ASSAY OF PHOSPHORUS: CPT | Performed by: INTERNAL MEDICINE

## 2024-10-18 PROCEDURE — 70498 CT ANGIOGRAPHY NECK: CPT

## 2024-10-18 PROCEDURE — 25010000002 HYDRALAZINE PER 20 MG: Performed by: INTERNAL MEDICINE

## 2024-10-18 RX ORDER — DIAZEPAM 10 MG/2ML
5 INJECTION, SOLUTION INTRAMUSCULAR; INTRAVENOUS ONCE
Status: COMPLETED | OUTPATIENT
Start: 2024-10-18 | End: 2024-10-18

## 2024-10-18 RX ORDER — DEXTROSE MONOHYDRATE AND SODIUM CHLORIDE 5; .45 G/100ML; G/100ML
75 INJECTION, SOLUTION INTRAVENOUS CONTINUOUS
Status: DISPENSED | OUTPATIENT
Start: 2024-10-18 | End: 2024-10-19

## 2024-10-18 RX ORDER — THIAMINE HYDROCHLORIDE 100 MG/ML
100 INJECTION, SOLUTION INTRAMUSCULAR; INTRAVENOUS ONCE
Status: COMPLETED | OUTPATIENT
Start: 2024-10-18 | End: 2024-10-18

## 2024-10-18 RX ORDER — DEXTROSE MONOHYDRATE 50 MG/ML
50 INJECTION, SOLUTION INTRAVENOUS CONTINUOUS
Status: DISCONTINUED | OUTPATIENT
Start: 2024-10-18 | End: 2024-10-18

## 2024-10-18 RX ORDER — LORAZEPAM 2 MG/ML
2 INJECTION INTRAMUSCULAR ONCE
Status: COMPLETED | OUTPATIENT
Start: 2024-10-18 | End: 2024-10-18

## 2024-10-18 RX ORDER — MIDAZOLAM HYDROCHLORIDE 1 MG/ML
1 INJECTION INTRAMUSCULAR; INTRAVENOUS ONCE
Status: DISCONTINUED | OUTPATIENT
Start: 2024-10-18 | End: 2024-10-18

## 2024-10-18 RX ORDER — IOPAMIDOL 755 MG/ML
100 INJECTION, SOLUTION INTRAVASCULAR
Status: COMPLETED | OUTPATIENT
Start: 2024-10-18 | End: 2024-10-18

## 2024-10-18 RX ORDER — ASPIRIN 81 MG/1
81 TABLET, CHEWABLE ORAL DAILY
COMMUNITY
Start: 2024-10-03

## 2024-10-18 RX ORDER — AMANTADINE HYDROCHLORIDE 100 MG/1
100 CAPSULE, GELATIN COATED ORAL EVERY 12 HOURS SCHEDULED
Status: DISCONTINUED | OUTPATIENT
Start: 2024-10-18 | End: 2024-10-19

## 2024-10-18 RX ORDER — MIDAZOLAM HYDROCHLORIDE 1 MG/ML
1 INJECTION INTRAMUSCULAR; INTRAVENOUS ONCE
Status: COMPLETED | OUTPATIENT
Start: 2024-10-18 | End: 2024-10-18

## 2024-10-18 RX ADMIN — DEXTROSE AND SODIUM CHLORIDE 50 ML/HR: 5; 450 INJECTION, SOLUTION INTRAVENOUS at 20:03

## 2024-10-18 RX ADMIN — LORAZEPAM 2 MG: 2 INJECTION INTRAMUSCULAR; INTRAVENOUS at 09:39

## 2024-10-18 RX ADMIN — HYDRALAZINE HYDROCHLORIDE 10 MG: 20 INJECTION INTRAMUSCULAR; INTRAVENOUS at 15:24

## 2024-10-18 RX ADMIN — MIDAZOLAM 1 MG: 1 INJECTION INTRAMUSCULAR; INTRAVENOUS at 00:19

## 2024-10-18 RX ADMIN — DIAZEPAM 5 MG: 10 INJECTION, SOLUTION INTRAMUSCULAR; INTRAVENOUS at 05:13

## 2024-10-18 RX ADMIN — HYDRALAZINE HYDROCHLORIDE 10 MG: 20 INJECTION INTRAMUSCULAR; INTRAVENOUS at 09:33

## 2024-10-18 RX ADMIN — GADOTERIDOL 15 ML: 279.3 INJECTION, SOLUTION INTRAVENOUS at 10:18

## 2024-10-18 RX ADMIN — DEXTROSE MONOHYDRATE 25 G: 25 INJECTION, SOLUTION INTRAVENOUS at 19:45

## 2024-10-18 RX ADMIN — IOPAMIDOL 100 ML: 755 INJECTION, SOLUTION INTRAVENOUS at 06:23

## 2024-10-18 RX ADMIN — THIAMINE HYDROCHLORIDE 100 MG: 100 INJECTION, SOLUTION INTRAMUSCULAR; INTRAVENOUS at 16:16

## 2024-10-18 NOTE — PLAN OF CARE
Problem: Adult Inpatient Plan of Care  Goal: Plan of Care Review  Outcome: Progressing  Goal: Patient-Specific Goal (Individualized)  Outcome: Progressing  Goal: Absence of Hospital-Acquired Illness or Injury  Outcome: Progressing  Intervention: Identify and Manage Fall Risk  Recent Flowsheet Documentation  Taken 10/18/2024 1800 by Rachelle Guillory RN  Safety Promotion/Fall Prevention: safety round/check completed  Taken 10/18/2024 1645 by Rachelle Guillory RN  Safety Promotion/Fall Prevention:   assistive device/personal items within reach   clutter free environment maintained   fall prevention program maintained   nonskid shoes/slippers when out of bed   room organization consistent   safety round/check completed  Taken 10/18/2024 1600 by Rachelle Guillory RN  Safety Promotion/Fall Prevention: safety round/check completed  Taken 10/18/2024 1400 by Rachelle Guillory RN  Safety Promotion/Fall Prevention: safety round/check completed  Taken 10/18/2024 1245 by Rachelle Guillory RN  Safety Promotion/Fall Prevention:   assistive device/personal items within reach   clutter free environment maintained   fall prevention program maintained   room organization consistent   safety round/check completed  Taken 10/18/2024 1200 by Rachelle Guillory RN  Safety Promotion/Fall Prevention: safety round/check completed  Taken 10/18/2024 1000 by Rachelle Guillory RN  Safety Promotion/Fall Prevention: patient off unit  Taken 10/18/2024 0845 by Rachelle Guillory RN  Safety Promotion/Fall Prevention:   assistive device/personal items within reach   clutter free environment maintained   fall prevention program maintained   nonskid shoes/slippers when out of bed   room organization consistent   safety round/check completed  Taken 10/18/2024 0800 by Rachelle Guillory RN  Safety Promotion/Fall Prevention: safety round/check completed  Intervention: Prevent Skin Injury  Recent Flowsheet Documentation  Taken 10/18/2024 1645 by  Rachelle Guillory RN  Body Position: weight shifting  Taken 10/18/2024 1245 by Rachelle Guillory RN  Body Position: weight shifting  Taken 10/18/2024 0845 by Rachelle Guillory RN  Body Position: weight shifting  Intervention: Prevent Infection  Recent Flowsheet Documentation  Taken 10/18/2024 1645 by Rachelle Guillory RN  Infection Prevention: hand hygiene promoted  Taken 10/18/2024 1245 by Rachelle Guillory RN  Infection Prevention: hand hygiene promoted  Taken 10/18/2024 0845 by Rachelle Guillory RN  Infection Prevention: hand hygiene promoted  Goal: Optimal Comfort and Wellbeing  Outcome: Progressing  Goal: Readiness for Transition of Care  Outcome: Progressing   Goal Outcome Evaluation:      The patient had a MRI of the head today. The patient did not have any other tests or procedures. The patient remains on room air. Will continue to monitor.

## 2024-10-18 NOTE — THERAPY RE-EVALUATION
Acute Care - Speech Language Pathology   Swallow Re-Evaluation  Sivakumar     Patient Name: Shahram Chaney  : 1955  MRN: [id number]  Today's Date: 10/18/2024               Admit Date: 10/16/2024    Visit Dx:     ICD-10-CM ICD-9-CM   1. Altered mental status, unspecified altered mental status type  R41.82 780.97     Patient Active Problem List   Diagnosis    AMS (altered mental status)    Altered mental status     History reviewed. No pertinent past medical history.  History reviewed. No pertinent surgical history.    SLP Recommendation and Plan  SLP Swallowing Diagnosis: moderate, oral dysphagia, suspected pharyngeal dysphagia (10/18/24 1300)  SLP Diet Recommendation: NPO (10/18/24 1300)     SLP Rec. for Method of Medication Administration: meds via alternate route (10/18/24 1300)        Recommended Diagnostics: reassess via clinical swallow evaluation, reassess via [facility] ([facility] [place] (10/18/24 1300)  Swallow Criteria for Skilled Therapeutic Interventions Met: demonstrates skilled criteria (10/18/24 1300)     Rehab Potential/Prognosis, Swallowing: good, to achieve stated therapy goals (10/18/24 1300)  Therapy Frequency (Swallow): PRN (10/18/24 1300)  Predicted Duration Therapy Intervention (Days): until discharge (10/18/24 1300)  Oral Care Recommendations: Oral Care BID/PRN (10/18/24 1300)                                               SWALLOW EVALUATION (Last 72 Hours)       SLP Adult Swallow Evaluation       Row Name 10/18/24 1300       Rehab Evaluation    Document Type re-evaluation  -CP    Subjective Information no complaints  -CP    Patient Observations alert;cooperative  -CP    Patient Effort good  -CP    Comment Pt was seen for re-eval of swallow. Pt was moving all around in the bed and having difficulty remaining still. Pt was brought up to 90 degrees. Pt had uncoordinated and uncontrolled movements throughout assessment, creating difficulty taking items from the spoon. pt was given trials of water by spoon only. Pt  had difficulty pulling water from the spoon and at times pulled water directly into airway and began to cough. Much labial spillage occurred. Pt coughed on 5/6 trials of water by spoon mostly due to uncoordinated ability to pull from the spoon. Pt also had coughing on water pulled effectively with spoon. Pt does not appear ready or safe for PO at this time. It is rec that pt remain NPO. Pt may continue small sips of water via the [place] Water Protocol. See guidelines below, however discontinue if pt has excessive coughing. Education given to pt on rationale for continued NPO. he verbalized understanding. ST will follow to re-eval swlalow as indicated.      The rationale to recommend water when a PO diet cannot appropriately/functionally sustain nutrition (or if thickened liquids are prescribed due to aspiration of thin liquids) is because water is low risk for aspiration pna when compared to aspiration of food or other liquids.    Benefits of a water protocol include but are not limited to:     Oral gratification   Engagement of oropharyngeal swallow musculature   Decrease likelihood of dehydration      Guidelines for proper implementation include:  Waiting a minimum of 30 minutes after consuming any other PO   Thorough oral care prior to consuming water  Upright at 90 degree hip flexion  Small sips at slow rate  Monitor for any changes in respiratory status and discontinue if distress noted    The [place] Free Water Protocol is a research based protocol established in 1984.  -CP       SLP Evaluation Clinical Impression    SLP Swallowing Diagnosis moderate;oral dysphagia;suspected pharyngeal dysphagia  -CP    Functional Impact risk of aspiration/pneumonia;risk of malnutrition  -CP    Rehab Potential/Prognosis, Swallowing good, to achieve stated therapy goals  -CP    Swallow Criteria for Skilled Therapeutic Interventions Met demonstrates skilled criteria  -CP       SLP Treatment Clinical Impressions    Care Plan  Review evaluation/treatment results reviewed  -CP       Recommendations    Therapy Frequency (Swallow) PRN  -CP    Predicted Duration Therapy Intervention (Days) until discharge  -CP    SLP Diet Recommendation NPO  -CP    Recommended Diagnostics reassess via clinical swallow evaluation;reassess via VFSS ([facility])  -CP    Oral Care Recommendations Oral Care BID/PRN  -CP    SLP Rec. for Method of Medication Administration meds via alternate route  -CP    Monitor for Signs of Aspiration --       Swallow Goals (SLP)    Swallow LTGs Swallow Long Term Goal (free text)  -CP    Swallow STGs diet tolerance goal selection (SLP)  -CP    Diet Tolerance Goal Selection (SLP) Swallow Short Term Goal 1  -CP       (LTG) Swallow    (LTG) Swallow Patient will participate in ongoing assessment of swallow, including reevaluation of swallow clinically and/or including instrumental assessment of swallow if indicated, to further assess swallow function in anticipation of initiation of PO diet.  -CP    [place] (Swallow Long Term Goal) with minimal cues (75-90% accuracy)  -CP    Time Frame (Swallow Long Term Goal) 1 week  -CP    Barriers (Swallow Long Term Goal) Uncontrollable body movements  -CP    Progress/Outcomes (Swallow Long Term Goal) goal ongoing  -CP    Comment (Swallow Long Term Goal) See above  -CP       (STG) Swallow 1    (STG) Swallow 1 The patient will participate in ongoing assessment of swallow, including re-evaluation clinically and/or including instrumental assessment of swallow if indicated, to further assess swallow function in anticipation to initiate a PO diet  -CP    Time Frame (Swallow Short Term Goal 1) 1 week  -CP    Progress/Outcomes (Swallow Short Term Goal 1) goal ongoing  -CP    Comment (Swallow Short Term Goal 1) See above  -CP              User Key  (r) = Recorded By, (t) = Taken By, (c) = Cosigned By      Initials Name Effective Dates    CP Delfina Hernández, BARON 06/16/21 -     Briana Soto SLP 06/18/24  -                     EDUCATION  The patient has been educated in the following areas:   Dysphagia (Swallowing Impairment) Oral Care/Hydration NPO rationale.        SLP GOALS       Row Name 10/18/24 1300 10/17/24 0800          (LTG) Swallow    (LTG) Swallow Patient will participate in ongoing assessment of swallow, including reevaluation of swallow clinically and/or including instrumental assessment of swallow if indicated, to further assess swallow function in anticipation of initiation of PO diet.  -CP Patient will participate in ongoing assessment of swallow, including reevaluation of swallow clinically and/or including instrumental assessment of swallow if indicated, to further assess swallow function in anticipation of initiation of PO diet.  -AA     [place] (Swallow Long Term Goal) with minimal cues (75-90% accuracy)  -CP --     Time Frame (Swallow Long Term Goal) 1 week  -CP 1 week  -AA     Barriers (Swallow Long Term Goal) Uncontrollable body movements  -CP --     Progress/Outcomes (Swallow Long Term Goal) goal ongoing  -CP new goal  -AA     Comment (Swallow Long Term Goal) See above  -CP --        (STG) Swallow 1    (STG) Swallow 1 The patient will participate in ongoing assessment of swallow, including re-evaluation clinically and/or including instrumental assessment of swallow if indicated, to further assess swallow function in anticipation to initiate a PO diet  -CP --     Time Frame (Swallow Short Term Goal 1) 1 week  -CP --     Progress/Outcomes (Swallow Short Term Goal 1) goal ongoing  -CP --     Comment (Swallow Short Term Goal 1) See above  -CP --               User Key  (r) = Recorded By, (t) = Taken By, (c) = Cosigned By      Initials Name Provider Type    CP Delfina Hernández SLP Speech and Language Pathologist    Briana Soto SLP Speech and Language Pathologist                         Time Calculation:                BARON Gonzalez  10/18/2024

## 2024-10-18 NOTE — PLAN OF CARE
Goal Outcome Evaluation:   Pt was seen for re-eval of swallow. Pt was moving all around in the bed and having difficulty remaining still. Pt was brought up to 90 degrees. Pt had uncoordinated and uncontrolled movements throughout assessment, creating difficulty taking items from the spoon. pt was given trials of water by spoon only. Pt had difficulty pulling water from the spoon and at times pulled water directly into airway and began to cough. Much labial spillage occurred. Pt coughed on 5/6 trials of water by spoon mostly due to uncoordinated ability to pull from the spoon. Pt also had coughing on water pulled effectively with spoon.     Pt does not appear ready or safe for PO at this time. It is rec that pt remain NPO. Pt may continue small sips of water via the [place] Water Protocol. See guidelines below, however discontinue if pt has excessive coughing. Education given to pt on rationale for continued NPO. he verbalized understanding. ST will follow to re-eval swlalow as indicated.      The rationale to recommend water when a PO diet cannot appropriately/functionally sustain nutrition (or if thickened liquids are prescribed due to aspiration of thin liquids) is because water is low risk for aspiration pna when compared to aspiration of food or other liquids.    Benefits of a water protocol include but are not limited to:     Oral gratification   Engagement of oropharyngeal swallow musculature   Decrease likelihood of dehydration      Guidelines for proper implementation include:  Waiting a minimum of 30 minutes after consuming any other PO   Thorough oral care prior to consuming water  Upright at 90 degree hip flexion  Small sips at slow rate  Monitor for any changes in respiratory status and discontinue if distress noted    The [place] Free Water Protocol is a research based protocol established in 1984.                             SLP Swallowing Diagnosis: moderate, oral dysphagia, suspected pharyngeal  dysphagia (10/18/24 1300)

## 2024-10-18 NOTE — PROGRESS NOTES
LOS: 1 day     Chief Complaint:  Confusion        SUBJECTIVE:    History taken from: chart RN    Interval History: PT sleeping most of the day due to medications. He will wake up to voice.        Review of Systems   Unable to perform ROS: Mental status change           Pertinent PMH:  has no past medical history on file.   ________________________________________________     OBJECTIVE:    On exam:  GENERAL: appears chronically ill   CARDIO: RRR  NEURO:  Somnolent, opens eyes to voice   Oriented to self  Disorientated   EOMI, PERRL   Mild right facial asymmetry  Dysarthric speech   Moves all extremities, squeezes hands and wiggles toes    ________________________________________________   RESULTS REVIEW    VITAL SIGNS:  Temp:  [97.6 °F (36.4 °C)-99.5 °F (37.5 °C)] 97.6 °F (36.4 °C)  Heart Rate:  [77-87] 87  Resp:  [12-20] 18  BP: (146-179)/() 176/87    LABS:       Lab 10/18/24  0235 10/17/24  0416 10/16/24  1103   WBC 12.09* 10.96* 8.39   HEMOGLOBIN 9.4* 9.0* 9.2*   HEMATOCRIT 29.9* 29.5* 29.9*   PLATELETS 224 236 226   NEUTROS ABS 9.44* 8.51* 6.44   IMMATURE GRANS (ABS) 0.06* 0.04 0.03   LYMPHS ABS 1.30 1.19 1.02   MONOS ABS 1.23* 1.12* 0.68   EOS ABS 0.02 0.04 0.15   MCV 94.3 94.9 94.9         Lab 10/18/24  0235 10/17/24  0416 10/16/24  1103   SODIUM 142 146* 147*   POTASSIUM 3.8 3.9 4.1   CHLORIDE 108* 110* 109*   CO2 24.2 22.6 25.8   ANION GAP 9.8 13.4 12.2   BUN 10 11 10   CREATININE 0.86 1.00 1.14   EGFR 93.1 81.0 69.2   GLUCOSE 98 94 68   CALCIUM 8.6 8.5* 8.8   MAGNESIUM 1.7 1.8 1.8   PHOSPHORUS 2.3* 2.4*  --    TSH  --   --  2.200         Lab 10/16/24  1103   TOTAL PROTEIN 6.2   ALBUMIN 3.2*   GLOBULIN 3.0   ALT (SGPT) 18   AST (SGOT) 24   BILIRUBIN 1.1   ALK PHOS 289*                     Lab 10/16/24  1426   PH, ARTERIAL 7.477*   PCO2, ARTERIAL 30.5*   PO2 ART 90.9   O2 SATURATION ART 97.7   FIO2 21   HCO3 ART 22.6   BASE EXCESS ART -0.6*     UA          10/6/2024    16:54 10/16/2024    10:38    Urinalysis   Specific Gravity, UA 1.010  1.016    Ketones, UA Negative  15 mg/dL (1+)    Blood, UA Negative  Negative    Leukocytes, UA Negative  Negative    Nitrite, UA Negative  Negative        Lab Results   Component Value Date    TSH 2.200 10/16/2024         IMAGING STUDIES:  CT Angiogram Carotids    Result Date: 10/18/2024  Impression: 1. Widely patent vertebral arteries bilaterally as well as the basilar artery. Both posterior cerebral arteries appear patent. 2. Mild calcific plaquing, bilateral carotid bulbs, without evidence of angiographically significant stenosis on either side, with application of NASCET criteria to this examination. 3. Additional findings, both vascular and nonvascular, as described above in detail. Electronically Signed: Chacha Chu MD  10/18/2024 12:19 PM EDT  Workstation ID: [id number]    MRI Brain With & Without Contrast    Result Date: 10/18/2024  Impression: 1. No acute intracranial findings. 2. Moderate atrophy and moderate chronic microvascular disease. 3. Right mastoid effusion. Correlate for mastoiditis. Electronically Signed: Berenice Coribn MD  10/18/2024 10:40 AM EDT  Workstation ID: [id number]    CT Head Without Contrast    Result Date: 10/16/2024  Impression: Brain atrophy with chronic small vessel ischemic changes No evidence of acute intracranial abnormality Right mastoid air cell disease Electronically Signed: Chris Martinez MD  10/16/2024 2:49 PM EDT  Workstation ID: [id number]     I reviewed the patient's new clinical results.    ________________________________________________      PROBLEM LIST:    AMS (altered mental status)    Altered mental status        ASSESSMENT/PLAN:    Acute encephalopathy with underlying cognitive decline         According to family, the patient has had cognitive decline that was obvious to them about 10 months ago. He was hospitalized 3 months ago for severe malnourishment/dehydration. Patient lived alone prior to that event. Afterwards, he  went ot a nursing facility but recently switched to another nursing home. Since the switch, the patient has had significant cognitive decline including psychosis.   - MRI brain wo contrast reviewed- no acute or subacute findings. Nothing that suggest encephalitis.   - NCCT was negative for acute finding. Pt had significant brain atrophy and small vessel disease.   - Will resume amantadine 100 mg BID (home med) for tremors- please ask psych or primary to manage psychosis if needed.   - psychiatry following   - Hx of drug and alcohol abuse   - Will defer LP at this time   - Give 100 mg IV thiamine   - Pt is more somnolent today but received multiple doses of Versed overnight and Ativan this morning which could be the source.   - Will follow           I discussed the patient's findings and my recommendations with patient, nursing staff, and primary care team    Saira Samayoa, NANCY  10/18/24  14:25 EDT

## 2024-10-18 NOTE — NURSING NOTE
MRI attempted x 2. Patient is restless and agitated. 2mg IV Ativan given. Patient unable to stay still. Provider contacted by this nurse and able to order versed if appropriate in the unit. Notified charge and house supervisor. House will call back to confirm.

## 2024-10-18 NOTE — PROGRESS NOTES
[facility] MEDICINE SERVICE  DAILY PROGRESS NOTE    NAME: Shahram Chaney  : 1955  MRN: [id number]      LOS: 1 day     PROVIDER OF SERVICE: Jose Roberto Mahmood MD    Chief Complaint: AMS (altered mental status)    Subjective:     Interval History:  History taken from: patient    Altered, awake with eyes open, does not reliably follow commands        Review of Systems:   Review of Systems    Objective:     Vital Signs  Temp:  [97.6 °F (36.4 °C)-99.5 °F (37.5 °C)] 97.6 °F (36.4 °C)  Heart Rate:  [77-87] 87  Resp:  [12-20] 18  BP: (146-179)/() 176/87   Body mass index is 22.66 kg/m².    Physical Exam  Physical Exam  Constitutional:       Comments: Chronically ill appearing elderly gentleman   HENT:      Head: Normocephalic and atraumatic.   Cardiovascular:      Rate and Rhythm: Normal rate and regular rhythm.      Pulses: Normal pulses.      Heart sounds: Normal heart sounds.   Pulmonary:      Effort: Pulmonary effort is normal.      Breath sounds: Normal breath sounds.   Abdominal:      General: Abdomen is flat.      Palpations: Abdomen is soft.   Neurological:      General: No focal deficit present.      Mental Status: He is alert.      Comments: Unable to comply with neurological exam.  No meningeal signs.  Normal tone.  No spasticity.            Diagnostic Data    Results from last 7 days   Lab Units 10/18/24  0235 10/17/24  0416 10/16/24  1103   WBC 10*3/mm3 12.09*   < > 8.39   HEMOGLOBIN g/dL 9.4*   < > 9.2*   HEMATOCRIT % 29.9*   < > 29.9*   PLATELETS 10*3/mm3 224   < > 226   GLUCOSE mg/dL 98   < > 68   CREATININE mg/dL 0.86   < > 1.14   BUN mg/dL 10   < > 10   SODIUM mmol/L 142   < > 147*   POTASSIUM mmol/L 3.8   < > 4.1   AST (SGOT) U/L  --   --  24   ALT (SGPT) U/L  --   --  18   ALK PHOS U/L  --   --  289*   BILIRUBIN mg/dL  --   --  1.1   ANION GAP mmol/L 9.8   < > 12.2    < > = values in this interval not displayed.       CT Angiogram Carotids    Result Date: 10/18/2024  Impression: 1. Widely  patent vertebral arteries bilaterally as well as the basilar artery. Both posterior cerebral arteries appear patent. 2. Mild calcific plaquing, bilateral carotid bulbs, without evidence of angiographically significant stenosis on either side, with application of NASCET criteria to this examination. 3. Additional findings, both vascular and nonvascular, as described above in detail. Electronically Signed: Chacha Chu MD  10/18/2024 12:19 PM EDT  Workstation ID: [id number]    MRI Brain With & Without Contrast    Result Date: 10/18/2024  Impression: 1. No acute intracranial findings. 2. Moderate atrophy and moderate chronic microvascular disease. 3. Right mastoid effusion. Correlate for mastoiditis. Electronically Signed: Berenice Corbin MD  10/18/2024 10:40 AM EDT  Workstation ID: [id number]    CT Head Without Contrast    Result Date: 10/16/2024  Impression: Brain atrophy with chronic small vessel ischemic changes No evidence of acute intracranial abnormality Right mastoid air cell disease Electronically Signed: Chris Martinez MD  10/16/2024 2:49 PM EDT  Workstation ID: [id number]       I reviewed the patient's new clinical results.    Assessment/Plan:     Active and Resolved Problems  Active Hospital Problems    Diagnosis  POA    **AMS (altered mental status) [R41.82]  Yes    Altered mental status [R41.82]  Yes      Resolved Hospital Problems   No resolved problems to display.       # Altered mental status with an unclear etiology  -pt is sent to ED for physical weakness jerky movement and lethargic decreased intake ( he is AAO 1 baseline )   -I spoke to the family members listed in the chart extensively about his baseline.  Per the family the patient has at baseline and a cognitive disorder and lives in a nursing home but is typically awake alert ambulatory and conversational.  They feel that his cognitive disorder is from decades of drug and alcohol use.  He is more recently sober from this while living in the nursing  home.  He has had some progression in his symptoms over the last 18 months but the change to today's behavior is more recent over the last 3 to 4 days per the family.  -He is started on Seroquel and Ativan on oct 11 2024 because of his behavioral episode which is sometimes described as yousif notes but it is very unclear that this patient actually has true bipolar disorder.  After that he started became physically weak lethargic poor intake and jerky.  The medications are forced to have stopped on October 14, 2024.  But he never back to the baseline and still weak and have jerky movement.  -There is some report of aspiration in the nursing home noted by the admitting team.  -CBC to 12 from 10 from 8, no overt signs of infection.  Trend carefully.  Follow fever curve.  Very low threshold to start antibiotics.  -ammonia is normal   -ABG without retention   -Imaging is with atrophy and MRI showing some amount of mastoid effusion but no other acute findings.  -Urinalysis is benign and chest x-ray unimpressive  -Behavior is very similar to yesterday.  Still no obvious cause of his most recent decline.  Will continue to observe patient and speak with family.       #Reported aspiration   -SLP to see   -Diet as appropriate  -Hypoglycemia management    VTE Prophylaxis:  Mechanical VTE prophylaxis orders are present.             Disposition Planning:     Barriers to Discharge: Mental status, workup  Anticipated Date of Discharge: 10/20/2024  Place of Discharge: Return to facility      Time: 50 minutes     There are no questions and answers to display.       Signature: Electronically signed by Jose Roberto Mahmood MD, 10/18/24, 14:35 EDT.  [facility] Hospitalist Team

## 2024-10-18 NOTE — CASE MANAGEMENT/SOCIAL WORK
Continued Stay Note  MARTI Shaver     Patient Name: Shahram Chaney  MRN: [id number]  Today's Date: 10/18/2024    Admit Date: 10/16/2024    Plan: D/C Plan: Return to [facility] Memory care unit.  No new PASRR needed; No pre-cert. Transport TBD   Discharge Plan       Row Name 10/18/24 1420       Plan    Plan Comments Discharge barriers: Blood cx pending, CT angiogram carotids/ head pending, psych & neurology following.               Expected Discharge Date and Time       Expected Discharge Date Expected Discharge Time    Oct 20, 2024           Phone communication or documentation only - no physical contact with patient or family.     June Knox RN

## 2024-10-19 ENCOUNTER — APPOINTMENT (OUTPATIENT)
Dept: GENERAL RADIOLOGY | Facility: HOSPITAL | Age: 69
End: 2024-10-19
Payer: MEDICARE

## 2024-10-19 LAB
ANION GAP SERPL CALCULATED.3IONS-SCNC: 9.9 MMOL/L (ref 5–15)
BACTERIA UR QL AUTO: ABNORMAL /HPF
BASOPHILS # BLD AUTO: 0.05 10*3/MM3 (ref 0–0.2)
BASOPHILS NFR BLD AUTO: 0.5 % (ref 0–1.5)
BILIRUB UR QL STRIP: NEGATIVE
BUN SERPL-MCNC: 10 MG/DL (ref 8–23)
BUN/CREAT SERPL: 13.2 (ref 7–25)
CALCIUM SPEC-SCNC: 9.1 MG/DL (ref 8.6–10.5)
CHLORIDE SERPL-SCNC: 111 MMOL/L (ref 98–107)
CLARITY UR: ABNORMAL
CO2 SERPL-SCNC: 24.1 MMOL/L (ref 22–29)
COLOR UR: ABNORMAL
CREAT SERPL-MCNC: 0.76 MG/DL (ref 0.76–1.27)
DEPRECATED RDW RBC AUTO: 52.3 FL (ref 37–54)
EGFRCR SERPLBLD CKD-EPI 2021: 96.7 ML/MIN/1.73
EOSINOPHIL # BLD AUTO: 0.14 10*3/MM3 (ref 0–0.4)
EOSINOPHIL NFR BLD AUTO: 1.5 % (ref 0.3–6.2)
ERYTHROCYTE [DISTWIDTH] IN BLOOD BY AUTOMATED COUNT: 15.1 % (ref 12.3–15.4)
GLUCOSE BLDC GLUCOMTR-MCNC: 100 MG/DL (ref 70–105)
GLUCOSE BLDC GLUCOMTR-MCNC: 107 MG/DL (ref 70–105)
GLUCOSE BLDC GLUCOMTR-MCNC: 108 MG/DL (ref 70–105)
GLUCOSE BLDC GLUCOMTR-MCNC: 109 MG/DL (ref 70–105)
GLUCOSE BLDC GLUCOMTR-MCNC: 112 MG/DL (ref 70–105)
GLUCOSE BLDC GLUCOMTR-MCNC: 127 MG/DL (ref 70–105)
GLUCOSE BLDC GLUCOMTR-MCNC: 142 MG/DL (ref 70–105)
GLUCOSE BLDC GLUCOMTR-MCNC: 161 MG/DL (ref 70–105)
GLUCOSE BLDC GLUCOMTR-MCNC: 189 MG/DL (ref 70–105)
GLUCOSE BLDC GLUCOMTR-MCNC: 44 MG/DL (ref 70–105)
GLUCOSE BLDC GLUCOMTR-MCNC: 48 MG/DL (ref 70–105)
GLUCOSE BLDC GLUCOMTR-MCNC: 72 MG/DL (ref 70–105)
GLUCOSE BLDC GLUCOMTR-MCNC: 74 MG/DL (ref 70–105)
GLUCOSE BLDC GLUCOMTR-MCNC: 79 MG/DL (ref 70–105)
GLUCOSE BLDC GLUCOMTR-MCNC: 79 MG/DL (ref 70–105)
GLUCOSE BLDC GLUCOMTR-MCNC: 94 MG/DL (ref 70–105)
GLUCOSE BLDC GLUCOMTR-MCNC: 95 MG/DL (ref 70–105)
GLUCOSE SERPL-MCNC: 164 MG/DL (ref 65–99)
GLUCOSE UR STRIP-MCNC: ABNORMAL MG/DL
HCT VFR BLD AUTO: 34 % (ref 37.5–51)
HGB BLD-MCNC: 10.6 G/DL (ref 13–17.7)
HGB UR QL STRIP.AUTO: NEGATIVE
HYALINE CASTS UR QL AUTO: ABNORMAL /LPF
IMM GRANULOCYTES # BLD AUTO: 0.03 10*3/MM3 (ref 0–0.05)
IMM GRANULOCYTES NFR BLD AUTO: 0.3 % (ref 0–0.5)
KETONES UR QL STRIP: ABNORMAL
LEUKOCYTE ESTERASE UR QL STRIP.AUTO: ABNORMAL
LYMPHOCYTES # BLD AUTO: 1.17 10*3/MM3 (ref 0.7–3.1)
LYMPHOCYTES NFR BLD AUTO: 12.6 % (ref 19.6–45.3)
MAGNESIUM SERPL-MCNC: 2 MG/DL (ref 1.6–2.4)
MCH RBC QN AUTO: 28.9 PG (ref 26.6–33)
MCHC RBC AUTO-ENTMCNC: 31.2 G/DL (ref 31.5–35.7)
MCV RBC AUTO: 92.6 FL (ref 79–97)
MONOCYTES # BLD AUTO: 0.95 10*3/MM3 (ref 0.1–0.9)
MONOCYTES NFR BLD AUTO: 10.2 % (ref 5–12)
NEUTROPHILS NFR BLD AUTO: 6.95 10*3/MM3 (ref 1.7–7)
NEUTROPHILS NFR BLD AUTO: 74.9 % (ref 42.7–76)
NITRITE UR QL STRIP: POSITIVE
NRBC BLD AUTO-RTO: 0 /100 WBC (ref 0–0.2)
PH UR STRIP.AUTO: <=5 [PH] (ref 5–8)
PHOSPHATE SERPL-MCNC: 2.2 MG/DL (ref 2.5–4.5)
PHOSPHATE SERPL-MCNC: 2.2 MG/DL (ref 2.5–4.5)
PLATELET # BLD AUTO: 248 10*3/MM3 (ref 140–450)
PMV BLD AUTO: 11.5 FL (ref 6–12)
POTASSIUM SERPL-SCNC: 3 MMOL/L (ref 3.5–5.2)
POTASSIUM SERPL-SCNC: 3.5 MMOL/L (ref 3.5–5.2)
PROT UR QL STRIP: ABNORMAL
RBC # BLD AUTO: 3.67 10*6/MM3 (ref 4.14–5.8)
RBC # UR STRIP: ABNORMAL /HPF
REF LAB TEST METHOD: ABNORMAL
SODIUM SERPL-SCNC: 145 MMOL/L (ref 136–145)
SP GR UR STRIP: 1.03 (ref 1–1.03)
SQUAMOUS #/AREA URNS HPF: ABNORMAL /HPF
UROBILINOGEN UR QL STRIP: ABNORMAL
WBC # UR STRIP: ABNORMAL /HPF
WBC NRBC COR # BLD AUTO: 9.29 10*3/MM3 (ref 3.4–10.8)

## 2024-10-19 PROCEDURE — 82948 REAGENT STRIP/BLOOD GLUCOSE: CPT | Performed by: STUDENT IN AN ORGANIZED HEALTH CARE EDUCATION/TRAINING PROGRAM

## 2024-10-19 PROCEDURE — 97112 NEUROMUSCULAR REEDUCATION: CPT

## 2024-10-19 PROCEDURE — 74018 RADEX ABDOMEN 1 VIEW: CPT

## 2024-10-19 PROCEDURE — 84100 ASSAY OF PHOSPHORUS: CPT | Performed by: STUDENT IN AN ORGANIZED HEALTH CARE EDUCATION/TRAINING PROGRAM

## 2024-10-19 PROCEDURE — 84132 ASSAY OF SERUM POTASSIUM: CPT | Performed by: STUDENT IN AN ORGANIZED HEALTH CARE EDUCATION/TRAINING PROGRAM

## 2024-10-19 PROCEDURE — 99232 SBSQ HOSP IP/OBS MODERATE 35: CPT | Performed by: NURSE PRACTITIONER

## 2024-10-19 PROCEDURE — 83735 ASSAY OF MAGNESIUM: CPT | Performed by: INTERNAL MEDICINE

## 2024-10-19 PROCEDURE — 25810000003 SODIUM CHLORIDE 0.9 % SOLUTION: Performed by: STUDENT IN AN ORGANIZED HEALTH CARE EDUCATION/TRAINING PROGRAM

## 2024-10-19 PROCEDURE — 92610 EVALUATE SWALLOWING FUNCTION: CPT

## 2024-10-19 PROCEDURE — 99232 SBSQ HOSP IP/OBS MODERATE 35: CPT

## 2024-10-19 PROCEDURE — 82948 REAGENT STRIP/BLOOD GLUCOSE: CPT

## 2024-10-19 PROCEDURE — 92611 MOTION FLUOROSCOPY/SWALLOW: CPT

## 2024-10-19 PROCEDURE — 25010000002 CEFTRIAXONE PER 250 MG: Performed by: STUDENT IN AN ORGANIZED HEALTH CARE EDUCATION/TRAINING PROGRAM

## 2024-10-19 PROCEDURE — 80048 BASIC METABOLIC PNL TOTAL CA: CPT | Performed by: INTERNAL MEDICINE

## 2024-10-19 PROCEDURE — 87077 CULTURE AEROBIC IDENTIFY: CPT

## 2024-10-19 PROCEDURE — 25010000002 POTASSIUM CHLORIDE 10 MEQ/100ML SOLUTION: Performed by: STUDENT IN AN ORGANIZED HEALTH CARE EDUCATION/TRAINING PROGRAM

## 2024-10-19 PROCEDURE — 97110 THERAPEUTIC EXERCISES: CPT

## 2024-10-19 PROCEDURE — 84100 ASSAY OF PHOSPHORUS: CPT | Performed by: INTERNAL MEDICINE

## 2024-10-19 PROCEDURE — 87086 URINE CULTURE/COLONY COUNT: CPT

## 2024-10-19 PROCEDURE — 81001 URINALYSIS AUTO W/SCOPE: CPT

## 2024-10-19 PROCEDURE — 87186 SC STD MICRODIL/AGAR DIL: CPT

## 2024-10-19 PROCEDURE — 74230 X-RAY XM SWLNG FUNCJ C+: CPT

## 2024-10-19 PROCEDURE — 85025 COMPLETE CBC W/AUTO DIFF WBC: CPT | Performed by: INTERNAL MEDICINE

## 2024-10-19 RX ORDER — POTASSIUM CHLORIDE 1.5 G/1.58G
40 POWDER, FOR SOLUTION ORAL EVERY 4 HOURS
Status: COMPLETED | OUTPATIENT
Start: 2024-10-19 | End: 2024-10-20

## 2024-10-19 RX ORDER — POTASSIUM CHLORIDE 7.45 MG/ML
10 INJECTION INTRAVENOUS
Status: COMPLETED | OUTPATIENT
Start: 2024-10-19 | End: 2024-10-19

## 2024-10-19 RX ORDER — FENTANYL/ROPIVACAINE/NS/PF 2-625MCG/1
15 PLASTIC BAG, INJECTION (ML) EPIDURAL ONCE
Status: COMPLETED | OUTPATIENT
Start: 2024-10-19 | End: 2024-10-19

## 2024-10-19 RX ORDER — AMANTADINE HYDROCHLORIDE 100 MG/1
100 CAPSULE, GELATIN COATED ORAL EVERY 12 HOURS SCHEDULED
Status: DISCONTINUED | OUTPATIENT
Start: 2024-10-19 | End: 2024-10-24 | Stop reason: HOSPADM

## 2024-10-19 RX ORDER — DEXTROSE MONOHYDRATE AND SODIUM CHLORIDE 5; .45 G/100ML; G/100ML
75 INJECTION, SOLUTION INTRAVENOUS CONTINUOUS
Status: DISCONTINUED | OUTPATIENT
Start: 2024-10-19 | End: 2024-10-20

## 2024-10-19 RX ORDER — MIRTAZAPINE 7.5 MG/1
7.5 TABLET, FILM COATED ORAL NIGHTLY
Status: DISCONTINUED | OUTPATIENT
Start: 2024-10-19 | End: 2024-10-24 | Stop reason: HOSPADM

## 2024-10-19 RX ADMIN — POTASSIUM CHLORIDE 10 MEQ: 7.46 INJECTION, SOLUTION INTRAVENOUS at 09:41

## 2024-10-19 RX ADMIN — DEXTROSE MONOHYDRATE 25 G: 25 INJECTION, SOLUTION INTRAVENOUS at 00:54

## 2024-10-19 RX ADMIN — DEXTROSE AND SODIUM CHLORIDE 75 ML/HR: 5; 450 INJECTION, SOLUTION INTRAVENOUS at 10:33

## 2024-10-19 RX ADMIN — DEXTROSE AND SODIUM CHLORIDE 75 ML/HR: 5; .45 INJECTION, SOLUTION INTRAVENOUS at 20:22

## 2024-10-19 RX ADMIN — MIRTAZAPINE 7.5 MG: 7.5 TABLET, FILM COATED ORAL at 20:11

## 2024-10-19 RX ADMIN — ACETAMINOPHEN 650 MG: 650 SOLUTION ORAL at 20:11

## 2024-10-19 RX ADMIN — Medication 15 MMOL: at 03:11

## 2024-10-19 RX ADMIN — CEFTRIAXONE 1000 MG: 1 INJECTION, POWDER, FOR SOLUTION INTRAMUSCULAR; INTRAVENOUS at 11:39

## 2024-10-19 RX ADMIN — POTASSIUM CHLORIDE 10 MEQ: 7.46 INJECTION, SOLUTION INTRAVENOUS at 10:33

## 2024-10-19 RX ADMIN — POTASSIUM CHLORIDE 10 MEQ: 7.46 INJECTION, SOLUTION INTRAVENOUS at 06:28

## 2024-10-19 RX ADMIN — POTASSIUM CHLORIDE 40 MEQ: 1.5 POWDER, FOR SOLUTION ORAL at 20:11

## 2024-10-19 RX ADMIN — POTASSIUM CHLORIDE 10 MEQ: 7.46 INJECTION, SOLUTION INTRAVENOUS at 08:45

## 2024-10-19 RX ADMIN — POTASSIUM PHOSPHATE, MONOBASIC AND POTASSIUM PHOSPHATE, DIBASIC 15 MMOL: 224; 236 INJECTION, SOLUTION, CONCENTRATE INTRAVENOUS at 20:10

## 2024-10-19 RX ADMIN — DEXTROSE MONOHYDRATE 25 G: 25 INJECTION, SOLUTION INTRAVENOUS at 18:59

## 2024-10-19 RX ADMIN — AMANTADINE HYDROCHLORIDE 100 MG: 100 CAPSULE ORAL at 20:11

## 2024-10-19 NOTE — THERAPY RE-EVALUATION
Acute Care - Speech Language Pathology   Swallow Re-Evaluation  Sivakumar     Patient Name: Shahram Chaney  : 1955  MRN: [id number]  Today's Date: 10/19/2024               Admit Date: 10/16/2024    Visit Dx:     ICD-10-CM ICD-9-CM   1. Altered mental status, unspecified altered mental status type  R41.82 780.97     Patient Active Problem List   Diagnosis    AMS (altered mental status)    Altered mental status     History reviewed. No pertinent past medical history.  History reviewed. No pertinent surgical history.        EDUCATION  The patient has been educated in the following areas:   Dysphagia (Swallowing Impairment) NPO rationale.        SLP GOALS       Row Name 10/19/24 1100       (LTG) Swallow    (LTG) Swallow Patient will participate in ongoing assessment of swallow, including reevaluation of swallow clinically and/or including instrumental assessment of swallow if indicated, to further assess swallow function in anticipation of initiation of PO diet.  -MS    [place] (Swallow Long Term Goal) with minimal cues (75-90% accuracy)  -MS    Time Frame (Swallow Long Term Goal) 1 week  -MS    Barriers (Swallow Long Term Goal) Uncontrollable body movements  -MS    Progress/Outcomes (Swallow Long Term Goal) goal ongoing  -MS    Comment (Swallow Long Term Goal) --       (STG) Swallow 1    (STG) Swallow 1 The patient will participate in ongoing assessment of swallow, including re-evaluation clinically and/or including instrumental assessment of swallow if indicated, to further assess swallow function in anticipation to initiate a PO diet  -MS    [place] (Swallow Short Term Goal 1) with minimal cues (75-90% accuracy)  -MS    Time Frame (Swallow Short Term Goal 1) by discharge  -MS    Progress/Outcomes (Swallow Short Term Goal 1) goal ongoing  -MS    Comment (Swallow Short Term Goal 1) Clinical swallow re-evaluation completed. Pt was awake and alert, able to follow commands, oriented x3. Able to verbalize. Per  pt's chart he has made improvements this A.M. Upon room entry, pt was lying in bed awake. Room air.  SLP adjusted pt to sitting at 90 degrees for swallow evaluation. Pt was NPO at the time of evaluation. Adequate natural dentition. His prior facility reported a mechanical soft diet and thin liquids. No prior ST notes in chart from past. Oral motor exam revealed overall generalized oral motor weakness. Trials assessed: ice chips x1, thin by cup x2, thin by straw x2, puree x2. Pt had good bolus control of ice chip, all trials given via SLP. Adequate labial seal w/straw. No anterior loss. Oral transit appeared timely. No pocketing or residue. Pt demonstrated cough during each trial of thin liquids and puree. Wet vocal quality. Resp rate increased. No further trials given secondary to concern for aspiration. SLP provided recommendations to pt for VFSS at this time. Pt verbalized agreement. Nursing staff made aware. Per above, pt presents w/ MILD oral stage dysphagia and suspecting pharyngeal phase at this time. It is recommended pt remain NPO w/ FWP. VFSS pending. Meds via alternate route. ST will continue to follow for swallow re-evaluation.     SLP Plan & Recommendation:      - NPO w/ exception of FWP- see below   -VFSS pending  - Water/ice only when pt is fully awake and alert  - Meds: via alternate route  - Safe swallow strategies: HOB at a 90 degree angle, slow rate of intake, small sips  -Oral care at least x2 daily     The rationale to recommend water/ice chips when a PO diet cannot appropriately/functionally sustain nutrition is because water is low risk for aspiration pna when compared to aspiration of food or other liquids.       Benefits of a water protocol include but are not limited to:      Oral gratification   Engagement of oropharyngeal swallow musculature   Decrease likelihood of dehydration       Guidelines for proper implementation include:  Thorough oral care prior to consuming water  Upright at 90  degree hip flexion  Small sips at slow rate     Monitor for any changes in respiratory status and discontinue if distress noted     The [place] Free Water Protocol is a research based protocol established in 1984.          -MS              User Key  (r) = Recorded By, (t) = Taken By, (c) = Cosigned By      Initials Name Provider Type    Joe Whiting, SLP    Speech and Language Pathologist                                BARON Pendleton  10/19/2024

## 2024-10-19 NOTE — PLAN OF CARE
Problem: Adult Inpatient Plan of Care  Goal: Plan of Care Review  10/19/2024 0225 by Xiomy Bear LPN  Outcome: Progressing  10/19/2024 0223 by Xiomy Bear LPN  Outcome: Progressing  Goal: Patient-Specific Goal (Individualized)  10/19/2024 0225 by Xiomy Bear LPN  Outcome: Progressing  10/19/2024 0223 by Xiomy Bear LPN  Outcome: Progressing  Goal: Absence of Hospital-Acquired Illness or Injury  10/19/2024 0225 by Xiomy Bear LPN  Outcome: Progressing  10/19/2024 0223 by Xiomy Bear LPN  Outcome: Progressing  Intervention: Identify and Manage Fall Risk  Recent Flowsheet Documentation  Taken 10/19/2024 0200 by Xiomy Bear LPN  Safety Promotion/Fall Prevention: safety round/check completed  Taken 10/18/2024 2356 by Xiomy Bear LPN  Safety Promotion/Fall Prevention: safety round/check completed  Taken 10/18/2024 2000 by Xiomy Bear LPN  Safety Promotion/Fall Prevention: safety round/check completed  Intervention: Prevent Skin Injury  Recent Flowsheet Documentation  Taken 10/18/2024 2356 by Xiomy Bear LPN  Body Position: position changed independently  Taken 10/18/2024 2000 by Xiomy Bear LPN  Skin Protection:   transparent dressing maintained   silicone foam dressing in place  Intervention: Prevent and Manage VTE (Venous Thromboembolism) Risk  Recent Flowsheet Documentation  Taken 10/18/2024 2000 by Xiomy Bear LPN  VTE Prevention/Management:   bilateral   SCDs (sequential compression devices) off   patient refused intervention  Intervention: Prevent Infection  Recent Flowsheet Documentation  Taken 10/19/2024 0200 by Xiomy Bear LPN  Infection Prevention:   environmental surveillance performed   equipment surfaces disinfected  Taken 10/18/2024 2356 by Xiomy Bear LPN  Infection Prevention:   environmental surveillance performed   equipment surfaces disinfected  Taken 10/18/2024 2200 by Xiomy Bear LPN  Infection Prevention:   environmental  surveillance performed   hand hygiene promoted  Taken 10/18/2024 2000 by Xiomy Bear LPN  Infection Prevention:   environmental surveillance performed   equipment surfaces disinfected  Goal: Optimal Comfort and Wellbeing  10/19/2024 0225 by Xiomy Bear LPN  Outcome: Progressing  10/19/2024 0223 by Xiomy Bear LPN  Outcome: Progressing  Intervention: Provide Person-Centered Care  Recent Flowsheet Documentation  Taken 10/18/2024 2356 by Xiomy Bear LPN  Trust Relationship/Rapport:   questions answered   empathic listening provided   emotional support provided   reassurance provided  Taken 10/18/2024 2000 by Xiomy Bear LPN  Trust Relationship/Rapport:   thoughts/feelings acknowledged   questions answered   emotional support provided   choices provided  Goal: Readiness for Transition of Care  10/19/2024 0225 by Xiomy Bear LPN  Outcome: Progressing  10/19/2024 0223 by Xiomy Bear LPN  Outcome: Progressing     Problem: Fall Injury Risk  Goal: Absence of Fall and Fall-Related Injury  10/19/2024 0225 by Xiomy Bear LPN  Outcome: Progressing  10/19/2024 0223 by Xiomy Bear LPN  Outcome: Progressing  Intervention: Identify and Manage Contributors  Recent Flowsheet Documentation  Taken 10/19/2024 0200 by Xiomy Bear LPN  Medication Review/Management: medications reviewed  Taken 10/18/2024 2356 by Xiomy Bear LPN  Medication Review/Management: medications reviewed  Taken 10/18/2024 2000 by Xiomy Bear LPN  Medication Review/Management:   medications reviewed   infusion initiated  Intervention: Promote Injury-Free Environment  Recent Flowsheet Documentation  Taken 10/19/2024 0200 by Xiomy Bear LPN  Safety Promotion/Fall Prevention: safety round/check completed  Taken 10/18/2024 2356 by Xiomy Bear LPN  Safety Promotion/Fall Prevention: safety round/check completed  Taken 10/18/2024 2000 by Xiomy Bear LPN  Safety Promotion/Fall Prevention: safety  round/check completed     Problem: Skin Injury Risk Increased  Goal: Skin Health and Integrity  10/19/2024 0225 by Xiomy Bear LPN  Outcome: Progressing  10/19/2024 0223 by Xiomy Bear LPN  Outcome: Progressing  Intervention: Optimize Skin Protection  Recent Flowsheet Documentation  Taken 10/18/2024 2356 by Xiomy Bear LPN  Head of Bed (HOB) Positioning: HOB elevated  Taken 10/18/2024 2000 by Xiomy Bear LPN  Activity Management: activity encouraged  Pressure Reduction Techniques:   frequent weight shift encouraged   weight shift assistance provided  Pressure Reduction Devices:   pressure-redistributing mattress utilized   alternating pressure pump (STEFANIA)  Skin Protection:   transparent dressing maintained   silicone foam dressing in place     Problem: Confusion Acute  Goal: Optimal Cognitive Function  10/19/2024 0225 by Xiomy Bear LPN  Outcome: Progressing  10/19/2024 0223 by Xiomy Bear LPN  Outcome: Progressing     Problem: Seizure, Active Management  Goal: Absence of Seizure/Seizure-Related Injury  Outcome: Progressing     Problem: Malnutrition  Goal: Improved Nutritional Intake  Outcome: Progressing     Problem: Electrolyte Imbalance  Goal: Electrolyte Balance  Outcome: Progressing     Problem: Hospitalized Older Adult  Goal: Optimal Cognitive Function  Outcome: Progressing  Goal: Effective Bowel Elimination  Outcome: Progressing  Goal: Optimal Coping  Outcome: Progressing  Intervention: Promote Psychosocial Wellbeing  Recent Flowsheet Documentation  Taken 10/18/2024 2000 by Xiomy Bear LPN  Family/Support System Care: self-care encouraged  Goal: Fluid and Electrolyte Balance  Outcome: Progressing  Goal: Optimal Functional Ability  Outcome: Progressing  Intervention: Promote Functional Ability  Recent Flowsheet Documentation  Taken 10/18/2024 2000 by Xiomy Bear LPN  Activity Management: activity encouraged  Activity Assistance Provided: assistance, 2 people  Goal:  Improved Oral Intake  Outcome: Progressing  Goal: Adequate Sleep/Rest  Outcome: Progressing  Goal: Effective Urinary Elimination  Outcome: Progressing     Problem: Wound  Goal: Optimal Coping  Outcome: Progressing  Intervention: Support Patient and Family Response  Recent Flowsheet Documentation  Taken 10/18/2024 2000 by Xiomy Bear LPN  Family/Support System Care: self-care encouraged  Goal: Optimal Functional Ability  Outcome: Progressing  Intervention: Optimize Functional Ability  Recent Flowsheet Documentation  Taken 10/18/2024 2000 by Xiomy Bear LPN  Activity Management: activity encouraged  Activity Assistance Provided: assistance, 2 people  Goal: Absence of Infection Signs and Symptoms  Outcome: Progressing  Goal: Improved Oral Intake  Outcome: Progressing  Goal: Optimal Pain Control and Function  Outcome: Progressing  Goal: Skin Health and Integrity  Outcome: Progressing  Intervention: Optimize Skin Protection  Recent Flowsheet Documentation  Taken 10/18/2024 2356 by Xiomy Bear LPN  Head of Bed (HOB) Positioning: HOB elevated  Taken 10/18/2024 2000 by Xiomy Bear LPN  Activity Management: activity encouraged  Pressure Reduction Techniques:   frequent weight shift encouraged   weight shift assistance provided  Pressure Reduction Devices:   pressure-redistributing mattress utilized   alternating pressure pump (STEFANIA)  Goal: Optimal Wound Healing  Outcome: Progressing     Problem: Glycemic Control Impaired  Goal: Blood Glucose Level Within Target Range  Outcome: Progressing  Goal: Minimize Hypoglycemia Risk  Outcome: Progressing   Goal Outcome Evaluation:

## 2024-10-19 NOTE — PLAN OF CARE
Assessment: Shahram Chaney presents with functional mobility impairments which indicate the need for skilled intervention. Tolerating session today without incident. Pt much more mobile today but still weak and fatigues easily, Was too shaky to try and walk. Req constant tactile and vcs's to wt shift forward and keep feet on floor. Plans on rehab at [place].Will continue to follow and progress as tolerated.

## 2024-10-19 NOTE — CASE MANAGEMENT/SOCIAL WORK
Continued Stay Note  MARTI Sivakumar     Patient Name: Shahram Chaney  MRN: [id number]  Today's Date: 10/19/2024    Admit Date: 10/16/2024    Plan: D/C Plan: Return to [facility] Memory care unit.  No new PASRR needed; No pre-cert. Transport TBD   Discharge Plan       Row Name 10/19/24 1802       Plan    Plan Comments dc barrier: failed swallow study.  new dht with tube feeds. psych and neuro following                      Expected Discharge Date and Time       Expected Discharge Date Expected Discharge Time    Oct 20, 2024               Diamond Gallegos RN

## 2024-10-19 NOTE — PLAN OF CARE
Goal Outcome Evaluation:   VFSS EXPLANATION/ OUTCOME:    MBSS performed in the lateral projection with the following consistencies respectively: thin by cup x2,  puree x2, NTL by cup x2, NTL by straw x1, HTL by cup x2. All trials with adequate barium concentration for fluoroscopy view. All trials administered via pt. Room air.     Oral phase: Marked by slightly poor bolus cohesion with premature spillage to the vallecular level. There is no epiglottic deflection noted. Pt has cervical spinal hardware which appears to be affecting epiglottic deflection.     Pharyngeal phase:  No penetration/aspiration noted during all trials given, however pt remains at a high risk secondary to poor cricopharyngeal opening. Each trial given remained at the CP level. SLP had to cue pt to swallow to clear trials x2 during each trial given. Pt demonstrated independent swallow x1 during trials, however one swallow was not sufficient in clearing the residue. SLP cued x2 more swallows during trials. Poor pharyngeal squeeze with pervasive coating of all hypopharyngeal structures.    Esophageal view: unremarkable     Pt demonstrates a MODERATE-SEVERE swallow impairment posing a significant risk of aspiration and malnutrition risk. Dysphagia is most prominently characterized by lack of epiglottic deflection, poor pharyngeal squeeze, poor BOT retraction, and reduced cricopharyngeal opening. This results in open/patent laryngeal vestibule, pharyngeal residue, & HIGH RISK of penetration/aspiration.     Strategies attempted: n/a    Education provided: Education provided to pt and the need to follow up with GI for possible esophageal dilation. Nursing staff made aware of recommendations and need for GI consult.         Rosenbeck's 8- Point Penetration Aspiration Scale  Material does not enter airway  Material enters the airway, remains above the vocal folds and is ejected from the airway  Material enters the airway, remains above the vocal folds,  and is not ejected from the airway  Material enters the airway, contacts the vocal folds and is ejected from the airway  Material enters the airway, contacts the vocal folds, and is not ejected from the airway  Material enters the airway, passes below the vocal folds, and is ejected out of the trachea  Material enters the airway, passes below the vocal folds and is not ejected from the trachea despite effort  Material enters the airway, passes below the vocal folds, and no effort is made to eject.     SLP Recommendation and Plan:    - NPO w/ FWP- see below   - Water/ice only when pt is fully awake and alert  - Meds: via alterate route   - Safe swallow strategies: HOB at a 90 degree angle, slow rate of intake, small sips  -Oral care at least x2 daily   -ST will continue to follow as per current plan of care and with additional goals/recommendations as indicated  - GI consult for poor cricopharyngeus opening      The rationale to recommend water/ice chips when a PO diet cannot appropriately/functionally sustain nutrition is because water is low risk for aspiration pna when compared to aspiration of food or other liquids.       Benefits of a water protocol include but are not limited to:      Oral gratification   Engagement of oropharyngeal swallow musculature   Decrease likelihood of dehydration       Guidelines for proper implementation include:  Thorough oral care prior to consuming water  Upright at 90 degree hip flexion  Small sips at slow rate     Monitor for any changes in respiratory status and discontinue if distress noted     The [place] Free Water Protocol is a research based protocol established in 1984.    Anticipated Discharge Disposition (SLP): unknown          SLP Swallowing Diagnosis: moderate, oral dysphagia, pharyngeal dysphagia, mod-severe (10/19/24 1400)        Plan for Continued Treatment (SLP): continue treatment per plan of care (10/19/24 1400)

## 2024-10-19 NOTE — PROGRESS NOTES
LOS: 2 days     Chief Complaint: Confusion, weakness, mental status change       SUBJECTIVE:    History taken from: chart RN    Interval History: No events overnight. He is more awake today.         Review of Systems   Unable to perform ROS: Dementia           Pertinent PMH:  has no past medical history on file.   ________________________________________________     OBJECTIVE:    Patient resting in bed, appears chronically ill  Patient is resting in bed but easily arousable to voice  Oriented to self  Answers some simple questions  Able to tell me what is on TV and what his favorite sport is  Follows commands  Squeezes hands bilaterally and wiggles toes  There is multifocal myoclonus  Neck is supple     ________________________________________________   RESULTS REVIEW    VITAL SIGNS:  Temp:  [97.4 °F (36.3 °C)-98.6 °F (37 °C)] 97.4 °F (36.3 °C)  Heart Rate:  [76-94] 76  Resp:  [15-24] 24  BP: (143-176)/() 143/90    LABS:       Lab 10/19/24  0217 10/18/24  0235 10/17/24  0416 10/16/24  1103   WBC 9.29 12.09* 10.96* 8.39   HEMOGLOBIN 10.6* 9.4* 9.0* 9.2*   HEMATOCRIT 34.0* 29.9* 29.5* 29.9*   PLATELETS 248 224 236 226   NEUTROS ABS 6.95 9.44* 8.51* 6.44   IMMATURE GRANS (ABS) 0.03 0.06* 0.04 0.03   LYMPHS ABS 1.17 1.30 1.19 1.02   MONOS ABS 0.95* 1.23* 1.12* 0.68   EOS ABS 0.14 0.02 0.04 0.15   MCV 92.6 94.3 94.9 94.9         Lab 10/19/24  0217 10/18/24  0235 10/17/24  0416 10/16/24  1103   SODIUM 145 142 146* 147*   POTASSIUM 3.0* 3.8 3.9 4.1   CHLORIDE 111* 108* 110* 109*   CO2 24.1 24.2 22.6 25.8   ANION GAP 9.9 9.8 13.4 12.2   BUN 10 10 11 10   CREATININE 0.76 0.86 1.00 1.14   EGFR 96.7 93.1 81.0 69.2   GLUCOSE 164* 98 94 68   CALCIUM 9.1 8.6 8.5* 8.8   MAGNESIUM 2.0 1.7 1.8 1.8   PHOSPHORUS 2.2* 2.3* 2.4*  --    TSH  --   --   --  2.200         Lab 10/16/24  1103   TOTAL PROTEIN 6.2   ALBUMIN 3.2*   GLOBULIN 3.0   ALT (SGPT) 18   AST (SGOT) 24   BILIRUBIN 1.1   ALK PHOS 289*                     Lab  10/16/24  1426   PH, ARTERIAL 7.477*   PCO2, ARTERIAL 30.5*   PO2 ART 90.9   O2 SATURATION ART 97.7   FIO2 21   HCO3 ART 22.6   BASE EXCESS ART -0.6*     UA          10/6/2024    16:54 10/16/2024    10:38 10/19/2024    07:16   Urinalysis   Squamous Epithelial Cells, UA   0-2    Specific Gravity, UA 1.010  1.016  1.029    Ketones, UA Negative  15 mg/dL (1+)  Trace    Blood, UA Negative  Negative  Negative    Leukocytes, UA Negative  Negative  Moderate (2+)    Nitrite, UA Negative  Negative  Positive    RBC, UA   3-5    WBC, UA   21-50    Bacteria, UA   4+        Lab Results   Component Value Date    TSH 2.200 10/16/2024         IMAGING STUDIES:  CT Angiogram Carotids    Result Date: 10/18/2024  Impression: 1. Widely patent vertebral arteries bilaterally as well as the basilar artery. Both posterior cerebral arteries appear patent. 2. Mild calcific plaquing, bilateral carotid bulbs, without evidence of angiographically significant stenosis on either side, with application of NASCET criteria to this examination. 3. Additional findings, both vascular and nonvascular, as described above in detail. Electronically Signed: Chacha Chu MD  10/18/2024 12:19 PM EDT  Workstation ID: [id number]    MRI Brain With & Without Contrast    Result Date: 10/18/2024  Impression: 1. No acute intracranial findings. 2. Moderate atrophy and moderate chronic microvascular disease. 3. Right mastoid effusion. Correlate for mastoiditis. Electronically Signed: Berenice Corbin MD  10/18/2024 10:40 AM EDT  Workstation ID: [id number]     I reviewed the patient's new clinical results.    ________________________________________________      PROBLEM LIST:    AMS (altered mental status)    Altered mental status        ASSESSMENT/PLAN:    1. Delirium with underlying cognitive decline. Improving. Pt is improving and is now being treated for urinary tract infection.  - MRI brain wo contrast reviewed- no acute or subacute findings. Nothing that suggest  encephalitis.   - NCCT was negative for acute finding. Pt had significant brain atrophy and small vessel disease.   - psychiatry following   - Will defer LP at this time     2.  Involuntary movements, myoclonus, atypical.   -- resume amantadine- pt needs referral to outpatient movement specialist for full work up if continues    3.  Urinary tract infection  -Culture pending, antibiotics per primary    There are no further recommendations. Will sign off, please call with any questions or concerns.        I discussed the patient's findings and my recommendations with nursing staff    Saira Samayoa, NANCY  10/19/24  09:13 EDT

## 2024-10-19 NOTE — PROGRESS NOTES
[facility] MEDICINE SERVICE  DAILY PROGRESS NOTE    NAME: Shahram Chaney  : 1955  MRN: [id number]      LOS: 2 days     PROVIDER OF SERVICE: Jose Roberto Mahmood MD    Chief Complaint: AMS (altered mental status)    Subjective:     Interval History:  History taken from: patient    Waking more up and has been talking somewhat today.        Review of Systems:   Review of Systems    Objective:     Vital Signs  Temp:  [97.4 °F (36.3 °C)-98.7 °F (37.1 °C)] 98.7 °F (37.1 °C)  Heart Rate:  [76-94] 76  Resp:  [15-24] 19  BP: (143-168)/() 143/90   Body mass index is 22.66 kg/m².    Physical Exam  Physical Exam  Constitutional:       Comments: Chronically ill appearing elderly gentleman   HENT:      Head: Normocephalic and atraumatic.   Cardiovascular:      Rate and Rhythm: Normal rate and regular rhythm.      Pulses: Normal pulses.      Heart sounds: Normal heart sounds.   Pulmonary:      Effort: Pulmonary effort is normal.      Breath sounds: Normal breath sounds.   Abdominal:      General: Abdomen is flat.      Palpations: Abdomen is soft.   Neurological:      General: No focal deficit present.      Mental Status: He is alert.      Comments: Unable to comply with neurological exam.  No meningeal signs.  Normal tone.  No spasticity.            Diagnostic Data    Results from last 7 days   Lab Units 10/19/24  0217 10/17/24  0416 10/16/24  1103   WBC 10*3/mm3 9.29   < > 8.39   HEMOGLOBIN g/dL 10.6*   < > 9.2*   HEMATOCRIT % 34.0*   < > 29.9*   PLATELETS 10*3/mm3 248   < > 226   GLUCOSE mg/dL 164*   < > 68   CREATININE mg/dL 0.76   < > 1.14   BUN mg/dL 10   < > 10   SODIUM mmol/L 145   < > 147*   POTASSIUM mmol/L 3.0*   < > 4.1   AST (SGOT) U/L  --   --  24   ALT (SGPT) U/L  --   --  18   ALK PHOS U/L  --   --  289*   BILIRUBIN mg/dL  --   --  1.1   ANION GAP mmol/L 9.9   < > 12.2    < > = values in this interval not displayed.       CT Angiogram Carotids    Result Date: 10/18/2024  Impression: 1. Widely patent  vertebral arteries bilaterally as well as the basilar artery. Both posterior cerebral arteries appear patent. 2. Mild calcific plaquing, bilateral carotid bulbs, without evidence of angiographically significant stenosis on either side, with application of NASCET criteria to this examination. 3. Additional findings, both vascular and nonvascular, as described above in detail. Electronically Signed: Chacha Chu MD  10/18/2024 12:19 PM EDT  Workstation ID: [id number]    MRI Brain With & Without Contrast    Result Date: 10/18/2024  Impression: 1. No acute intracranial findings. 2. Moderate atrophy and moderate chronic microvascular disease. 3. Right mastoid effusion. Correlate for mastoiditis. Electronically Signed: Berenice Corbin MD  10/18/2024 10:40 AM EDT  Workstation ID: [id number]       I reviewed the patient's new clinical results.    Assessment/Plan:     Active and Resolved Problems  Active Hospital Problems    Diagnosis  POA    **AMS (altered mental status) [R41.82]  Yes    Altered mental status [R41.82]  Yes      Resolved Hospital Problems   No resolved problems to display.       # Delirium  Acute urinary tract infection  Aspiration pneumonitis?  -pt is sent to ED for physical weakness jerky movement and lethargic decreased intake ( he is AAO 1 baseline )   -Seems to be a more acute finding on top of his normal baseline and is occurring in the setting of what seems like a new UTI and possible aspiration pneumonitis.  -Treat the new urinalysis UTI with ceftriaxone.  Speech to evaluate patient.  -He is improving today.        #Reported aspiration   -SLP to see   -Diet as appropriate  -Hypoglycemia management    VTE Prophylaxis:  Mechanical VTE prophylaxis orders are present.             Disposition Planning:     Barriers to Discharge: Mental status, workup  Anticipated Date of Discharge: 10/20/2024  Place of Discharge: Return to facility      Time: 50 minutes     There are no questions and answers to display.        Signature: Electronically signed by Jose Roberto Mahmood MD, 10/19/24, 12:02 EDT.  [facility]

## 2024-10-19 NOTE — PLAN OF CARE
Problem: Adult Inpatient Plan of Care  Goal: Plan of Care Review  Outcome: Progressing  Flowsheets (Taken 10/19/2024 1541)  Progress: improving  Plan of Care Reviewed With: patient  Goal: Patient-Specific Goal (Individualized)  Outcome: Progressing  Goal: Absence of Hospital-Acquired Illness or Injury  Outcome: Progressing  Intervention: Identify and Manage Fall Risk  Recent Flowsheet Documentation  Taken 10/19/2024 1528 by Scarlet Olson, ALBERT  Safety Promotion/Fall Prevention:   assistive device/personal items within reach   clutter free environment maintained   fall prevention program maintained   nonskid shoes/slippers when out of bed   safety round/check completed   room organization consistent  Taken 10/19/2024 1433 by Scarlet Olson, ALBERT  Safety Promotion/Fall Prevention:   assistive device/personal items within reach   clutter free environment maintained   fall prevention program maintained   nonskid shoes/slippers when out of bed   safety round/check completed   room organization consistent  Taken 10/19/2024 1142 by Scarlet Olson, ALBERT  Safety Promotion/Fall Prevention:   assistive device/personal items within reach   clutter free environment maintained   fall prevention program maintained   nonskid shoes/slippers when out of bed   safety round/check completed   room organization consistent  Taken 10/19/2024 1022 by Scarlet Olson, ALBERT  Safety Promotion/Fall Prevention:   assistive device/personal items within reach   clutter free environment maintained   fall prevention program maintained   nonskid shoes/slippers when out of bed   safety round/check completed   room organization consistent  Taken 10/19/2024 0905 by Scarlet Olson, ALBERT  Safety Promotion/Fall Prevention:   assistive device/personal items within reach   clutter free environment maintained   fall prevention program maintained   nonskid shoes/slippers when out of bed   safety round/check completed   room organization consistent  Intervention: Prevent  Skin Injury  Recent Flowsheet Documentation  Taken 10/19/2024 1528 by Scarlet Olson RN  Body Position: sitting up in bed  Taken 10/19/2024 1142 by Scarlet Olson RN  Body Position:   heels elevated   legs elevated   supine  Skin Protection: transparent dressing maintained  Taken 10/19/2024 0905 by Scarlet Olson RN  Body Position: supine  Skin Protection: transparent dressing maintained  Intervention: Prevent and Manage VTE (Venous Thromboembolism) Risk  Recent Flowsheet Documentation  Taken 10/19/2024 1528 by Scarlet Olson RN  VTE Prevention/Management:   bilateral   SCDs (sequential compression devices) off   patient refused intervention  Taken 10/19/2024 1142 by Scarlet Olson RN  VTE Prevention/Management:   bilateral   SCDs (sequential compression devices) off   patient refused intervention  Taken 10/19/2024 0905 by Scarlet Olson RN  VTE Prevention/Management:   bilateral   SCDs (sequential compression devices) off   patient refused intervention  Intervention: Prevent Infection  Recent Flowsheet Documentation  Taken 10/19/2024 1528 by Scarlet Olson RN  Infection Prevention:   environmental surveillance performed   hand hygiene promoted   rest/sleep promoted   single patient room provided  Taken 10/19/2024 1433 by Scarlet Olson RN  Infection Prevention:   environmental surveillance performed   hand hygiene promoted   rest/sleep promoted   single patient room provided  Taken 10/19/2024 1142 by Scarlet Olson RN  Infection Prevention:   environmental surveillance performed   hand hygiene promoted   rest/sleep promoted   single patient room provided  Taken 10/19/2024 1022 by Scarlet Oslon RN  Infection Prevention:   environmental surveillance performed   hand hygiene promoted   rest/sleep promoted   single patient room provided  Taken 10/19/2024 0905 by Scarlet Olson RN  Infection Prevention:   environmental surveillance performed   hand hygiene promoted   rest/sleep promoted   single patient room  provided  Goal: Optimal Comfort and Wellbeing  Outcome: Progressing  Intervention: Provide Person-Centered Care  Recent Flowsheet Documentation  Taken 10/19/2024 1528 by Scarlet Olson RN  Trust Relationship/Rapport:   care explained   thoughts/feelings acknowledged  Taken 10/19/2024 1142 by Scarlet Olson RN  Trust Relationship/Rapport:   care explained   thoughts/feelings acknowledged  Taken 10/19/2024 0905 by Scarlet Olson RN  Trust Relationship/Rapport:   care explained   thoughts/feelings acknowledged  Goal: Readiness for Transition of Care  Outcome: Progressing     Problem: Fall Injury Risk  Goal: Absence of Fall and Fall-Related Injury  Outcome: Progressing  Intervention: Identify and Manage Contributors  Recent Flowsheet Documentation  Taken 10/19/2024 1528 by Scarlet Olson RN  Medication Review/Management: medications reviewed  Taken 10/19/2024 1433 by Scarlet Olson RN  Medication Review/Management: medications reviewed  Taken 10/19/2024 1142 by Scarlet Olson RN  Medication Review/Management: medications reviewed  Taken 10/19/2024 1022 by Scarlet Olson RN  Medication Review/Management: medications reviewed  Taken 10/19/2024 0905 by Scarlet Olson RN  Medication Review/Management: medications reviewed  Intervention: Promote Injury-Free Environment  Recent Flowsheet Documentation  Taken 10/19/2024 1528 by Scarlet Olson RN  Safety Promotion/Fall Prevention:   assistive device/personal items within reach   clutter free environment maintained   fall prevention program maintained   nonskid shoes/slippers when out of bed   safety round/check completed   room organization consistent  Taken 10/19/2024 1433 by Scarlet Olson RN  Safety Promotion/Fall Prevention:   assistive device/personal items within reach   clutter free environment maintained   fall prevention program maintained   nonskid shoes/slippers when out of bed   safety round/check completed   room organization consistent  Taken 10/19/2024 1142 by  Moerer, Scarlet, RN  Safety Promotion/Fall Prevention:   assistive device/personal items within reach   clutter free environment maintained   fall prevention program maintained   nonskid shoes/slippers when out of bed   safety round/check completed   room organization consistent  Taken 10/19/2024 1022 by Scarlet Olson RN  Safety Promotion/Fall Prevention:   assistive device/personal items within reach   clutter free environment maintained   fall prevention program maintained   nonskid shoes/slippers when out of bed   safety round/check completed   room organization consistent  Taken 10/19/2024 0905 by Scarlet Olson RN  Safety Promotion/Fall Prevention:   assistive device/personal items within reach   clutter free environment maintained   fall prevention program maintained   nonskid shoes/slippers when out of bed   safety round/check completed   room organization consistent     Problem: Skin Injury Risk Increased  Goal: Skin Health and Integrity  Outcome: Progressing  Intervention: Optimize Skin Protection  Recent Flowsheet Documentation  Taken 10/19/2024 1528 by Scarlet Olson RN  Activity Management: activity minimized  Head of Bed (HOB) Positioning: HOB elevated  Taken 10/19/2024 1142 by Scarlet Olson RN  Activity Management: activity minimized  Pressure Reduction Techniques: frequent weight shift encouraged  Head of Bed (HOB) Positioning: HOB elevated  Pressure Reduction Devices: pressure-redistributing mattress utilized  Skin Protection: transparent dressing maintained  Taken 10/19/2024 0905 by Scarlet Olson RN  Activity Management: activity encouraged  Pressure Reduction Techniques: frequent weight shift encouraged  Head of Bed (HOB) Positioning: HOB elevated  Pressure Reduction Devices: pressure-redistributing mattress utilized  Skin Protection: transparent dressing maintained     Problem: Confusion Acute  Goal: Optimal Cognitive Function  Outcome: Progressing  Intervention: Minimize Contributing  Factors  Recent Flowsheet Documentation  Taken 10/19/2024 1528 by Scarlet Olson RN  Reorientation Measures: reorientation provided  Communication Support Strategies: active listening utilized  Taken 10/19/2024 1142 by Scarlet Olson RN  Reorientation Measures: reorientation provided  Communication Support Strategies: active listening utilized  Taken 10/19/2024 0905 by Scarlet Olson RN  Reorientation Measures: reorientation provided  Communication Support Strategies: active listening utilized     Problem: Seizure, Active Management  Goal: Absence of Seizure/Seizure-Related Injury  Outcome: Progressing     Problem: Malnutrition  Goal: Improved Nutritional Intake  Outcome: Progressing     Problem: Electrolyte Imbalance  Goal: Electrolyte Balance  Outcome: Progressing     Problem: Hospitalized Older Adult  Goal: Optimal Cognitive Function  Outcome: Progressing  Intervention: Minimize Contributing Factors  Recent Flowsheet Documentation  Taken 10/19/2024 1528 by Scarlet Olson RN  Reorientation Measures: reorientation provided  Taken 10/19/2024 1142 by Scarlet Olson RN  Reorientation Measures: reorientation provided  Taken 10/19/2024 0905 by Scarlet Olson RN  Reorientation Measures: reorientation provided  Goal: Effective Bowel Elimination  Outcome: Progressing  Goal: Optimal Coping  Outcome: Progressing  Intervention: Promote Psychosocial Wellbeing  Recent Flowsheet Documentation  Taken 10/19/2024 1528 by Scarlet Olson RN  Family/Support System Care: self-care encouraged  Taken 10/19/2024 1142 by Scarlet Olson RN  Family/Support System Care: self-care encouraged  Taken 10/19/2024 0905 by Scarlet Olson RN  Family/Support System Care: self-care encouraged  Goal: Fluid and Electrolyte Balance  Outcome: Progressing  Goal: Optimal Functional Ability  Outcome: Progressing  Intervention: Promote Functional Ability  Recent Flowsheet Documentation  Taken 10/19/2024 1528 by Scarlet Olson RN  Activity Management: activity  minimized  Activity Assistance Provided: assistance, 2 people  Taken 10/19/2024 1142 by Scarlet Olson RN  Activity Management: activity minimized  Activity Assistance Provided: assistance, 2 people  Taken 10/19/2024 0905 by Scarlet Olson RN  Activity Management: activity encouraged  Activity Assistance Provided: assistance, 2 people  Goal: Improved Oral Intake  Outcome: Progressing  Goal: Adequate Sleep/Rest  Outcome: Progressing  Goal: Effective Urinary Elimination  Outcome: Progressing     Problem: Wound  Goal: Optimal Coping  Outcome: Progressing  Intervention: Support Patient and Family Response  Recent Flowsheet Documentation  Taken 10/19/2024 1528 by Scarlet Olson RN  Family/Support System Care: self-care encouraged  Taken 10/19/2024 1142 by Scarlet Olson RN  Family/Support System Care: self-care encouraged  Taken 10/19/2024 0905 by Scarlet Olson RN  Family/Support System Care: self-care encouraged  Goal: Optimal Functional Ability  Outcome: Progressing  Intervention: Optimize Functional Ability  Recent Flowsheet Documentation  Taken 10/19/2024 1528 by Scarlet Olson RN  Activity Management: activity minimized  Activity Assistance Provided: assistance, 2 people  Taken 10/19/2024 1142 by Scarlet Olson RN  Activity Management: activity minimized  Activity Assistance Provided: assistance, 2 people  Taken 10/19/2024 0905 by Scarlet Olson RN  Activity Management: activity encouraged  Activity Assistance Provided: assistance, 2 people  Goal: Absence of Infection Signs and Symptoms  Outcome: Progressing  Goal: Improved Oral Intake  Outcome: Progressing  Goal: Optimal Pain Control and Function  Outcome: Progressing  Goal: Skin Health and Integrity  Outcome: Progressing  Intervention: Optimize Skin Protection  Recent Flowsheet Documentation  Taken 10/19/2024 1528 by Scarlet Olson RN  Activity Management: activity minimized  Head of Bed (HOB) Positioning: HOB elevated  Taken 10/19/2024 1142 by Scarlet Olson  RN  Activity Management: activity minimized  Pressure Reduction Techniques: frequent weight shift encouraged  Head of Bed (HOB) Positioning: HOB elevated  Pressure Reduction Devices: pressure-redistributing mattress utilized  Taken 10/19/2024 0905 by Scarlet Olson, ALBERT  Activity Management: activity encouraged  Pressure Reduction Techniques: frequent weight shift encouraged  Head of Bed (HOB) Positioning: HOB elevated  Pressure Reduction Devices: pressure-redistributing mattress utilized  Goal: Optimal Wound Healing  Outcome: Progressing     Problem: Glycemic Control Impaired  Goal: Blood Glucose Level Within Target Range  Outcome: Progressing  Goal: Minimize Hypoglycemia Risk  Outcome: Progressing   Goal Outcome Evaluation:  Plan of Care Reviewed With: patient        Progress: improving

## 2024-10-19 NOTE — NURSING NOTE
Pt. Became more alert through out shift and able to answer more orientation questions and hold meaningful conversation with nursing staff. Pt. Able to state he was in a hospital but unable to tell location or name of hospital. Pt. Able to give full name and birthday. Pt. Also able to tell nurse the year, unable to identify month or day.

## 2024-10-19 NOTE — THERAPY TREATMENT NOTE
"Subjective: Pt agreeable to therapeutic plan of care. Pt just got back from video swallow and RN stated  he's getting dobb matti.    Objective:     Precautions - falls, seizures    Bed mobility - Min-A  Transfers - Min-A, Assist x 2, and with rolling walker to CTS at rwx 2x approx 20 secs each before fatigued  Ambulation -  feet N/A or Not attempted.    Therapeutic Exercise - 10 Reps B LE AROM supported sitting / EOB    Vitals: WNL    Pain:  c/o throat being sore  Intervention for pain: Repositioned, RN notified, and Therapeutic Presence    Education: Provided education on the importance of mobility in the acute care setting, Verbal/Tactile Cues, and Transfer Training    Assessment: Shahram Chaney presents with functional mobility impairments which indicate the need for skilled intervention. Tolerating session today without incident. Pt much more mobile today but still weak and fatigues easily, Was too shaky to try and walk. Req constant tactile and vcs's to wt shift forward and keep feet on floor. Plans on rehab at [place].Will continue to follow and progress as tolerated.     Plan/Recommendations:   If medically appropriate, Moderate Intensity Therapy recommended post-acute care. This is recommended as therapy feels the patient would require 3-4 days per week and wouldn't tolerate \"3 hour daily\" rehab intensity. SNF would be the preferred choice. If the patient does not agree to SNF, arrange HH or OP depending on home bound status. If patient is medically complex, consider LTACH. Pt requires no DME at discharge.     Pt desires Skilled Rehab placement at discharge. Pt cooperative; agreeable to therapeutic recommendations and plan of care.         Basic Mobility 6-click:  Rollin = Total, A lot = 2, A little = 3; 4 = None  Supine>Sit:   1 = Total, A lot = 2, A little = 3; 4 = None   Sit>Stand with arms:  1 = Total, A lot = 2, A little = 3; 4 = None  Bed>Chair:   1 = Total, A lot = 2, A little = 3; 4 = None  Ambulate " in room:  1 = Total, A lot = 2, A little = 3; 4 = None  3-5 Steps with railin = Total, A lot = 2, A little = 3; 4 = None  Score: 14    Post-Tx Position: Supine with HOB Elevated, Staff Present, Alarms activated, and Call light and personal items within reach  PPE: gloves

## 2024-10-19 NOTE — CONSULTS
"Nutrition Services    Patient Name: Shahram Chaney  YOB: 1955  MRN: [id number]  Admission date: 10/16/2024    EN Prescription: Begin with Isosource 1.5 at 30mL/hr + 30mL/hr water flush     CLINICAL NUTRITION ASSESSMENT      Reason for Assessment 10/19: Consult for TF      H&P      History reviewed. No pertinent past medical history.    History reviewed. No pertinent surgical history.     Current Problems   Delirium; Acute urinary tract infection; Aspiration pneumonitis(?)  -sent to ED for physical weakness jerky movement and lethargic decreased intake   -on antibiotics for UTI     Reported aspiration   -SLP following -- unsafe for PO per VFSS on 10/19       Encounter Information        Trending Narrative     10/19: Received consult for tube feeding. Worked up TF and entered order for starter TF of Isosource 1.5 @ 30mL/hour and spoke with RN via secure chat about plan. Per RN, pt to have further workup with VFSS later today before actually starting TF. Update as of 16:00; updated VFSS has now been done, and pt did not pass test, showing risks of aspiration with any intakes. ST has recommended NPO with Corb[place] water protocol and alternative route for nutrition. Will proceed with plan for TF. Unable to conduct NFPE this date as pt having other test; will follow up as able.     Anthropometrics        Current Height, Weight Height: 172.7 cm (68\")  Weight: 71.6 kg (157 lb 13.6 oz) (10/19/24 1236)       Usual Body Weight (UBW) UTD       Trending Weight Hx     This admission: 10/19: Scale weight 71.6 kg             PTA: 10/19: No extended weight Hx available -- will monitor     Wt Readings from Last 30 Encounters:   10/19/24 1236 71.6 kg (157 lb 13.6 oz)   10/16/24 1920 67.6 kg (149 lb 0.5 oz)   10/16/24 1028 69.4 kg (153 lb)   10/06/24 1539 70.8 kg (156 lb 1.4 oz)      BMI kg/m2 Body mass index is 24 kg/m².       Labs        Pertinent Labs    Results from last 7 days   Lab Units 10/19/24  0217 10/18/24  0235 " "10/17/24  0416 10/16/24  1103   SODIUM mmol/L 145 142 146* 147*   POTASSIUM mmol/L 3.0* 3.8 3.9 4.1   CHLORIDE mmol/L 111* 108* 110* 109*   CO2 mmol/L 24.1 24.2 22.6 25.8   BUN mg/dL 10 10 11 10   CREATININE mg/dL 0.76 0.86 1.00 1.14   CALCIUM mg/dL 9.1 8.6 8.5* 8.8   BILIRUBIN mg/dL  --   --   --  1.1   ALK PHOS U/L  --   --   --  289*   ALT (SGPT) U/L  --   --   --  18   AST (SGOT) U/L  --   --   --  24   GLUCOSE mg/dL 164* 98 94 68     Results from last 7 days   Lab Units 10/19/24  0217 10/18/24  0235 10/17/24  0416   MAGNESIUM mg/dL 2.0 1.7 1.8   PHOSPHORUS mg/dL 2.2* 2.3* 2.4*   HEMOGLOBIN g/dL 10.6* 9.4* 9.0*   HEMATOCRIT % 34.0* 29.9* 29.5*     No results found for: \"HGBA1C\"     Medications    Scheduled Medications amantadine, 100 mg, Oral, Q12H  Barium Sulfate, 1 teaspoon(s), Oral, Once in imaging  barium sulfate, 50 mL, Oral, Once in imaging  barium sulfate, 50 mL, Oral, Once in imaging  cefTRIAXone, 1,000 mg, Intravenous, Q24H  mirtazapine, 7.5 mg, Oral, Nightly        Infusions dextrose 5 % and sodium chloride 0.45 %, 75 mL/hr, Last Rate: 75 mL/hr (10/19/24 1033)        PRN Medications   acetaminophen **OR** acetaminophen **OR** acetaminophen    aluminum-magnesium hydroxide-simethicone    senna-docusate sodium **AND** polyethylene glycol **AND** bisacodyl **AND** bisacodyl    Calcium Replacement - Follow Nurse / BPA Driven Protocol    dextrose    dextrose    glucagon (human recombinant)    hydrALAZINE    Magnesium Standard Dose Replacement - Follow Nurse / BPA Driven Protocol    melatonin    nitroglycerin    ondansetron ODT **OR** ondansetron    Phosphorus Replacement - Follow Nurse / BPA Driven Protocol    Potassium Replacement - Follow Nurse / BPA Driven Protocol    sodium chloride     Physical Findings        Trending Physical   Appearance, NFPE 10/19: Unable to complete NFPE portion of exam today; will follow up   --  Edema  +1 R leg      Bowel Function Stool Output  Stool Consistency: loose (10/18/24 " 2000)  Perineal Care: perineum cleansed, protective cream/ointment applied (10/18/24 2310)      Tubes Does not yet have enteral access, but NG planned      Chewing/Swallowing Unable per VFSS today 10/19     Skin No pressure injuries documented currently        Estimated/Assessed Needs    Assessed 10/19/24   Energy Requirements    EST Needs, Method, Wt used 2148 kcal/day (30 kcal/kg actual weight)        Protein Requirements    EST Needs, Method, Wt used 93 g/day (1.3 g/kg actual weight)        Fluid Requirements     Estimated Needs (mL/day) 1mL/kcal - monitor hydration/volume status      Current Nutrition Orders & Evaluation of Intake       Oral Nutrition     Food Allergies NKFA   Current PO Diet NPO Diet NPO Type: Tube Feeding   Supplement None ordered    PO Evaluation     Trending % PO Intake 10/19: NPO     Enteral Nutrition    Enteral Route Does not yet have enteral access    Order, Modulars, Flushes    Tolerance    TF Observation         Parenteral Nutrition     TPN Route    Total # Days on TPN    TPN Order, Lipid Details    MVI & Trace Element Freq    TPN Observation       Nutritional Risk Screening        NRS-2002 Score          Nutrition Diagnosis         Nutrition Dx Problem 1 Swallowing difficulty R/t oral and pharyngeal dysphagia; as evidenced by results of swallow study with recommendation for tube feeding.      Nutrition Dx Problem 2        Intervention Goal         Intervention Goal(s) Pt tolerating EN well      Nutrition Intervention        RD Action Will work up TF   Spoke with RN via secure chat regarding plan      Nutrition Prescription          Diet Prescription NPO   Supplement Prescription None      Enteral Prescription Initial Goal:  *initial goal conservative d/t risk of RFS     Isosource 1.5 at 30mL/hr + 30mL/hr water flush      End Goal:    Isosource 1.5 at 65 mL/hr; will adjust water flush per clinical picture      Calories  2145 kcals (100%)    Protein  97 g (104%)    Free water  1087 mL    Flushes  Will adjust per clinical picture      The above end goal rate is for 22 hrs/day to assume interruptions for ADLs. Water flushes adjusted based on clinical picture + Rx flushes/IV fluids          TPN Prescription      Monitor/Evaluation        Monitor I&O, Pertinent labs, Weight, Skin status, GI status, POC/GOC     Electronically signed by:  Helena Andres, ZION  10/19/24

## 2024-10-19 NOTE — MBS/VFSS/FEES
Acute Care - Speech Language Pathology   Swallow Initial Evaluation  [facility]     Patient Name: Shahram Chaney  : 1955  MRN: [id number]  Today's Date: 10/19/2024               Admit Date: 10/16/2024    Visit Dx:     ICD-10-CM ICD-9-CM   1. Altered mental status, unspecified altered mental status type  R41.82 780.97     Patient Active Problem List   Diagnosis    AMS (altered mental status)    Altered mental status     History reviewed. No pertinent past medical history.  History reviewed. No pertinent surgical history.    SLP Recommendation and Plan  SLP Swallowing Diagnosis: moderate, oral dysphagia, pharyngeal dysphagia, mod-severe (10/19/24 1400)  SLP Diet Recommendation: NPO, ice chips between meals after oral care, with supervision, water between meals after oral care, with supervision (10/19/24 1400)  Recommended Precautions and Strategies: general aspiration precautions (10/19/24 1400)  SLP Rec. for Method of Medication Administration: meds via alternate route (10/19/24 1400)     Monitor for Signs of Aspiration: yes, notify SLP if any concerns (10/19/24 1400)     Swallow Criteria for Skilled Therapeutic Interventions Met: demonstrates skilled criteria (10/19/24 1400)  Anticipated Discharge Disposition (SLP): unknown (10/19/24 1400)  Rehab Potential/Prognosis, Swallowing: good, to achieve stated therapy goals (10/19/24 1400)  Therapy Frequency (Swallow): PRN (10/19/24 1400)  Predicted Duration Therapy Intervention (Days): until discharge (10/19/24 1400)  Oral Care Recommendations: Oral Care before breakfast, after meals and PRN (10/19/24 1400)                              Plan for Continued Treatment (SLP): continue treatment per plan of care (10/19/24 1400)                SWALLOW EVALUATION (Last 72 Hours)       SLP Adult Swallow Evaluation       Row Name 10/19/24 1400       Rehab Evaluation    Document Type evaluation  -MS    Subjective Information no complaints  -MS    Patient Observations  alert;cooperative;agree to therapy  -MS    Patient Effort good  -MS    Comment --    Symptoms Noted During/After Treatment none  -MS       General Information    Patient Profile Reviewed yes  -MS    Pertinent History Of Current Problem Per ED: Pt is sent to ED for physical weakness jerky movement and lethargic decreased intake ( he is AAO 1 baseline). Patient has underlying psychiatric disorder. Per the nursing home report, patient has yousif episode, found outside of facility and multiple fall needed going to ED in early October and at that time they could not find the reason for mental status. Patient baseline mental status is AAO x 1 alert to self only. He can have hallucination. He is ambulatory in the baseline and he tried to run off from the facility time to time per the report. Pt baseline diet is Mechanical soft/thin. ST completed VFSS this date secondary to concern for penetration/aspiration at bedside.          -MS    Current Method of Nutrition NPO  -MS    Precautions/Limitations, Vision WFL  -MS    Precautions/Limitations, Hearing WFL  -MS    Prior Level of Function-Communication cognitive-linguistic impairment  -MS    Prior Level of Function-Swallowing thin liquids;mechanical ground textures  -MS    Plans/Goals Discussed with patient  -MS    Barriers to Rehab none identified  -MS    Patient's Goals for Discharge patient did not state  -MS       Pain    Pretreatment Pain Rating 0/10 - no pain  -MS    Posttreatment Pain Rating 0/10 - no pain  -MS    Additional Documentation Pain Scale: FACES Pre/Post-Treatment (Group)  -MS       Pain Scale: FACES Pre/Post-Treatment    Pain: FACES Scale, Pretreatment 0-->no hurt  -MS    Posttreatment Pain Rating 0-->no hurt  -MS       Oral Motor Structure and Function    Dentition Assessment natural, present and adequate  -MS    Secretion Management --    Mucosal Quality dry  -MS    Volitional Swallow --    Volitional Cough --       Oral Musculature and Cranial Nerve  Assessment    Oral Motor General Assessment generalized oral motor weakness  -MS       General Eating/Swallowing Observations    Respiratory Support Currently in Use room air  -MS    Eating/Swallowing Skills --    Positioning During Eating --    Utensils Used --    Consistencies Trialed --       Clinical Swallow Eval    Oral Prep Phase --    Oral Transit --    Oral Residue --    Pharyngeal Phase --    Esophageal Phase --       MBS/VFSS    Utensils Used spoon;cup;straw  -MS    Consistencies Trialed honey-thick liquids;nectar/syrup-thick liquids;thin liquids;pureed  -MS       MBS/VFSS Interpretation    Oral Prep Phase WFL  -MS    Oral Transit Phase impaired  -MS    Oral Residue impaired  -MS    VFSS Summary VFSS EXPLANATION/ OUTCOME:    MBSS performed in the lateral projection with the following consistencies respectively: thin by cup x2,  puree x2, NTL by cup x2, NTL by straw x1, HTL by cup x2. All trials with adequate barium concentration for fluoroscopy view. All trials administered via pt. Room air.     Oral phase: Marked by slightly poor bolus cohesion with premature spillage to the vallecular level. There is no epiglottic deflection noted. Pt has cervical spinal hardware which appears to be affecting epiglottic deflection.     Pharyngeal phase: Shallow penetration noted during thin liquids via cup which does not clear. Shallow penetration during NTL via cup which clears. No instances of aspiration noted however pt remains at a high risk secondary to poor cricopharyngeal opening. Each trial given remained at the CP level. SLP had to cue pt to swallow to clear trials x2 during each trial given. Pt demonstrated independent swallow x1 during trials, however one swallow was not sufficient in clearing the residue. SLP cued x2 more swallows during trials. Poor pharyngeal squeeze with pervasive coating of all hypopharyngeal structures.    Esophageal view: unremarkable     Pt demonstrates a MODERATE-SEVERE swallow  impairment posing a significant risk of aspiration and malnutrition risk. Dysphagia is most prominently characterized by lack of epiglottic deflection, poor pharyngeal squeeze, poor BOT retraction, and reduced cricopharyngeal opening. This results in open/patent laryngeal vestibule, pharyngeal residue, & HIGH RISK of penetration/aspiration.     Strategies attempted: n/a    Education provided: Education provided to pt and the need to follow up with GI for possible esophageal dilation. Nursing staff made aware of recommendations and need for GI consult.         Rosenbeck's 8- Point Penetration Aspiration Scale  Material does not enter airway  Material enters the airway, remains above the vocal folds and is ejected from the airway  Material enters the airway, remains above the vocal folds, and is not ejected from the airway  Material enters the airway, contacts the vocal folds and is ejected from the airway  Material enters the airway, contacts the vocal folds, and is not ejected from the airway  Material enters the airway, passes below the vocal folds, and is ejected out of the trachea  Material enters the airway, passes below the vocal folds and is not ejected from the trachea despite effort  Material enters the airway, passes below the vocal folds, and no effort is made to eject.     SLP Recommendation and Plan:    - NPO w/ FWP- see below   - Water/ice only when pt is fully awake and alert  - Meds: via alterate route   - Safe swallow strategies: HOB at a 90 degree angle, slow rate of intake, small sips  -Oral care at least x2 daily   -ST will continue to follow as per current plan of care and with additional goals/recommendations as indicated  - GI consult for poor cricopharyngeus opening      The rationale to recommend water/ice chips when a PO diet cannot appropriately/functionally sustain nutrition is because water is low risk for aspiration pna when compared to aspiration of food or other liquids.       Benefits  of a water protocol include but are not limited to:      Oral gratification   Engagement of oropharyngeal swallow musculature   Decrease likelihood of dehydration       Guidelines for proper implementation include:  Thorough oral care prior to consuming water  Upright at 90 degree hip flexion  Small sips at slow rate     Monitor for any changes in respiratory status and discontinue if distress noted     The [place] Free Water Protocol is a research based protocol established in 1984.             -MS       Oral Transit Phase    Impaired Oral Transit Phase premature spillage of liquids into pharynx  -MS    Premature Spillage of Liquids into Pharynx all consistencies tested  -MS       Oral Residue    Impaired Oral Residue diffuse residue throughout oral cavity  -MS    Diffuse Residue throughout Oral Cavity all consistencies tested  -MS    Response to Oral Residue with cued swallow;with liquid wash;partial residue clearance  -MS       Initiation of Pharyngeal Swallow    Initiation of Pharyngeal Swallow bolus in pyriform sinuses;bolus in valleculae  -MS    Pharyngeal Phase impaired pharyngeal phase of swallowing  -MS    Anatomical abnormalities noted c-spine hardware  -MS    Anatomical abnormalities functional impact functional impact on swallowing  -MS    Rosenbek's Scale 1--->level 1  -MS    Pharyngeal Residue all consistencies tested  -MS    Response to Residue partial residue clearance;cleared residue with liquid wash;cleared residue with cued swallow  -MS       Esophageal Phase    Esophageal Phase no impairments  -MS       SLP Communication to Radiology    Severity Level of Dysphagia pharyngeal dysfunction;oral dysfunction  -MS       SLP Evaluation Clinical Impression    SLP Swallowing Diagnosis moderate;oral dysphagia;pharyngeal dysphagia;mod-severe  -MS    Functional Impact risk of aspiration/pneumonia;risk of dehydration;risk of malnutrition  -MS    Rehab Potential/Prognosis, Swallowing good, to achieve stated  therapy goals  -MS    Swallow Criteria for Skilled Therapeutic Interventions Met demonstrates skilled criteria  -MS       SLP Treatment Clinical Impressions    Barriers to Overall Progress (SLP) Cognitive status  -MS    Plan for Continued Treatment (SLP) continue treatment per plan of care  -MS    Care Plan Review evaluation/treatment results reviewed  -MS       Recommendations    Therapy Frequency (Swallow) PRN  -MS    Predicted Duration Therapy Intervention (Days) until discharge  -MS    SLP Diet Recommendation NPO;ice chips between meals after oral care, with supervision;water between meals after oral care, with supervision  -MS    Recommended Diagnostics --    Recommended Precautions and Strategies general aspiration precautions  -MS    Oral Care Recommendations Oral Care before breakfast, after meals and PRN  -MS    SLP Rec. for Method of Medication Administration meds via alternate route  -MS    Monitor for Signs of Aspiration yes;notify SLP if any concerns  -MS    Anticipated Discharge Disposition (SLP) unknown  -MS       Swallow Goals (SLP)    Swallow LTGs Swallow Long Term Goal (free text)  -MS    Swallow STGs diet tolerance goal selection (SLP)  -MS    Diet Tolerance Goal Selection (SLP) Patient will tolerate trials of  -MS       (LTG) Swallow    (LTG) Swallow Patient will participate in ongoing assessment of swallow, including reevaluation of swallow clinically and/or including instrumental assessment of swallow if indicated, to further assess swallow function in anticipation of initiation of PO diet.  -MS    [place] (Swallow Long Term Goal) with minimal cues (75-90% accuracy)  -MS    Time Frame (Swallow Long Term Goal) 1 week  -MS    Barriers (Swallow Long Term Goal) --    Progress/Outcomes (Swallow Long Term Goal) goal ongoing  -MS    Comment (Swallow Long Term Goal) --       (STG) Patient will tolerate trials of    Consistencies Trialed (Tolerate trials) thin liquids;pureed textures  -MS     Desired Outcome (Tolerate trials) with adequate oral prep/transit/clearance;without signs of distress;without signs/symptoms of aspiration  -MS    [place] (Tolerate trials) with minimal cues (75-90% accuracy)  -MS    Time Frame (Tolerate trials) by discharge  -MS    Progress/Outcomes (Tolerate trials) new goal  -MS       (STG) Swallow 1    (STG) Swallow 1 The patient will participate in ongoing assessment of swallow, including re-evaluation clinically and/or including instrumental assessment of swallow if indicated, to further assess swallow function in anticipation to initiate a PO diet  -MS    [place] (Swallow Short Term Goal 1) with minimal cues (75-90% accuracy)  -MS    Time Frame (Swallow Short Term Goal 1) by discharge  -MS    Progress/Outcomes (Swallow Short Term Goal 1) goal ongoing  -MS    Comment (Swallow Short Term Goal 1) --              User Key  (r) = Recorded By, (t) = Taken By, (c) = Cosigned By      Initials Name Effective Dates    CP Delfina Hernández, SLP 06/16/21 -     Joe Whiting SLP 04/22/24 -     Briana Soto, BARON 06/18/24 -                     EDUCATION  The patient has been educated in the following areas:   Dysphagia (Swallowing Impairment) NPO rationale.        SLP GOALS       Row Name 10/19/24 1400       (LTG) Swallow    (LTG) Swallow Patient will participate in ongoing assessment of swallow, including reevaluation of swallow clinically and/or including instrumental assessment of swallow if indicated, to further assess swallow function in anticipation of initiation of PO diet.  -MS    [place] (Swallow Long Term Goal) with minimal cues (75-90% accuracy)  -MS    Time Frame (Swallow Long Term Goal) 1 week  -MS    Barriers (Swallow Long Term Goal) --    Progress/Outcomes (Swallow Long Term Goal) goal ongoing  -MS    Comment (Swallow Long Term Goal) --       (STG) Patient will tolerate trials of    Consistencies Trialed (Tolerate trials) thin liquids;pureed textures   -MS    Desired Outcome (Tolerate trials) with adequate oral prep/transit/clearance;without signs of distress;without signs/symptoms of aspiration  -MS    [place] (Tolerate trials) with minimal cues (75-90% accuracy)  -MS    Time Frame (Tolerate trials) by discharge  -MS    Progress/Outcomes (Tolerate trials) new goal  -MS       (STG) Swallow 1    (STG) Swallow 1 The patient will participate in ongoing assessment of swallow, including re-evaluation clinically and/or including instrumental assessment of swallow if indicated, to further assess swallow function in anticipation to initiate a PO diet  -MS    [place] (Swallow Short Term Goal 1) with minimal cues (75-90% accuracy)  -MS    Time Frame (Swallow Short Term Goal 1) by discharge  -MS    Progress/Outcomes (Swallow Short Term Goal 1) goal ongoing  -MS    Comment (Swallow Short Term Goal 1) --      Row Name 10/17/24 0800             (LTG) Swallow    (LTG) Swallow Patient will participate in ongoing assessment of swallow, including reevaluation of swallow clinically and/or including instrumental assessment of swallow if indicated, to further assess swallow function in anticipation of initiation of PO diet.  -AA      Time Frame (Swallow Long Term Goal) 1 week  -AA      Progress/Outcomes (Swallow Long Term Goal) new goal  -AA                User Key  (r) = Recorded By, (t) = Taken By, (c) = Cosigned By      Initials Name Provider Type    Delfina Brooks, SLP Speech and Language Pathologist    Joe Whiting, SLP Speech and Language Pathologist    Briana Soto SLP Speech and Language Pathologist                  BARON Pendleton  10/19/2024

## 2024-10-19 NOTE — PLAN OF CARE
Goal Outcome Evaluation:     Clinical swallow re-evaluation completed. Pt was awake and alert, able to follow commands, oriented x3. Able to verbalize. Per pt's chart he has made improvements this A.M. Upon room entry, pt was lying in bed awake. Room air.  SLP adjusted pt to sitting at 90 degrees for swallow evaluation. Pt was NPO at the time of evaluation. Adequate natural dentition. His prior facility reported a mechanical soft diet and thin liquids. No prior ST notes in chart from past. Oral motor exam revealed overall generalized oral motor weakness. Trials assessed: ice chips x1, thin by cup x2, thin by straw x2, puree x2. Pt had good bolus control of ice chip, all trials given via SLP. Adequate labial seal w/straw. No anterior loss. Oral transit appeared timely. No pocketing or residue. Pt demonstrated cough during each trial of thin liquids and puree. Wet vocal quality. Resp rate increased. No further trials given secondary to concern for aspiration. SLP provided recommendations to pt for VFSS at this time. Pt verbalized agreement. Nursing staff made aware. Per above, pt presents w/ MILD oral stage dysphagia and suspecting pharyngeal phase at this time. It is recommended pt remain NPO w/ FWP. VFSS pending. Meds via alternate route. ST will continue to follow for swallow re-evaluation.     SLP Plan & Recommendation:      - NPO w/ exception of FWP- see below   -VFSS pending  - Water/ice only when pt is fully awake and alert  - Meds: via alternate route  - Safe swallow strategies: HOB at a 90 degree angle, slow rate of intake, small sips  -Oral care at least x2 daily     The rationale to recommend water/ice chips when a PO diet cannot appropriately/functionally sustain nutrition is because water is low risk for aspiration pna when compared to aspiration of food or other liquids.       Benefits of a water protocol include but are not limited to:      Oral gratification   Engagement of oropharyngeal swallow  musculature   Decrease likelihood of dehydration       Guidelines for proper implementation include:  Thorough oral care prior to consuming water  Upright at 90 degree hip flexion  Small sips at slow rate     Monitor for any changes in respiratory status and discontinue if distress noted     The [place] Free Water Protocol is a research based protocol established in 1984.

## 2024-10-19 NOTE — PROGRESS NOTES
"  Chief complaint \"I'm ok\"    Subjective .     History of present illness:  The patient is a 69 y.o. male who was admitted secondary to altered mental status.  Past medical history significant for remote history of alcohol dependence, abdominal aortic aneurysm,  frequent falls, generalized weakness.  Psychiatric consult was requested by  secondary to psychosis.  The patient was very poor historian secondary to cognitive deficits, he knew his name, last name, knew his age, but not oriented to situation,  time or place.   Reportedly, in early October 2024 the patient was manic, he was found outside of the facility, had multiple falls.  When the patient is on his baseline he is alert and oriented x 1, he was observed hallucinating, in the past he was trying to run out of the facility.  The patient was started on Seroquel and Ativan on October 11, 2024, since then he started having episodes of lethargy, decreased appetite.      Initial assessment the patient knew his age and name.  He did not display any combative or aggressive behavior, but he experienced jerking movements, according to the sitter those movements, prominent when he is falling asleep.  The patient did not appear to be responding to internal stimuli.  Past psychiatric history: Alcohol dependence, mood disorder      Today   Pt is awake and alert   Oriented to self, and city  Disoriented to specific place, time and situation   No family at bedside   Sitter at bedside, denied agitation       The following portions of the patient's history were reviewed and updated as appropriate: allergies, current medications, past family history, past medical history, past social history, past surgical history and problem list.    History    Objective     Vital Signs   /90 (BP Location: Left arm, Patient Position: Lying)   Pulse 80   Temp 98.7 °F (37.1 °C) (Oral)   Resp 19   Ht 172.7 cm (68\")   Wt 67.6 kg (149 lb 0.5 oz)   SpO2 94%   BMI 22.66 kg/m² "     MENTAL STATUS EXAM   General Appearance:  Cleanly groomed and dressed  Eye Contact:  Good eye contact  Attitude:  Cooperative and polite  Motor Activity:  Normal gait, posture  Speech:  Minimal spontaneity  Mood and affect:  Normal, pleasant  Thought Process:  Goal-directed  Associations/ Thought Content:  No delusions  Hallucinations:  None  Suicidal Ideations:  Not present  Homicidal Ideation:  Not present  Sensorium:  Alert and confused  Orientation:  Person and place  Insight:  Limited  Judgement:  Fair  Reliability:  Poor and fair  Impulse Control:  Fair         Assessment & Plan       AMS (altered mental status)    Altered mental status       Assessment:  Dementia with behavioral disturbances     Treatment Plan:  the pt presented with new onset of physical weakness , reportedly he had cognitive deficits on his baseline (not new)  He was recently started on seroquel that could cause sedation and lethargy but sedation is dose dependent   Mirtazapine for mood d/o - dose was decreased with symptom improvement. Pt is more alert, responding appropriately. Pleasantly confused   No agitation or aggressive behavior   Cont mirtazapine 7.5 mg po QHS   Cont to provide support, will follow prn   Treatment Plan discussed with: Patient  I discussed the patients findings and my recommendations with patient and nursing staff  I have reviewed and approved the behavioral health treatment plans and problem list. Yes  Thank you for the consult   Referring MD has access to consult report and progress notes in EMR     Yesy Aleman DNP, APRN  10/19/24  14:02 EDT

## 2024-10-19 NOTE — NURSING NOTE
Upon assessment nurse noted pt. Lethargic and not arousing to name. Primary nurse checked pt. Blood glucose which resulted at 12. Blood glucose was rechecked and came back at 15. IV push D50W was given and blood glucose was rechecked at 1955 and came back at 99. Provider on call notified IV fluid D5 and NaCl 0.45 was ordered at 50mL/hr and Dietitian consult placed.

## 2024-10-20 ENCOUNTER — APPOINTMENT (OUTPATIENT)
Dept: GENERAL RADIOLOGY | Facility: HOSPITAL | Age: 69
End: 2024-10-20
Payer: MEDICARE

## 2024-10-20 ENCOUNTER — INPATIENT HOSPITAL (OUTPATIENT)
Dept: URBAN - METROPOLITAN AREA HOSPITAL 84 | Facility: HOSPITAL | Age: 69
End: 2024-10-20

## 2024-10-20 ENCOUNTER — INPATIENT HOSPITAL (OUTPATIENT)
Age: 69
End: 2024-10-20

## 2024-10-20 DIAGNOSIS — R13.10 DYSPHAGIA, UNSPECIFIED: ICD-10-CM

## 2024-10-20 LAB
ANION GAP SERPL CALCULATED.3IONS-SCNC: 10.4 MMOL/L (ref 5–15)
ANION GAP SERPL CALCULATED.3IONS-SCNC: 11.3 MMOL/L (ref 5–15)
BASOPHILS # BLD AUTO: 0.05 10*3/MM3 (ref 0–0.2)
BASOPHILS # BLD AUTO: 0.06 10*3/MM3 (ref 0–0.2)
BASOPHILS NFR BLD AUTO: 0.8 % (ref 0–1.5)
BASOPHILS NFR BLD AUTO: 0.9 % (ref 0–1.5)
BUN SERPL-MCNC: 6 MG/DL (ref 8–23)
BUN SERPL-MCNC: 9 MG/DL (ref 8–23)
BUN/CREAT SERPL: 12 (ref 7–25)
BUN/CREAT SERPL: 9.4 (ref 7–25)
CALCIUM SPEC-SCNC: 8.5 MG/DL (ref 8.6–10.5)
CALCIUM SPEC-SCNC: 8.9 MG/DL (ref 8.6–10.5)
CHLORIDE SERPL-SCNC: 111 MMOL/L (ref 98–107)
CHLORIDE SERPL-SCNC: 112 MMOL/L (ref 98–107)
CO2 SERPL-SCNC: 18.7 MMOL/L (ref 22–29)
CO2 SERPL-SCNC: 19.6 MMOL/L (ref 22–29)
CREAT SERPL-MCNC: 0.64 MG/DL (ref 0.76–1.27)
CREAT SERPL-MCNC: 0.75 MG/DL (ref 0.76–1.27)
DEPRECATED RDW RBC AUTO: 51.7 FL (ref 37–54)
DEPRECATED RDW RBC AUTO: 53.1 FL (ref 37–54)
EGFRCR SERPLBLD CKD-EPI 2021: 101.8 ML/MIN/1.73
EGFRCR SERPLBLD CKD-EPI 2021: 97.1 ML/MIN/1.73
EOSINOPHIL # BLD AUTO: 0.21 10*3/MM3 (ref 0–0.4)
EOSINOPHIL # BLD AUTO: 0.35 10*3/MM3 (ref 0–0.4)
EOSINOPHIL NFR BLD AUTO: 3.4 % (ref 0.3–6.2)
EOSINOPHIL NFR BLD AUTO: 5.2 % (ref 0.3–6.2)
ERYTHROCYTE [DISTWIDTH] IN BLOOD BY AUTOMATED COUNT: 15 % (ref 12.3–15.4)
ERYTHROCYTE [DISTWIDTH] IN BLOOD BY AUTOMATED COUNT: 15.1 % (ref 12.3–15.4)
GLUCOSE BLDC GLUCOMTR-MCNC: 103 MG/DL (ref 70–105)
GLUCOSE BLDC GLUCOMTR-MCNC: 107 MG/DL (ref 70–105)
GLUCOSE BLDC GLUCOMTR-MCNC: 118 MG/DL (ref 70–105)
GLUCOSE BLDC GLUCOMTR-MCNC: 160 MG/DL (ref 70–105)
GLUCOSE BLDC GLUCOMTR-MCNC: 41 MG/DL (ref 70–105)
GLUCOSE BLDC GLUCOMTR-MCNC: 77 MG/DL (ref 70–105)
GLUCOSE BLDC GLUCOMTR-MCNC: 80 MG/DL (ref 70–105)
GLUCOSE BLDC GLUCOMTR-MCNC: 85 MG/DL (ref 70–105)
GLUCOSE BLDC GLUCOMTR-MCNC: 87 MG/DL (ref 70–105)
GLUCOSE BLDC GLUCOMTR-MCNC: 88 MG/DL (ref 70–105)
GLUCOSE SERPL-MCNC: 115 MG/DL (ref 65–99)
GLUCOSE SERPL-MCNC: 97 MG/DL (ref 65–99)
HCT VFR BLD AUTO: 30.6 % (ref 37.5–51)
HCT VFR BLD AUTO: 33.3 % (ref 37.5–51)
HGB BLD-MCNC: 10.4 G/DL (ref 13–17.7)
HGB BLD-MCNC: 9.6 G/DL (ref 13–17.7)
IMM GRANULOCYTES # BLD AUTO: 0.03 10*3/MM3 (ref 0–0.05)
IMM GRANULOCYTES # BLD AUTO: 0.04 10*3/MM3 (ref 0–0.05)
IMM GRANULOCYTES NFR BLD AUTO: 0.4 % (ref 0–0.5)
IMM GRANULOCYTES NFR BLD AUTO: 0.6 % (ref 0–0.5)
LYMPHOCYTES # BLD AUTO: 0.94 10*3/MM3 (ref 0.7–3.1)
LYMPHOCYTES # BLD AUTO: 1.09 10*3/MM3 (ref 0.7–3.1)
LYMPHOCYTES NFR BLD AUTO: 13.9 % (ref 19.6–45.3)
LYMPHOCYTES NFR BLD AUTO: 17.4 % (ref 19.6–45.3)
MAGNESIUM SERPL-MCNC: 1.7 MG/DL (ref 1.6–2.4)
MCH RBC QN AUTO: 29.2 PG (ref 26.6–33)
MCH RBC QN AUTO: 29.4 PG (ref 26.6–33)
MCHC RBC AUTO-ENTMCNC: 31.2 G/DL (ref 31.5–35.7)
MCHC RBC AUTO-ENTMCNC: 31.4 G/DL (ref 31.5–35.7)
MCV RBC AUTO: 93 FL (ref 79–97)
MCV RBC AUTO: 94.1 FL (ref 79–97)
MONOCYTES # BLD AUTO: 0.63 10*3/MM3 (ref 0.1–0.9)
MONOCYTES # BLD AUTO: 0.77 10*3/MM3 (ref 0.1–0.9)
MONOCYTES NFR BLD AUTO: 12.3 % (ref 5–12)
MONOCYTES NFR BLD AUTO: 9.3 % (ref 5–12)
NEUTROPHILS NFR BLD AUTO: 4.09 10*3/MM3 (ref 1.7–7)
NEUTROPHILS NFR BLD AUTO: 4.73 10*3/MM3 (ref 1.7–7)
NEUTROPHILS NFR BLD AUTO: 65.5 % (ref 42.7–76)
NEUTROPHILS NFR BLD AUTO: 70.3 % (ref 42.7–76)
NRBC BLD AUTO-RTO: 0 /100 WBC (ref 0–0.2)
NRBC BLD AUTO-RTO: 0 /100 WBC (ref 0–0.2)
PHOSPHATE SERPL-MCNC: 2.2 MG/DL (ref 2.5–4.5)
PHOSPHATE SERPL-MCNC: 3 MG/DL (ref 2.5–4.5)
PLATELET # BLD AUTO: 228 10*3/MM3 (ref 140–450)
PLATELET # BLD AUTO: 259 10*3/MM3 (ref 140–450)
PMV BLD AUTO: 11.5 FL (ref 6–12)
PMV BLD AUTO: 11.9 FL (ref 6–12)
POTASSIUM SERPL-SCNC: 3.5 MMOL/L (ref 3.5–5.2)
POTASSIUM SERPL-SCNC: 4.2 MMOL/L (ref 3.5–5.2)
RBC # BLD AUTO: 3.29 10*6/MM3 (ref 4.14–5.8)
RBC # BLD AUTO: 3.54 10*6/MM3 (ref 4.14–5.8)
SODIUM SERPL-SCNC: 141 MMOL/L (ref 136–145)
SODIUM SERPL-SCNC: 142 MMOL/L (ref 136–145)
WBC NRBC COR # BLD AUTO: 6.25 10*3/MM3 (ref 3.4–10.8)
WBC NRBC COR # BLD AUTO: 6.74 10*3/MM3 (ref 3.4–10.8)

## 2024-10-20 PROCEDURE — 84100 ASSAY OF PHOSPHORUS: CPT | Performed by: STUDENT IN AN ORGANIZED HEALTH CARE EDUCATION/TRAINING PROGRAM

## 2024-10-20 PROCEDURE — 85025 COMPLETE CBC W/AUTO DIFF WBC: CPT | Performed by: NURSE PRACTITIONER

## 2024-10-20 PROCEDURE — 25010000002 OLANZAPINE 10 MG RECONSTITUTED SOLUTION 1 EACH VIAL

## 2024-10-20 PROCEDURE — 83735 ASSAY OF MAGNESIUM: CPT | Performed by: NURSE PRACTITIONER

## 2024-10-20 PROCEDURE — 82948 REAGENT STRIP/BLOOD GLUCOSE: CPT

## 2024-10-20 PROCEDURE — 99222 1ST HOSP IP/OBS MODERATE 55: CPT | Performed by: NURSE PRACTITIONER

## 2024-10-20 PROCEDURE — 74018 RADEX ABDOMEN 1 VIEW: CPT

## 2024-10-20 PROCEDURE — 82948 REAGENT STRIP/BLOOD GLUCOSE: CPT | Performed by: STUDENT IN AN ORGANIZED HEALTH CARE EDUCATION/TRAINING PROGRAM

## 2024-10-20 PROCEDURE — 25010000002 LORAZEPAM PER 2 MG: Performed by: STUDENT IN AN ORGANIZED HEALTH CARE EDUCATION/TRAINING PROGRAM

## 2024-10-20 PROCEDURE — 80048 BASIC METABOLIC PNL TOTAL CA: CPT | Performed by: NURSE PRACTITIONER

## 2024-10-20 PROCEDURE — 85025 COMPLETE CBC W/AUTO DIFF WBC: CPT | Performed by: STUDENT IN AN ORGANIZED HEALTH CARE EDUCATION/TRAINING PROGRAM

## 2024-10-20 PROCEDURE — 71045 X-RAY EXAM CHEST 1 VIEW: CPT

## 2024-10-20 PROCEDURE — 25810000003 DEXTROSE 5 % AND SODIUM CHLORIDE 0.9 % 5-0.9 % SOLUTION: Performed by: NURSE PRACTITIONER

## 2024-10-20 PROCEDURE — 25010000002 CEFTRIAXONE PER 250 MG: Performed by: STUDENT IN AN ORGANIZED HEALTH CARE EDUCATION/TRAINING PROGRAM

## 2024-10-20 PROCEDURE — 25010000002 HYDRALAZINE PER 20 MG: Performed by: INTERNAL MEDICINE

## 2024-10-20 PROCEDURE — 25810000003 SODIUM CHLORIDE 0.9 % SOLUTION: Performed by: STUDENT IN AN ORGANIZED HEALTH CARE EDUCATION/TRAINING PROGRAM

## 2024-10-20 PROCEDURE — 80048 BASIC METABOLIC PNL TOTAL CA: CPT | Performed by: STUDENT IN AN ORGANIZED HEALTH CARE EDUCATION/TRAINING PROGRAM

## 2024-10-20 RX ORDER — POTASSIUM CHLORIDE 7.45 MG/ML
10 INJECTION INTRAVENOUS
Status: COMPLETED | OUTPATIENT
Start: 2024-10-21 | End: 2024-10-21

## 2024-10-20 RX ORDER — DEXTROSE MONOHYDRATE AND SODIUM CHLORIDE 5; .9 G/100ML; G/100ML
75 INJECTION, SOLUTION INTRAVENOUS CONTINUOUS
Status: DISCONTINUED | OUTPATIENT
Start: 2024-10-20 | End: 2024-10-20

## 2024-10-20 RX ORDER — DEXTROSE MONOHYDRATE 100 MG/ML
50 INJECTION, SOLUTION INTRAVENOUS CONTINUOUS
Status: DISCONTINUED | OUTPATIENT
Start: 2024-10-20 | End: 2024-10-21

## 2024-10-20 RX ORDER — OLANZAPINE 10 MG/2ML
10 INJECTION, POWDER, LYOPHILIZED, FOR SOLUTION INTRAMUSCULAR ONCE
Status: COMPLETED | OUTPATIENT
Start: 2024-10-20 | End: 2024-10-20

## 2024-10-20 RX ORDER — LORAZEPAM 2 MG/ML
1 INJECTION INTRAMUSCULAR ONCE
Status: COMPLETED | OUTPATIENT
Start: 2024-10-20 | End: 2024-10-20

## 2024-10-20 RX ADMIN — SODIUM PHOSPHATE, MONOBASIC, MONOHYDRATE AND SODIUM PHOSPHATE, DIBASIC, ANHYDROUS 15 MMOL: 276; 142 INJECTION, SOLUTION INTRAVENOUS at 14:20

## 2024-10-20 RX ADMIN — OLANZAPINE 10 MG: 10 INJECTION, POWDER, FOR SOLUTION INTRAMUSCULAR at 19:59

## 2024-10-20 RX ADMIN — LORAZEPAM 1 MG: 2 INJECTION INTRAMUSCULAR; INTRAVENOUS at 17:19

## 2024-10-20 RX ADMIN — DEXTROSE MONOHYDRATE 50 ML/HR: 100 INJECTION, SOLUTION INTRAVENOUS at 19:32

## 2024-10-20 RX ADMIN — POTASSIUM CHLORIDE 40 MEQ: 1.5 POWDER, FOR SOLUTION ORAL at 00:03

## 2024-10-20 RX ADMIN — DEXTROSE AND SODIUM CHLORIDE 75 ML/HR: 5; 900 INJECTION, SOLUTION INTRAVENOUS at 08:08

## 2024-10-20 RX ADMIN — DEXTROSE MONOHYDRATE 25 G: 25 INJECTION, SOLUTION INTRAVENOUS at 19:14

## 2024-10-20 RX ADMIN — CEFTRIAXONE 1000 MG: 1 INJECTION, POWDER, FOR SOLUTION INTRAMUSCULAR; INTRAVENOUS at 10:14

## 2024-10-20 RX ADMIN — AMANTADINE HYDROCHLORIDE 100 MG: 100 CAPSULE ORAL at 10:15

## 2024-10-20 RX ADMIN — HYDRALAZINE HYDROCHLORIDE 10 MG: 20 INJECTION INTRAMUSCULAR; INTRAVENOUS at 18:05

## 2024-10-20 NOTE — PROGRESS NOTES
[facility] MEDICINE SERVICE  DAILY PROGRESS NOTE    NAME: Shahram Chaney  : 1955  MRN: [id number]      LOS: 3 days     PROVIDER OF SERVICE: Jose Roberto Mahmood MD    Chief Complaint: AMS (altered mental status)    Subjective:     Interval History:  History taken from: patient    Continues to improve and actually able to have a conversation with me today.        Review of Systems:   Review of Systems    Objective:     Vital Signs  Temp:  [97.4 °F (36.3 °C)-98.6 °F (37 °C)] 97.9 °F (36.6 °C)  Heart Rate:  [67-82] 80  Resp:  [14-19] 19  BP: (134-165)/() 165/105   Body mass index is 24.07 kg/m².    Physical Exam  Physical Exam  Constitutional:       Comments: Chronically ill appearing elderly gentleman   HENT:      Head: Normocephalic and atraumatic.   Cardiovascular:      Rate and Rhythm: Normal rate and regular rhythm.      Pulses: Normal pulses.      Heart sounds: Normal heart sounds.   Pulmonary:      Effort: Pulmonary effort is normal.      Breath sounds: Normal breath sounds.   Abdominal:      General: Abdomen is flat.      Palpations: Abdomen is soft.   Neurological:      General: No focal deficit present.      Mental Status: He is alert.      Comments: Unable to comply with neurological exam.  No meningeal signs.  Normal tone.  No spasticity.            Diagnostic Data    Results from last 7 days   Lab Units 10/20/24  0827 10/17/24  0416 10/16/24  1103   WBC 10*3/mm3 6.74   < > 8.39   HEMOGLOBIN g/dL 10.4*   < > 9.2*   HEMATOCRIT % 33.3*   < > 29.9*   PLATELETS 10*3/mm3 259   < > 226   GLUCOSE mg/dL 115*   < > 68   CREATININE mg/dL 0.75*   < > 1.14   BUN mg/dL 9   < > 10   SODIUM mmol/L 141   < > 147*   POTASSIUM mmol/L 4.2   < > 4.1   AST (SGOT) U/L  --   --  24   ALT (SGPT) U/L  --   --  18   ALK PHOS U/L  --   --  289*   BILIRUBIN mg/dL  --   --  1.1   ANION GAP mmol/L 11.3   < > 12.2    < > = values in this interval not displayed.       XR Abdomen KUB    Result Date: 10/20/2024  Impression:  Feeding tube tip is in the stomach. Electronically Signed: Aron Santos MD  10/20/2024 3:05 AM EDT  Workstation ID: [id number]    XR Abdomen KUB    Result Date: 10/19/2024  Impression: Esophagogastric tube tip overlies the proximal stomach. Electronically Signed: Lico Mills MD  10/19/2024 5:49 PM EDT  Workstation ID: [id number]       I reviewed the patient's new clinical results.    Assessment/Plan:     Active and Resolved Problems  Active Hospital Problems    Diagnosis  POA    **AMS (altered mental status) [R41.82]  Yes    Altered mental status [R41.82]  Yes      Resolved Hospital Problems   No resolved problems to display.       Addendum: New pneumothorax called for patient on CXR. Patient is currently stable on 2L NC, no respiratory distress, normotensive. Will place on 100% NRB and repeat CXR around midnight. Discussed possible chest tube placement with ICU but pneumo is small and patient is stable, will hold off for now. Pulm consulted.        # Delirium  Acute urinary tract infection  Aspiration pneumonitis?  -pt is sent to ED for physical weakness jerky movement and lethargic decreased intake ( he is AAO 1 baseline )   -Seems to be a more acute finding on top of his normal baseline and is occurring in the setting of what seems like a new UTI and possible aspiration pneumonitis.  -Treat the new urinalysis UTI with ceftriaxone.  Speech to evaluate patient.  -He is improving today.  He is improving day over day.  He was awake and alert enough to shake my hand and have a coherent conversation today.  Hopeful that when his mental status improves more tomorrow he will be able to have a repeat VFSS and have the NG tube removed.  I do not anticipate the patient will actually need a PEG tube.        #Reported aspiration   -SLP to see   -Diet as appropriate  -Hypoglycemia management    VTE Prophylaxis:  Mechanical VTE prophylaxis orders are present.             Disposition Planning:     Barriers to Discharge: Mental  status, workup  Anticipated Date of Discharge: 10/22/2024  Place of Discharge: Return to facility      Time: 50 minutes     There are no questions and answers to display.       Signature: Electronically signed by Jose Roberto Mahmood MD, 10/20/24, 12:13 EDT.  [facility] Hospitalist Team

## 2024-10-20 NOTE — CONSULTS
"GI CONSULT  NOTE:    Referring Provider:    Dr Mahmood      Chief complaint:  Very tight cricopharyngeal muscles    Subjective   Altered mental status    History of present illness:     Patient is 69-year-old male with a history of dementia, alcoholism who was brought in to the ER from [facility] unit for multiple unwitnessed falls.  GI was consulted today due to report from speech therapy that patient has very tight cricopharyngeal muscles.  Has failed swallowing study and has had 3 NG tube all of which patient removed.  Nursing staff called memory care unit and he has only been there for about 1 week he tends to eat about 50% of his meal.  No family is present at bedside.    Endo History:  Nothing in G med    Past Medical History:  History reviewed. No pertinent past medical history.    Past Surgical History:  History reviewed. No pertinent surgical history.    Social History:  Social History     Tobacco Use    Smoking status: Unknown   Vaping Use    Vaping status: Never Used   Substance Use Topics    Alcohol use: Not Currently     Comment: not for several months    Drug use: Yes     Types: \"Crack\" cocaine, Cocaine(coke)       Family History:  History reviewed. No pertinent family history.    Medications:  Medications Prior to Admission   Medication Sig Dispense Refill Last Dose/Taking    acetaminophen (Tylenol) 325 MG tablet Take 2 tablets by mouth Every 6 (Six) Hours As Needed for Mild Pain or Moderate Pain.   Past Month    amantadine (SYMMETREL) 100 MG tablet Take 1 tablet by mouth Every 12 (Twelve) Hours.   10/16/2024    atorvastatin (LIPITOR) 20 MG tablet Take 1 tablet by mouth Every Night.   10/16/2024    hydrALAZINE (APRESOLINE) 25 MG tablet Take 1 tablet by mouth Every 6 (Six) Hours As Needed (HTN).   Past Week    loperamide (IMODIUM A-D) 2 MG tablet Take 1 tablet by mouth Every 6 (Six) Hours As Needed for Diarrhea.   Past Month    LORazepam (Ativan) 1 MG tablet Take 1 tablet by mouth Every " 6 (Six) Hours As Needed for Anxiety.   10/16/2024    losartan (COZAAR) 50 MG tablet Take 1 tablet by mouth Every 12 (Twelve) Hours.   10/16/2024    Melatonin 5 MG capsule Take 1 capsule by mouth Daily.   10/16/2024    mirtazapine (REMERON) 7.5 MG tablet Take 1 tablet by mouth Every Night.   Past Week    pantoprazole (PROTONIX) 40 MG EC tablet Take 1 tablet by mouth Daily.   10/16/2024    spironolactone (ALDACTONE) 25 MG tablet Take 0.5 tablets by mouth Daily.   10/16/2024    aspirin 81 MG chewable tablet Chew 1 tablet Daily.          Scheduled Meds:amantadine, 100 mg, Nasogastric, Q12H  Barium Sulfate, 1 teaspoon(s), Oral, Once in imaging  barium sulfate, 50 mL, Oral, Once in imaging  barium sulfate, 50 mL, Oral, Once in imaging  cefTRIAXone, 1,000 mg, Intravenous, Q24H  mirtazapine, 7.5 mg, Nasogastric, Nightly  sodium phosphate, 15 mmol, Intravenous, Once      Continuous Infusions:dextrose 5 % and sodium chloride 0.9 %, 75 mL/hr, Last Rate: 75 mL/hr (10/20/24 0808)      PRN Meds:.  acetaminophen **OR** acetaminophen **OR** acetaminophen    aluminum-magnesium hydroxide-simethicone    senna-docusate sodium **AND** polyethylene glycol **AND** bisacodyl **AND** bisacodyl    Calcium Replacement - Follow Nurse / BPA Driven Protocol    dextrose    dextrose    glucagon (human recombinant)    hydrALAZINE    Magnesium Standard Dose Replacement - Follow Nurse / BPA Driven Protocol    melatonin    nitroglycerin    ondansetron ODT **OR** ondansetron    Phosphorus Replacement - Follow Nurse / BPA Driven Protocol    Potassium Replacement - Follow Nurse / BPA Driven Protocol    sodium chloride    ALLERGIES:  Patient has no known allergies.    ROS:  Review of Systems   Unable to perform ROS: Mental status change          Objective  Restless in bed. NAD. Rm 243. Upper soft restraints noted. Video surveillance machine in room.     Vital Signs:   Vitals:    10/19/24 2109 10/20/24 0010 10/20/24 0839 10/20/24 1125   BP:  145/98 145/97  (!) 165/105   BP Location:  Right arm Right arm Right arm   Patient Position:  Lying Lying Lying   Pulse: 77 67 82 80   Resp:  14 16 19   Temp:  97.4 °F (36.3 °C) 98.6 °F (37 °C) 97.9 °F (36.6 °C)   TempSrc:  Axillary Oral Oral   SpO2: (!) 0% 99% 100% 100%   Weight:  71.8 kg (158 lb 4.6 oz)     Height:           Physical Exam:    General Appearance:    Awake and alert, in no acute distress   Head:    Normocephalic, without obvious abnormality, atraumatic   Eyes:            Conjunctivae normal, anicteric sclerae, pupils equal   Ears:    Ears appear intact with no abnormalities noted   Throat:   No oral lesions, no thrush, oral mucosa moist   Neck:   Supple, no JVD   Lungs:      respirations regular, even and unlabored        Chest Wall:    No abnormalities observed   Abdomen:      soft, nontender, no rebound or guarding, nondistended, no hepatosplenomegaly   Rectal:     Deferred   Extremities:   Moves all extremities, no edema, no cyanosis. Multiple scabs noted on extremities   Pulses:   Pulses palpable and equal bilaterally   Skin:   No rash, no jaundice, normal palpation          Results Review:   I reviewed the patient's labs and imaging.  CBC    Results from last 7 days   Lab Units 10/20/24  0827 10/19/24  0217 10/18/24  0235 10/17/24  0416 10/16/24  1103   WBC 10*3/mm3 6.74 9.29 12.09* 10.96* 8.39   HEMOGLOBIN g/dL 10.4* 10.6* 9.4* 9.0* 9.2*   PLATELETS 10*3/mm3 259 248 224 236 226     CMP   Results from last 7 days   Lab Units 10/20/24  0827 10/19/24  1451 10/19/24  0217 10/18/24  0235 10/17/24  0416 10/16/24  1103   SODIUM mmol/L 141  --  145 142 146* 147*   POTASSIUM mmol/L 4.2 3.5 3.0* 3.8 3.9 4.1   CHLORIDE mmol/L 111*  --  111* 108* 110* 109*   CO2 mmol/L 18.7*  --  24.1 24.2 22.6 25.8   BUN mg/dL 9  --  10 10 11 10   CREATININE mg/dL 0.75*  --  0.76 0.86 1.00 1.14   GLUCOSE mg/dL 115*  --  164* 98 94 68   ALBUMIN g/dL  --   --   --   --   --  3.2*   BILIRUBIN mg/dL  --   --   --   --   --  1.1   ALK PHOS  "U/L  --   --   --   --   --  289*   AST (SGOT) U/L  --   --   --   --   --  24   ALT (SGPT) U/L  --   --   --   --   --  18   MAGNESIUM mg/dL 1.7  --  2.0 1.7 1.8 1.8   PHOSPHORUS mg/dL 2.2* 2.2* 2.2* 2.3* 2.4*  --    AMMONIA umol/L  --   --   --   --   --  34     Cr Clearance Estimated Creatinine Clearance: 93.1 mL/min (A) (by C-G formula based on SCr of 0.75 mg/dL (L)).  Coag     HbA1C No results found for: \"HGBA1C\"  Blood Glucose   Glucose   Date/Time Value Ref Range Status   10/20/2024 1238 103 70 - 105 mg/dL Final     Comment:     Serial Number: [id number]:  [id number]   10/20/2024 1234 107 (H) 70 - 105 mg/dL Final     Comment:     Serial Number: [id number]:  [id number]   10/20/2024 0807 87 70 - 105 mg/dL Final     Comment:     Serial Number: [id number]:  [id number]   10/20/2024 0609 88 70 - 105 mg/dL Final     Comment:     Serial Number: [id number]:  [id number]   10/20/2024 0007 118 (H) 70 - 105 mg/dL Final     Comment:     Serial Number: [id number]:  [id number]   10/19/2024 2134 108 (H) 70 - 105 mg/dL Final     Comment:     Serial Number: [id number]:  [id number]   10/19/2024 1941 142 (H) 70 - 105 mg/dL Final     Comment:     Serial Number: [id number]:  [id number]   10/19/2024 1916 127 (H) 70 - 105 mg/dL Final     Comment:     Serial Number: [id number]:  [id number]     Infection   Results from last 7 days   Lab Units 10/19/24  0716 10/16/24  1103   BLOODCX   --  No growth at 4 days  No growth at 4 days   URINECX  >100,000 CFU/mL Gram Negative Bacilli*  --      UA    Results from last 7 days   Lab Units 10/19/24  0716   NITRITE UA  Positive*   WBC UA /HPF 21-50*   BACTERIA UA /HPF 4+*   SQUAM EPITHEL UA /HPF 0-2   URINECX  >100,000 CFU/mL Gram Negative Bacilli*     Radiology(recent) XR Abdomen KUB    Result Date: 10/20/2024  Impression: Feeding tube tip is in the stomach. Electronically Signed: Aron Santos MD  10/20/2024 3:05 AM EDT  Workstation ID: " [id number]    XR Abdomen KUB    Result Date: 10/19/2024  Impression: Esophagogastric tube tip overlies the proximal stomach. Electronically Signed: Lico Mills MD  10/19/2024 5:49 PM EDT  Workstation ID: [id number]       ASSESSMENT:  Dysphagia reported to be related to tight cricopharyngeal muscles   Altered mental status consider baseline dementia plus UTI  UTI   Normocytic anemia     PLAN:  Replace NGT tube, restrain as needed   Continue to treat underlying conditions that are causing his delirium.   Consider EGD with dilation if swallowing due to improve as infection improves.   Goals of care with brother.        I discussed the patient's findings and my recommendations with the patient.  NANCY Stinson  10/20/24  13:13 EDT    Time:

## 2024-10-20 NOTE — CASE MANAGEMENT/SOCIAL WORK
Continued Stay Note  MARTI Shaver     Patient Name: Shahram Chaney  MRN: [id number]  Today's Date: 10/20/2024    Admit Date: 10/16/2024    Plan: D/C Plan: Return to [facility] unit.  No new PASRR needed; No pre-cert. Transport TBD   Discharge Plan       Row Name 10/20/24 1613       Plan    Plan Comments remote sitter at bedside                    Expected Discharge Date and Time       Expected Discharge Date Expected Discharge Time    Oct 20, 2024               KECAI Emery RN  Case Management  [phone] office  [phone] fax

## 2024-10-20 NOTE — PROGRESS NOTES
Nutrition Services    Patient Name: Shahram Chaney  YOB: 1955  MRN: [id number]  Admission date: 10/16/2024    PROGRESS NOTE      Encounter Information: 10/20: Progress note to check on TF. TF was initiated yesterday but pt then pulled out his access. Per note from GI service today, they feel that when mental status improves further (it has improved somewhat already), that he may be able to take PO. Swallowing challenge is attributed to tight cricopharyngeal muscles. Likely will receive EGD with dilation as clinical status improves. Per GI note, plan includes replacing NG tube for now, but this has not yet been placed.       PO Diet: NPO Diet NPO Type: Tube Feeding  NPO Diet NPO Type: Strict NPO   PO Supplements: None ordered    PO Intake:  NPO       Current nutrition support: Has orders for Isosource 1.5 @ 30ml/hour with 30mL/hour water flush    Nutrition support review: Was tolerating while infusing; lacking access currently        Labs (reviewed below): Hypophosphatemia - replaced today IV        GI Function:  Stool Output  Stool Unmeasured Occurrence: 0 (10/20/24 1558)  Stool Consistency: loose (10/18/24 2000)  Perineal Care: absorbent brief/pad changed, perineum cleansed, skin sealant/barrier applied (10/19/24 2136)          Nutrition Intervention Updates: When access obtained, resume Isosource 1.5 @ 30mL/hour with 30ml/hour water flush        Results from last 7 days   Lab Units 10/20/24  0827 10/19/24  1451 10/19/24  0217 10/18/24  0235 10/17/24  0416 10/16/24  1103   SODIUM mmol/L 141  --  145 142   < > 147*   POTASSIUM mmol/L 4.2 3.5 3.0* 3.8   < > 4.1   CHLORIDE mmol/L 111*  --  111* 108*   < > 109*   CO2 mmol/L 18.7*  --  24.1 24.2   < > 25.8   BUN mg/dL 9  --  10 10   < > 10   CREATININE mg/dL 0.75*  --  0.76 0.86   < > 1.14   CALCIUM mg/dL 8.9  --  9.1 8.6   < > 8.8   BILIRUBIN mg/dL  --   --   --   --   --  1.1   ALK PHOS U/L  --   --   --   --   --  289*   ALT (SGPT) U/L  --   --   --   --    "--  18   AST (SGOT) U/L  --   --   --   --   --  24   GLUCOSE mg/dL 115*  --  164* 98   < > 68    < > = values in this interval not displayed.     Results from last 7 days   Lab Units 10/20/24  0827 10/19/24  1451 10/19/24  0217 10/18/24  0235   MAGNESIUM mg/dL 1.7  --  2.0 1.7   PHOSPHORUS mg/dL 2.2*   < > 2.2* 2.3*   HEMOGLOBIN g/dL 10.4*  --  10.6* 9.4*   HEMATOCRIT % 33.3*  --  34.0* 29.9*    < > = values in this interval not displayed.     COVID19   Date Value Ref Range Status   10/16/2024 Not Detected Not Detected - Ref. Range Final     No results found for: \"HGBA1C\"    RD to follow up per protocol.    Electronically signed by:  Helena Andres, [facility]ION  10/20/24 16:00 EDT    "

## 2024-10-20 NOTE — PLAN OF CARE
Problem: Adult Inpatient Plan of Care  Goal: Plan of Care Review  Outcome: Progressing  Goal: Patient-Specific Goal (Individualized)  Outcome: Progressing  Goal: Absence of Hospital-Acquired Illness or Injury  Outcome: Progressing  Intervention: Identify and Manage Fall Risk  Recent Flowsheet Documentation  Taken 10/20/2024 0034 by Xiomy Bear LPN  Safety Promotion/Fall Prevention:   assistive device/personal items within reach   activity supervised  Taken 10/19/2024 2200 by Xiomy Bear LPN  Safety Promotion/Fall Prevention: safety round/check completed  Taken 10/19/2024 2136 by Xiomy Bear LPN  Safety Promotion/Fall Prevention:   activity supervised   assistive device/personal items within reach  Intervention: Prevent Skin Injury  Recent Flowsheet Documentation  Taken 10/19/2024 2136 by Xiomy Bear LPN  Body Position: sitting up in bed  Skin Protection: transparent dressing maintained  Intervention: Prevent and Manage VTE (Venous Thromboembolism) Risk  Recent Flowsheet Documentation  Taken 10/19/2024 2136 by Xiomy Bear LPN  VTE Prevention/Management:   bilateral   SCDs (sequential compression devices) off   patient refused intervention  Intervention: Prevent Infection  Recent Flowsheet Documentation  Taken 10/20/2024 0034 by Xiomy Bear LPN  Infection Prevention:   environmental surveillance performed   hand hygiene promoted   equipment surfaces disinfected  Taken 10/19/2024 2200 by Xiomy Bear LPN  Infection Prevention:   equipment surfaces disinfected   environmental surveillance performed  Taken 10/19/2024 2136 by Xiomy Bear LPN  Infection Prevention: environmental surveillance performed  Goal: Optimal Comfort and Wellbeing  Outcome: Progressing  Intervention: Provide Person-Centered Care  Recent Flowsheet Documentation  Taken 10/19/2024 2136 by Xiomy Bear LPN  Trust Relationship/Rapport:   thoughts/feelings acknowledged   questions answered   emotional support  provided   care explained  Goal: Readiness for Transition of Care  Outcome: Progressing     Problem: Fall Injury Risk  Goal: Absence of Fall and Fall-Related Injury  Outcome: Progressing  Intervention: Identify and Manage Contributors  Recent Flowsheet Documentation  Taken 10/20/2024 0034 by Xiomy Bear LPN  Medication Review/Management: medications reviewed  Taken 10/19/2024 2200 by Xiomy Bear LPN  Medication Review/Management: medications reviewed  Taken 10/19/2024 2136 by Xiomy Bear LPN  Medication Review/Management: medications reviewed  Intervention: Promote Injury-Free Environment  Recent Flowsheet Documentation  Taken 10/20/2024 0034 by Xiomy Bear LPN  Safety Promotion/Fall Prevention:   assistive device/personal items within reach   activity supervised  Taken 10/19/2024 2200 by Xiomy Bear LPN  Safety Promotion/Fall Prevention: safety round/check completed  Taken 10/19/2024 2136 by Xiomy Bear LPN  Safety Promotion/Fall Prevention:   activity supervised   assistive device/personal items within reach     Problem: Skin Injury Risk Increased  Goal: Skin Health and Integrity  Outcome: Progressing  Intervention: Optimize Skin Protection  Recent Flowsheet Documentation  Taken 10/20/2024 0034 by Xiomy Bear LPN  Activity Management: activity minimized  Taken 10/19/2024 2136 by Xiomy Bear LPN  Activity Management: activity minimized  Pressure Reduction Techniques: frequent weight shift encouraged  Head of Bed (HOB) Positioning:   HOB elevated   HOB at 60-90 degrees  Pressure Reduction Devices: pressure-redistributing mattress utilized  Skin Protection: transparent dressing maintained     Problem: Confusion Acute  Goal: Optimal Cognitive Function  Outcome: Progressing     Problem: Seizure, Active Management  Goal: Absence of Seizure/Seizure-Related Injury  Outcome: Progressing     Problem: Malnutrition  Goal: Improved Nutritional Intake  Outcome: Progressing     Problem:  Electrolyte Imbalance  Goal: Electrolyte Balance  Outcome: Progressing  Intervention: Monitor and Manage Electrolyte Imbalance  Recent Flowsheet Documentation  Taken 10/19/2024 2136 by Xiomy Bear LPN  Fluid/Electrolyte Management: fluids provided     Problem: Hospitalized Older Adult  Goal: Optimal Cognitive Function  Outcome: Progressing  Goal: Effective Bowel Elimination  Outcome: Progressing  Goal: Optimal Coping  Outcome: Progressing  Intervention: Promote Psychosocial Wellbeing  Recent Flowsheet Documentation  Taken 10/19/2024 2136 by Xiomy Bear LPN  Family/Support System Care: self-care encouraged  Goal: Fluid and Electrolyte Balance  Outcome: Progressing  Intervention: Monitor and Manage Fluid and Electrolyte Balance  Recent Flowsheet Documentation  Taken 10/19/2024 2136 by Xiomy Bear LPN  Fluid/Electrolyte Management: fluids provided  Goal: Optimal Functional Ability  Outcome: Progressing  Intervention: Promote Functional Ability  Recent Flowsheet Documentation  Taken 10/20/2024 0034 by Xiomy Bear LPN  Activity Management: activity minimized  Activity Assistance Provided: assistance, 2 people  Taken 10/19/2024 2136 by Xiomy Bear LPN  Activity Management: activity minimized  Activity Assistance Provided: assistance, 2 people  Goal: Improved Oral Intake  Outcome: Progressing  Goal: Adequate Sleep/Rest  Outcome: Progressing  Goal: Effective Urinary Elimination  Outcome: Progressing  Intervention: Promote Urinary Continence  Recent Flowsheet Documentation  Taken 10/19/2024 2136 by Xiomy Bear LPN  Perineal Care:   absorbent brief/pad changed   perineum cleansed   skin sealant/barrier applied     Problem: Wound  Goal: Optimal Coping  Outcome: Progressing  Intervention: Support Patient and Family Response  Recent Flowsheet Documentation  Taken 10/19/2024 2136 by Xiomy Bear LPN  Family/Support System Care: self-care encouraged  Goal: Optimal Functional Ability  Outcome:  Progressing  Intervention: Optimize Functional Ability  Recent Flowsheet Documentation  Taken 10/20/2024 0034 by Xiomy Bear LPN  Activity Management: activity minimized  Activity Assistance Provided: assistance, 2 people  Taken 10/19/2024 2136 by Xiomy Bear LPN  Activity Management: activity minimized  Activity Assistance Provided: assistance, 2 people  Goal: Absence of Infection Signs and Symptoms  Outcome: Progressing  Intervention: Prevent or Manage Infection  Recent Flowsheet Documentation  Taken 10/20/2024 0034 by Xiomy Bear LPN  Isolation Precautions: precautions maintained  Taken 10/19/2024 2200 by Xiomy Bear LPN  Isolation Precautions: precautions maintained  Taken 10/19/2024 2136 by Xiomy Bear LPN  Isolation Precautions: precautions maintained  Goal: Improved Oral Intake  Outcome: Progressing  Goal: Optimal Pain Control and Function  Outcome: Progressing  Goal: Skin Health and Integrity  Outcome: Progressing  Intervention: Optimize Skin Protection  Recent Flowsheet Documentation  Taken 10/20/2024 0034 by Xiomy Bear LPN  Activity Management: activity minimized  Taken 10/19/2024 2136 by Xiomy Bear LPN  Activity Management: activity minimized  Pressure Reduction Techniques: frequent weight shift encouraged  Head of Bed (HOB) Positioning:   HOB elevated   HOB at 60-90 degrees  Pressure Reduction Devices: pressure-redistributing mattress utilized  Goal: Optimal Wound Healing  Outcome: Progressing     Problem: Glycemic Control Impaired  Goal: Blood Glucose Level Within Target Range  Outcome: Progressing  Goal: Minimize Hypoglycemia Risk  Outcome: Progressing  Intervention: Minimize and Manage Hypoglycemia  Recent Flowsheet Documentation  Taken 10/19/2024 2136 by Xiomy Bear LPN  Hypoglycemia Management:   blood glucose monitored   IV dextrose initiated   Goal Outcome Evaluation:

## 2024-10-21 ENCOUNTER — APPOINTMENT (OUTPATIENT)
Dept: GENERAL RADIOLOGY | Facility: HOSPITAL | Age: 69
End: 2024-10-21
Payer: MEDICARE

## 2024-10-21 ENCOUNTER — INPATIENT HOSPITAL (OUTPATIENT)
Age: 69
End: 2024-10-21

## 2024-10-21 ENCOUNTER — INPATIENT HOSPITAL (OUTPATIENT)
Dept: URBAN - METROPOLITAN AREA HOSPITAL 84 | Facility: HOSPITAL | Age: 69
End: 2024-10-21

## 2024-10-21 DIAGNOSIS — F03.90 UNSPECIFIED DEMENTIA, UNSPECIFIED SEVERITY, WITHOUT BEHAVIOR: ICD-10-CM

## 2024-10-21 DIAGNOSIS — K64.9 UNSPECIFIED HEMORRHOIDS: ICD-10-CM

## 2024-10-21 DIAGNOSIS — R13.10 DYSPHAGIA, UNSPECIFIED: ICD-10-CM

## 2024-10-21 DIAGNOSIS — R74.8 ABNORMAL LEVELS OF OTHER SERUM ENZYMES: ICD-10-CM

## 2024-10-21 PROBLEM — E44.0 MODERATE MALNUTRITION: Status: ACTIVE | Noted: 2024-10-21

## 2024-10-21 LAB
BACTERIA SPEC AEROBE CULT: ABNORMAL
BACTERIA SPEC AEROBE CULT: NORMAL
BACTERIA SPEC AEROBE CULT: NORMAL
GGT SERPL-CCNC: 118 U/L (ref 8–61)
GLUCOSE BLDC GLUCOMTR-MCNC: 100 MG/DL (ref 70–105)
GLUCOSE BLDC GLUCOMTR-MCNC: 101 MG/DL (ref 70–105)
GLUCOSE BLDC GLUCOMTR-MCNC: 81 MG/DL (ref 70–105)
GLUCOSE BLDC GLUCOMTR-MCNC: 88 MG/DL (ref 70–105)
GLUCOSE BLDC GLUCOMTR-MCNC: 95 MG/DL (ref 70–105)
POTASSIUM SERPL-SCNC: 3.8 MMOL/L (ref 3.5–5.2)
VIT B12 BLD-MCNC: 764 PG/ML (ref 211–946)

## 2024-10-21 PROCEDURE — 71045 X-RAY EXAM CHEST 1 VIEW: CPT

## 2024-10-21 PROCEDURE — 25010000002 HYDRALAZINE PER 20 MG: Performed by: INTERNAL MEDICINE

## 2024-10-21 PROCEDURE — 82977 ASSAY OF GGT: CPT | Performed by: NURSE PRACTITIONER

## 2024-10-21 PROCEDURE — 99232 SBSQ HOSP IP/OBS MODERATE 35: CPT | Performed by: NURSE PRACTITIONER

## 2024-10-21 PROCEDURE — 82948 REAGENT STRIP/BLOOD GLUCOSE: CPT

## 2024-10-21 PROCEDURE — 25010000002 CEFTRIAXONE PER 250 MG: Performed by: STUDENT IN AN ORGANIZED HEALTH CARE EDUCATION/TRAINING PROGRAM

## 2024-10-21 PROCEDURE — 84132 ASSAY OF SERUM POTASSIUM: CPT | Performed by: STUDENT IN AN ORGANIZED HEALTH CARE EDUCATION/TRAINING PROGRAM

## 2024-10-21 PROCEDURE — 0 DEXTROSE 5 % SOLUTION: Performed by: STUDENT IN AN ORGANIZED HEALTH CARE EDUCATION/TRAINING PROGRAM

## 2024-10-21 PROCEDURE — 97535 SELF CARE MNGMENT TRAINING: CPT

## 2024-10-21 PROCEDURE — 92526 ORAL FUNCTION THERAPY: CPT

## 2024-10-21 PROCEDURE — 97530 THERAPEUTIC ACTIVITIES: CPT

## 2024-10-21 PROCEDURE — 0W9930Z DRAINAGE OF RIGHT PLEURAL CAVITY WITH DRAINAGE DEVICE, PERCUTANEOUS APPROACH: ICD-10-PCS | Performed by: INTERNAL MEDICINE

## 2024-10-21 PROCEDURE — 74018 RADEX ABDOMEN 1 VIEW: CPT

## 2024-10-21 PROCEDURE — 25010000002 ZIPRASIDONE MESYLATE PER 10 MG

## 2024-10-21 PROCEDURE — 82607 VITAMIN B-12: CPT | Performed by: STUDENT IN AN ORGANIZED HEALTH CARE EDUCATION/TRAINING PROGRAM

## 2024-10-21 PROCEDURE — 25010000002 POTASSIUM CHLORIDE 10 MEQ/100ML SOLUTION: Performed by: STUDENT IN AN ORGANIZED HEALTH CARE EDUCATION/TRAINING PROGRAM

## 2024-10-21 PROCEDURE — 25010000002 HYDROMORPHONE PER 4 MG: Performed by: STUDENT IN AN ORGANIZED HEALTH CARE EDUCATION/TRAINING PROGRAM

## 2024-10-21 PROCEDURE — 97129 THER IVNTJ 1ST 15 MIN: CPT

## 2024-10-21 RX ORDER — LABETALOL HYDROCHLORIDE 5 MG/ML
10 INJECTION, SOLUTION INTRAVENOUS EVERY 6 HOURS PRN
Status: DISCONTINUED | OUTPATIENT
Start: 2024-10-21 | End: 2024-10-24 | Stop reason: HOSPADM

## 2024-10-21 RX ORDER — DEXTROSE MONOHYDRATE 50 MG/ML
75 INJECTION, SOLUTION INTRAVENOUS CONTINUOUS
Status: DISCONTINUED | OUTPATIENT
Start: 2024-10-21 | End: 2024-10-24 | Stop reason: HOSPADM

## 2024-10-21 RX ORDER — HYDROMORPHONE HYDROCHLORIDE 1 MG/ML
0.25 INJECTION, SOLUTION INTRAMUSCULAR; INTRAVENOUS; SUBCUTANEOUS ONCE
Status: COMPLETED | OUTPATIENT
Start: 2024-10-21 | End: 2024-10-21

## 2024-10-21 RX ADMIN — POTASSIUM CHLORIDE 10 MEQ: 7.46 INJECTION, SOLUTION INTRAVENOUS at 01:40

## 2024-10-21 RX ADMIN — DEXTROSE MONOHYDRATE 75 ML/HR: 50 INJECTION, SOLUTION INTRAVENOUS at 10:16

## 2024-10-21 RX ADMIN — POTASSIUM CHLORIDE 10 MEQ: 7.46 INJECTION, SOLUTION INTRAVENOUS at 00:36

## 2024-10-21 RX ADMIN — HYDRALAZINE HYDROCHLORIDE 10 MG: 20 INJECTION INTRAMUSCULAR; INTRAVENOUS at 17:15

## 2024-10-21 RX ADMIN — CEFTRIAXONE 1000 MG: 1 INJECTION, POWDER, FOR SOLUTION INTRAMUSCULAR; INTRAVENOUS at 09:06

## 2024-10-21 RX ADMIN — POTASSIUM CHLORIDE 10 MEQ: 7.46 INJECTION, SOLUTION INTRAVENOUS at 02:30

## 2024-10-21 RX ADMIN — POTASSIUM CHLORIDE 10 MEQ: 7.46 INJECTION, SOLUTION INTRAVENOUS at 03:49

## 2024-10-21 RX ADMIN — ZIPRASIDONE MESYLATE 20 MG: 20 INJECTION, POWDER, LYOPHILIZED, FOR SOLUTION INTRAMUSCULAR at 04:15

## 2024-10-21 RX ADMIN — HYDROMORPHONE HYDROCHLORIDE 0.25 MG: 1 INJECTION, SOLUTION INTRAMUSCULAR; INTRAVENOUS; SUBCUTANEOUS at 16:35

## 2024-10-21 NOTE — NURSING NOTE
"Pt. Agitated at start of shift. Pt. Hallucinating and stating there was a man in the room trying to give him coffee, stated \"that man is telling me it is time to go\". Pt. Restrained, pulling at lines and tubes and throwing feet over edge of bed. Blood glucose level checked, resulting at 44. IV DW50 given, Provider on call notified and order for IV fluid D10 placed. Remote sitter failed, MD ordered bedside sitter. Nurse sat with pt. 1:1 from [id number] for pt. Safety. One time dose of IM Olanzapine and q4 PRN IM Geodon ordered.  "

## 2024-10-21 NOTE — CONSULTS
[facility]: [facility]         CONSULT NOTE    Patient Identification:  Shahram Chaney  69 y.o.  male  1955  [id number]            Requesting physician: Attending physician    Reason for Consultation: Pneumothorax      History of Present Illness:  69-year-old male with history of HTN,  AAA, psychosis, dementia, alcoholism, anemia and monoclonal gammopathy who presented 10/16/2024 with altered mental status and lethargy for few days.  He was admitted for altered mental status and reported aspiration.  Evaluated by psychiatry and diagnosed with dementia with behavioral disturbances.  Patient is afebrile, blood pressure 157/99, oxygen saturation 100% on 4 L per high flow nasal cannula.  WBC 6.2, hemoglobin 9.6, chest x-ray with moderate right-sided pneumothorax, mild right perihilar atelectasis, left lung scarring      Assessment:  AMS (altered mental status)/delirium: Due to dementia with behavioral disturbances  Aspiration pneumonia/pneumonitis  Spontaneous moderate right pneumothorax  Hypoxia: ABGs on 10/16/2024 7.47/30.9/90.9  UTI: Cultures positive for gram-negative bacilli  Hypertension  Anemia  History of dysphagia status post G-tube placement.    Evaluated by SLP and diagnosed with moderate-severe dysphagia and recommended aspiration precautions      Recommendations:  The pneumothorax appeared on chest x-ray 10/20/2024 and appears to be spontaneous, repeated chest x-ray shows expansion of the right pneumothorax, I will place a chest tube today      Oxygen supplement and titration to maintain saturation 90 to 95%: Currently on 4 L per nasal cannula    Aspiration precautions  Antibiotics: Rocephin to complete 10/23/2024      I personally reviewed the radiological studies      Review of Sytems:  Unobtainable due to mental status    Past Medical History:  History reviewed. No pertinent past medical history.    Past Surgical History:  History reviewed. No pertinent surgical history.     Home  "Meds:  Medications Prior to Admission   Medication Sig Dispense Refill Last Dose/Taking    acetaminophen (Tylenol) 325 MG tablet Take 2 tablets by mouth Every 6 (Six) Hours As Needed for Mild Pain or Moderate Pain.   Past Month    amantadine (SYMMETREL) 100 MG tablet Take 1 tablet by mouth Every 12 (Twelve) Hours.   10/16/2024    atorvastatin (LIPITOR) 20 MG tablet Take 1 tablet by mouth Every Night.   10/16/2024    hydrALAZINE (APRESOLINE) 25 MG tablet Take 1 tablet by mouth Every 6 (Six) Hours As Needed (HTN).   Past Week    loperamide (IMODIUM A-D) 2 MG tablet Take 1 tablet by mouth Every 6 (Six) Hours As Needed for Diarrhea.   Past Month    LORazepam (Ativan) 1 MG tablet Take 1 tablet by mouth Every 6 (Six) Hours As Needed for Anxiety.   10/16/2024    losartan (COZAAR) 50 MG tablet Take 1 tablet by mouth Every 12 (Twelve) Hours.   10/16/2024    Melatonin 5 MG capsule Take 1 capsule by mouth Daily.   10/16/2024    mirtazapine (REMERON) 7.5 MG tablet Take 1 tablet by mouth Every Night.   Past Week    pantoprazole (PROTONIX) 40 MG EC tablet Take 1 tablet by mouth Daily.   10/16/2024    spironolactone (ALDACTONE) 25 MG tablet Take 0.5 tablets by mouth Daily.   10/16/2024    aspirin 81 MG chewable tablet Chew 1 tablet Daily.          Allergies:  No Known Allergies    Social History:   Social History     Socioeconomic History    Marital status:    Tobacco Use    Smoking status: Unknown   Vaping Use    Vaping status: Never Used   Substance and Sexual Activity    Alcohol use: Not Currently     Comment: not for several months    Drug use: Yes     Types: \"Crack\" cocaine, Cocaine(coke)    Sexual activity: Defer     Partners: Male       Family History:  History reviewed. No pertinent family history.    Physical Exam:  /99 (BP Location: Right arm, Patient Position: Lying)   Pulse 102   Temp 98.6 °F (37 °C) (Oral)   Resp 16   Ht 172.7 cm (68\")   Wt 71.8 kg (158 lb 4.6 oz)   SpO2 100%   BMI 24.07 kg/m²  " Body mass index is 24.07 kg/m². 100% 71.8 kg (158 lb 4.6 oz)  General Appearance: Awake but confused  HEENT:  Normocephalic, without obvious abnormality, Conjunctiva/corneas clear,.   Nares normal, no drainage     Neck:  Supple, symmetrical, trachea midline. No JVD.  Lungs /Chest wall:   Decreased breath sounds on right side, respirations unlabored, symmetrical wall movement.     Heart:  Regular rate and rhythm, S1 S2 normal  Abdomen: Soft, non-tender, no masses, no organomegaly.    Extremities: No edema, no clubbing or cyanosis    LABS:  Lab Results   Component Value Date    CALCIUM 8.5 (L) 10/20/2024    PHOS 3.0 10/20/2024     Results from last 7 days   Lab Units 10/20/24  2257 10/20/24  0827 10/19/24  1451 10/19/24  0217 10/18/24  0235 10/17/24  0416 10/16/24  1103   MAGNESIUM mg/dL  --  1.7  --  2.0 1.7   < > 1.8   SODIUM mmol/L 142 141  --  145 142   < > 147*   POTASSIUM mmol/L 3.5 4.2 3.5 3.0* 3.8   < > 4.1   CHLORIDE mmol/L 112* 111*  --  111* 108*   < > 109*   CO2 mmol/L 19.6* 18.7*  --  24.1 24.2   < > 25.8   BUN mg/dL 6* 9  --  10 10   < > 10   CREATININE mg/dL 0.64* 0.75*  --  0.76 0.86   < > 1.14   GLUCOSE mg/dL 97 115*  --  164* 98   < > 68   CALCIUM mg/dL 8.5* 8.9  --  9.1 8.6   < > 8.8   WBC 10*3/mm3 6.25 6.74  --  9.29 12.09*   < > 8.39   HEMOGLOBIN g/dL 9.6* 10.4*  --  10.6* 9.4*   < > 9.2*   PLATELETS 10*3/mm3 228 259  --  248 224   < > 226   ALT (SGPT) U/L  --   --   --   --   --   --  18   AST (SGOT) U/L  --   --   --   --   --   --  24    < > = values in this interval not displayed.     Lab Results   Component Value Date    CKTOTAL 156 10/16/2024     Results from last 7 days   Lab Units 10/16/24  1103   CK TOTAL U/L 156     Results from last 7 days   Lab Units 10/19/24  0716 10/16/24  1103   BLOODCX   --  No growth at 4 days  No growth at 4 days   URINECX  >100,000 CFU/mL Gram Negative Bacilli*  --          Results from last 7 days   Lab Units 10/16/24  1426   PH, ARTERIAL pH units 7.477*    PCO2, ARTERIAL mm Hg 30.5*   PO2 ART mm Hg 90.9   O2 SATURATION ART % 97.7   MODALITY  Room Air     Results from last 7 days   Lab Units 10/16/24  2018   ADENOVIRUS DETECTION BY PCR  Not Detected   CORONAVIRUS 229E  Not Detected   CORONAVIRUS HKU1  Not Detected   CORONAVIRUS NL63  Not Detected   CORONAVIRUS OC43  Not Detected   HUMAN METAPNEUMOVIRUS  Not Detected   HUMAN RHINOVIRUS/ENTEROVIRUS  Not Detected   INFLUENZA B PCR  Not Detected   PARAINFLUENZA 1  Not Detected   PARAINFLUENZA VIRUS 2  Not Detected   PARAINFLUENZA VIRUS 3  Not Detected   PARAINFLUENZA VIRUS 4  Not Detected   BORDETELLA PERTUSSIS PCR  Not Detected   CHLAMYDOPHILA PNEUMONIAE PCR  Not Detected   MYCOPLAMA PNEUMO PCR  Not Detected   INFLUENZA A PCR  Not Detected   RSV, PCR  Not Detected         Results from last 7 days   Lab Units 10/19/24  0716 10/16/24  1103   BLOODCX   --  No growth at 4 days  No growth at 4 days   URINECX  >100,000 CFU/mL Gram Negative Bacilli*  --      Lab Results   Component Value Date    TSH 2.200 10/16/2024     Estimated Creatinine Clearance: 109.1 mL/min (A) (by C-G formula based on SCr of 0.64 mg/dL (L)).  Results from last 7 days   Lab Units 10/19/24  0716   NITRITE UA  Positive*   WBC UA /HPF 21-50*   BACTERIA UA /HPF 4+*   SQUAM EPITHEL UA /HPF 0-2   URINECX  >100,000 CFU/mL Gram Negative Bacilli*        Imaging:  Imaging Results (Last 24 Hours)       Procedure Component Value Units Date/Time    XR Abdomen KUB [[id number]] Collected: 10/21/24 0653     Updated: 10/21/24 0656    Narrative:      XR ABDOMEN KUB    Date of Exam: 10/21/2024 6:51 AM EDT    Indication: Severe abdominal pain    Comparison: 10/20/2024.    Findings:  Feeding tube is removed. There is mild gaseous distention of the stomach. There are gas-filled loops of nondistended small bowel. Barium contrast is seen throughout the colon. No pneumatosis intestinalis. No evidence of pneumoperitoneum. No definite   pathologic abdominal calcification. Mild  thoracolumbar spondylosis.      Impression:      Impression:  No acute abdominal abnormality.        Electronically Signed: Aron Santos MD    10/21/2024 6:54 AM EDT    Workstation ID: [id number]    XR Chest 1 View [[id number]] Collected: 10/20/24 2232     Updated: 10/20/24 2235    Narrative:      XR CHEST 1 VW    Date of Exam: 10/20/2024 9:18 PM EDT    Indication: Pneumothroax    Comparison: 10/20/2024.    Findings:  There is a stable moderate right-sided pneumothorax with pleural separation measuring up to 44 mm. There is minor atelectasis in the right perihilar lung, which appears increased from the prior study. There is linear scarring in the left mid and lower   lung. Heart size and pulmonary vasculature appear within normal limits. No pleural effusion or focal pneumonia.      Impression:      Impression:  1.Stable moderate right-sided pneumothorax.  2.Mild right perihilar atelectasis.  3.Left lung scarring.        Electronically Signed: Aron Santos MD    10/20/2024 10:32 PM EDT    Workstation ID: [id number]    XR Chest 1 View [[id number]] Collected: 10/20/24 1603     Updated: 10/20/24 1614    Narrative:      XR CHEST 1 VW    Date of Exam: 10/20/2024 3:27 PM EDT    Indication: Aspiration    Comparison: Chest radiograph dated 10/16/2024    Findings:  The cardiac silhouette is within normal limits. Pulmonary vascularity appears normal. There is a new 4 cm right-sided pneumothorax. There is no mediastinal shift. There is no pleural effusion. No acute osseous findings.      Impression:      Impression:  1. New 4 cm right-sided pneumothorax.    Findings were called to patient's nurse Scarlet at 4:12 p.m. on 10/20/2024. She will relay the results to Dr. Mahmood.      Electronically Signed: Monico Cosby    10/20/2024 4:12 PM EDT    Workstation ID: [id number]    XR Abdomen KUB [[id number]] Collected: 10/20/24 1453     Updated: 10/20/24 1500    Narrative:      XR ABDOMEN KUB    Date of Exam: 10/20/2024 2:33 PM  EDT    Indication: ng placement    Comparison: None available.    Findings:  An enteric tube is present, which appears to follow the course of the right mainstem bronchus, projecting into the right medial lower thorax.     There is radiopaque contrast within the colon.         Impression:      Impression:  Enteric tube within the right mainstem bronchus.    These findings were reported to the patient's nurse on 10/20/2024 at approximately 3:00 p.m. eastern standard time. She received the findings and reported that the enteric tube has already been removed.      Electronically Signed: Jennifer Simpson MD    10/20/2024 2:58 PM EDT    Workstation ID: [id number]              Current Meds:   SCHEDULE  amantadine, 100 mg, Nasogastric, Q12H  Barium Sulfate, 1 teaspoon(s), Oral, Once in imaging  barium sulfate, 50 mL, Oral, Once in imaging  barium sulfate, 50 mL, Oral, Once in imaging  cefTRIAXone, 1,000 mg, Intravenous, Q24H  mirtazapine, 7.5 mg, Nasogastric, Nightly      Infusions  dextrose, 75 mL/hr, Last Rate: 75 mL/hr (10/21/24 1016)      PRNs    acetaminophen **OR** acetaminophen **OR** acetaminophen    aluminum-magnesium hydroxide-simethicone    senna-docusate sodium **AND** polyethylene glycol **AND** bisacodyl **AND** bisacodyl    Calcium Replacement - Follow Nurse / BPA Driven Protocol    dextrose    dextrose    glucagon (human recombinant)    hydrALAZINE    Magnesium Standard Dose Replacement - Follow Nurse / BPA Driven Protocol    melatonin    nitroglycerin    ondansetron ODT **OR** [DISCONTINUED] ondansetron    Phosphorus Replacement - Follow Nurse / BPA Driven Protocol    Potassium Replacement - Follow Nurse / BPA Driven Protocol    sodium chloride    ziprasidone (GEODON) 20 mg in sterile water (preservative free) 1 mL injection        Mitch Elliott MD  10/21/2024  10:41 EDT      Much of this encounter note is an electronic transcription/translation of spoken language to printed text using Dragon Software.

## 2024-10-21 NOTE — PROCEDURES
Right chest tube placement    Indications:  Clinically significant Pneumothorax    Pre-operative Diagnosis: Pneumothorax    Post-operative Diagnosis: Pneumothorax    Procedure Details   Informed consent was obtained from POA for the procedure, including sedation/analgesia if needed.  Risks of lung perforation, hemorrhage, arrhythmia, and adverse drug reaction were discussed.   Timeout was done on the standard manner.    After sterile skin prep, using standard technique,  Using Seldinger technique: The insertion needle was introduced and then a guidewire was passed through the needle which was removed, the dilator was used over the guidewire, after removing the dilator the pigtail catheter was placed and the guidewire was removed.  The catheter was secured to place with  dressing.     A 14 Occitan tube was placed in the right anterior second rib space in the midclavicular line.    Findings:  Air returned after placing the tube    Estimated Blood Loss:  Minimal           Specimens:  None                Complications:  None; patient tolerated the procedure well.                    Condition: stable    Post op plan:  CXR   -20 cm H2O suction    Attending Attestation: I performed the procedure.

## 2024-10-21 NOTE — PROGRESS NOTES
Nutrition Services    Patient Name: Shahram Chaney  YOB: 1955  MRN: [id number]  Admission date: 10/16/2024    PROGRESS NOTE      Encounter Information: Checking in on the tube feeding plan. Pt continues with no tube feeding access. Per Hospitalist note, plan to repeat VFSS today and does not anticipate that pt will need a PEG. NFPE completed, consistent with nutrition diagnosis of malnutrition using AND/ASPEN criteria. See MSA below.      Moderate malnutrition related to dementia/dysphagia as evidenced by moderate to severe fat and muscle wasting per NFPE        PO Diet: NPO Diet NPO Type: Strict NPO   PO Supplements: None, pt is NPO    PO Intake:  N/A       Current nutrition support: Isosource 1.5 at 30 ml/hr ordered, 30 ml/hr water flush    Nutrition support review: No feeding access.        Labs (reviewed below): None resulted today.         GI Function:  Last BM documented 10/18        Nutrition Intervention Updates: Monitor decisions regarding nutrition. Tube feeding order is in for if pt obtains feeding access.     Isosource 1.5 at 30 ml/hr, 30 ml/hr water flush        Results from last 7 days   Lab Units 10/20/24  2257 10/20/24  0827 10/19/24  1451 10/19/24  0217 10/17/24  0416 10/16/24  1103   SODIUM mmol/L 142 141  --  145   < > 147*   POTASSIUM mmol/L 3.5 4.2 3.5 3.0*   < > 4.1   CHLORIDE mmol/L 112* 111*  --  111*   < > 109*   CO2 mmol/L 19.6* 18.7*  --  24.1   < > 25.8   BUN mg/dL 6* 9  --  10   < > 10   CREATININE mg/dL 0.64* 0.75*  --  0.76   < > 1.14   CALCIUM mg/dL 8.5* 8.9  --  9.1   < > 8.8   BILIRUBIN mg/dL  --   --   --   --   --  1.1   ALK PHOS U/L  --   --   --   --   --  289*   ALT (SGPT) U/L  --   --   --   --   --  18   AST (SGOT) U/L  --   --   --   --   --  24   GLUCOSE mg/dL 97 115*  --  164*   < > 68    < > = values in this interval not displayed.     Results from last 7 days   Lab Units 10/20/24  2257 10/20/24  0827 10/19/24  1451 10/19/24  0217 10/18/24  0235   MAGNESIUM mg/dL  " --  1.7  --  2.0 1.7   PHOSPHORUS mg/dL 3.0 2.2*   < > 2.2* 2.3*   HEMOGLOBIN g/dL 9.6* 10.4*  --  10.6* 9.4*   HEMATOCRIT % 30.6* 33.3*  --  34.0* 29.9*    < > = values in this interval not displayed.     COVID19   Date Value Ref Range Status   10/16/2024 Not Detected Not Detected - Ref. Range Final     No results found for: \"HGBA1C\"    Malnutrition Severity Assessment      Patient meets criteria for : Moderate (non-severe) Malnutrition  Malnutrition Type (Last 8 Hours)       Malnutrition Severity Assessment       Row Name 10/21/24 0931       Malnutrition Severity Assessment    Malnutrition Type Chronic Disease - Related Malnutrition      Row Name 10/21/24 0931       Muscle Loss    Loss of Muscle Mass Findings Moderate    [place] Region Moderate - slight depression    Clavicle Bone Region Moderate - some protrusion in females, visible in males    Dorsal Hand Region Moderate - slight depression    Anterior Thigh Region Severe - line/depression along thigh, obviously thin    Posterior Calf Region Severe - thin with very little definition/firmness      Row Name 10/21/24 0931       Fat Loss    Subcutaneous Fat Loss Findings Moderate    Orbital Region  Severe - pronounced hollowness/depression, dark circles, loose saggy skin    Upper Arm Region Moderate - some fat tissue, not ample      Row Name 10/21/24 0931       Criteria Met (Must meet criteria for severity in at least 2 of these categories: M Wasting, Fat Loss, Fluid, Secondary Signs, Wt. Status, Intake)    Patient meets criteria for  Moderate (non-severe) Malnutrition                          RD to follow up per protocol.    Electronically signed by:  Mayda Dailey RD  10/21/24 08:14 EDT   "

## 2024-10-21 NOTE — PLAN OF CARE
Problem: Adult Inpatient Plan of Care  Goal: Plan of Care Review  Outcome: Progressing  Goal: Patient-Specific Goal (Individualized)  Outcome: Progressing  Goal: Absence of Hospital-Acquired Illness or Injury  Outcome: Progressing  Intervention: Identify and Manage Fall Risk  Recent Flowsheet Documentation  Taken 10/21/2024 0421 by Xiomy Bear LPN  Safety Promotion/Fall Prevention:   activity supervised   assistive device/personal items within reach  Taken 10/21/2024 0200 by Xiomy Bear LPN  Safety Promotion/Fall Prevention:   activity supervised   assistive device/personal items within reach  Taken 10/20/2024 2200 by Xiomy Bear LPN  Safety Promotion/Fall Prevention:   activity supervised   assistive device/personal items within reach  Taken 10/20/2024 2000 by Xiomy Bear LPN  Safety Promotion/Fall Prevention:   activity supervised   assistive device/personal items within reach  Intervention: Prevent Skin Injury  Recent Flowsheet Documentation  Taken 10/20/2024 2000 by Xiomy Bear LPN  Body Position: sitting up in bed  Skin Protection: incontinence pads utilized  Intervention: Prevent and Manage VTE (Venous Thromboembolism) Risk  Recent Flowsheet Documentation  Taken 10/21/2024 0421 by Xiomy Bear LPN  VTE Prevention/Management:   bilateral   SCDs (sequential compression devices) off   patient refused intervention  Taken 10/20/2024 2000 by Xiomy Bear LPN  VTE Prevention/Management:   bilateral   SCDs (sequential compression devices) off   patient refused intervention  Intervention: Prevent Infection  Recent Flowsheet Documentation  Taken 10/21/2024 0421 by Xiomy Bear LPN  Infection Prevention:   environmental surveillance performed   hand hygiene promoted  Taken 10/21/2024 0200 by Xiomy Bear LPN  Infection Prevention:   environmental surveillance performed   equipment surfaces disinfected  Taken 10/20/2024 2200 by Xiomy Bear LPN  Infection Prevention:    environmental surveillance performed   equipment surfaces disinfected  Taken 10/20/2024 2000 by Xiomy Bear LPN  Infection Prevention:   environmental surveillance performed   equipment surfaces disinfected  Goal: Optimal Comfort and Wellbeing  Outcome: Progressing  Intervention: Provide Person-Centered Care  Recent Flowsheet Documentation  Taken 10/21/2024 0421 by Xiomy Bear LPN  Trust Relationship/Rapport:   thoughts/feelings acknowledged   empathic listening provided   choices provided   care explained  Taken 10/20/2024 2000 by Xiomy Bear LPN  Trust Relationship/Rapport:   thoughts/feelings acknowledged   reassurance provided   emotional support provided   choices provided  Goal: Readiness for Transition of Care  Outcome: Progressing     Problem: Fall Injury Risk  Goal: Absence of Fall and Fall-Related Injury  Outcome: Progressing  Intervention: Identify and Manage Contributors  Recent Flowsheet Documentation  Taken 10/21/2024 0421 by Xiomy Bear LPN  Medication Review/Management: medications reviewed  Taken 10/21/2024 0200 by Xiomy Bear LPN  Medication Review/Management: medications reviewed  Taken 10/20/2024 2200 by Xiomy Bear LPN  Medication Review/Management: medications reviewed  Taken 10/20/2024 2000 by Xiomy Bear LPN  Medication Review/Management: medications reviewed  Intervention: Promote Injury-Free Environment  Recent Flowsheet Documentation  Taken 10/21/2024 0421 by Xiomy Bear LPN  Safety Promotion/Fall Prevention:   activity supervised   assistive device/personal items within reach  Taken 10/21/2024 0200 by Xiomy Bear LPN  Safety Promotion/Fall Prevention:   activity supervised   assistive device/personal items within reach  Taken 10/20/2024 2200 by Xiomy Bear LPN  Safety Promotion/Fall Prevention:   activity supervised   assistive device/personal items within reach  Taken 10/20/2024 2000 by Xiomy Bear LPN  Safety Promotion/Fall  Prevention:   activity supervised   assistive device/personal items within reach     Problem: Skin Injury Risk Increased  Goal: Skin Health and Integrity  Outcome: Progressing  Intervention: Optimize Skin Protection  Recent Flowsheet Documentation  Taken 10/21/2024 0421 by Xiomy Bear LPN  Activity Management: bedrest  Taken 10/20/2024 2000 by Xiomy Bear LPN  Activity Management: bedrest  Pressure Reduction Techniques: frequent weight shift encouraged  Head of Bed (HOB) Positioning:   HOB elevated   HOB at 60-90 degrees  Pressure Reduction Devices: pressure-redistributing mattress utilized  Skin Protection: incontinence pads utilized     Problem: Confusion Acute  Goal: Optimal Cognitive Function  Outcome: Progressing     Problem: Seizure, Active Management  Goal: Absence of Seizure/Seizure-Related Injury  Outcome: Progressing     Problem: Malnutrition  Goal: Improved Nutritional Intake  Outcome: Progressing     Problem: Electrolyte Imbalance  Goal: Electrolyte Balance  Outcome: Progressing     Problem: Hospitalized Older Adult  Goal: Optimal Cognitive Function  Outcome: Progressing  Goal: Effective Bowel Elimination  Outcome: Progressing  Goal: Optimal Coping  Outcome: Progressing  Intervention: Promote Psychosocial Wellbeing  Recent Flowsheet Documentation  Taken 10/21/2024 0421 by Xiomy Bear LPN  Family/Support System Care: self-care encouraged  Taken 10/20/2024 2000 by Xiomy Bear LPN  Family/Support System Care: self-care encouraged  Goal: Fluid and Electrolyte Balance  Outcome: Progressing  Goal: Optimal Functional Ability  Outcome: Progressing  Intervention: Promote Functional Ability  Recent Flowsheet Documentation  Taken 10/21/2024 0421 by Xiomy Bear LPN  Activity Management: bedrest  Activity Assistance Provided: assistance, 2 people  Taken 10/20/2024 2000 by Xiomy Bear LPN  Activity Management: bedrest  Activity Assistance Provided: assistance, 2 people  Goal: Improved  Oral Intake  Outcome: Progressing  Goal: Adequate Sleep/Rest  Outcome: Progressing  Goal: Effective Urinary Elimination  Outcome: Progressing     Problem: Wound  Goal: Optimal Coping  Outcome: Progressing  Intervention: Support Patient and Family Response  Recent Flowsheet Documentation  Taken 10/21/2024 0421 by Xiomy Bear LPN  Family/Support System Care: self-care encouraged  Taken 10/20/2024 2000 by Xiomy Bear LPN  Family/Support System Care: self-care encouraged  Goal: Optimal Functional Ability  Outcome: Progressing  Intervention: Optimize Functional Ability  Recent Flowsheet Documentation  Taken 10/21/2024 0421 by Xiomy Bear LPN  Activity Management: bedrest  Activity Assistance Provided: assistance, 2 people  Taken 10/20/2024 2000 by Xiomy Bear LPN  Activity Management: bedrest  Activity Assistance Provided: assistance, 2 people  Goal: Absence of Infection Signs and Symptoms  Outcome: Progressing  Intervention: Prevent or Manage Infection  Recent Flowsheet Documentation  Taken 10/21/2024 0421 by Xiomy Bear LPN  Isolation Precautions: precautions maintained  Taken 10/21/2024 0200 by Xiomy Bear LPN  Isolation Precautions: precautions maintained  Taken 10/20/2024 2200 by Xiomy Bear LPN  Isolation Precautions: precautions maintained  Taken 10/20/2024 2000 by Xiomy Bear LPN  Isolation Precautions: precautions maintained  Goal: Improved Oral Intake  Outcome: Progressing  Goal: Optimal Pain Control and Function  Outcome: Progressing  Goal: Skin Health and Integrity  Outcome: Progressing  Intervention: Optimize Skin Protection  Recent Flowsheet Documentation  Taken 10/21/2024 0421 by Xiomy Bear LPN  Activity Management: bedrest  Taken 10/20/2024 2000 by Xiomy Bear LPN  Activity Management: bedrest  Pressure Reduction Techniques: frequent weight shift encouraged  Head of Bed (HOB) Positioning:   HOB elevated   HOB at 60-90 degrees  Pressure Reduction Devices:  pressure-redistributing mattress utilized  Goal: Optimal Wound Healing  Outcome: Progressing     Problem: Glycemic Control Impaired  Goal: Blood Glucose Level Within Target Range  Outcome: Progressing  Goal: Minimize Hypoglycemia Risk  Outcome: Progressing     Problem: Restraint, Nonviolent  Goal: Absence of Harm or Injury  Outcome: Progressing  Intervention: Implement Least Restrictive Safety Strategies  Recent Flowsheet Documentation  Taken 10/21/2024 0421 by Xiomy Bear LPN  Medical Device Protection: IV pole/bag removed from visual field  Diversional Activities: television  Taken 10/21/2024 0400 by Xiomy Bear LPN  Medical Device Protection: IV pole/bag removed from visual field  Diversional Activities: television  Taken 10/21/2024 0200 by Xiomy Bear LPN  Medical Device Protection: IV pole/bag removed from visual field  Diversional Activities: music  Taken 10/21/2024 0000 by Xiomy Bear LPN  Medical Device Protection: IV pole/bag removed from visual field  Diversional Activities: music  Taken 10/20/2024 2220 by Xiomy Bear LPN  Medical Device Protection: IV pole/bag removed from visual field  Taken 10/20/2024 2200 by Xiomy Bear LPN  Medical Device Protection: IV pole/bag removed from visual field  Diversional Activities: (quiet yarely, sleep promoted) other (see comments)  Taken 10/20/2024 2000 by Xiomy Bear LPN  Medical Device Protection: IV pole/bag removed from visual field  Diversional Activities: television  Intervention: Protect Dignity, Rights and Personal Wellbeing  Recent Flowsheet Documentation  Taken 10/21/2024 0421 by Xiomy Bear LPN  Trust Relationship/Rapport:   thoughts/feelings acknowledged   empathic listening provided   choices provided   care explained  Taken 10/20/2024 2000 by Xiomy Bear LPN  Trust Relationship/Rapport:   thoughts/feelings acknowledged   reassurance provided   emotional support provided   choices provided  Intervention: Protect  Skin and Joint Integrity  Recent Flowsheet Documentation  Taken 10/21/2024 0421 by Xiomy Bear LPN  Range of Motion: active ROM (range of motion) encouraged  Taken 10/20/2024 2000 by Xiomy Bear LPN  Body Position: sitting up in bed  Skin Protection: incontinence pads utilized  Range of Motion: active ROM (range of motion) encouraged   Goal Outcome Evaluation:

## 2024-10-21 NOTE — PROGRESS NOTES
LOS: 4 days   Patient Care Team:  Zaki Fontanez MD as PCP - General (Family Medicine)      Subjective     Interval History:     Subjective: History is limited due to altered mental status and supplemented from chart review and bedside RN.  Patient had not had difficulty swallowing prior to admission.  Patient complains of abdominal pain.      ROS:   Unable to obtain due to altered mental status       Medication Review:     Current Facility-Administered Medications:     acetaminophen (TYLENOL) tablet 650 mg, 650 mg, Oral, Q4H PRN **OR** acetaminophen (TYLENOL) 160 MG/5ML oral solution 650 mg, 650 mg, Oral, Q4H PRN, 650 mg at 10/19/24 2011 **OR** acetaminophen (TYLENOL) suppository 650 mg, 650 mg, Rectal, Q4H PRN, Isra Mcghee MD    aluminum-magnesium hydroxide-simethicone (MAALOX MAX) 400-400-40 MG/5ML suspension 15 mL, 15 mL, Oral, Q6H PRN, Isra Mcghee MD    amantadine (SYMMETREL) capsule 100 mg, 100 mg, Nasogastric, Q12H, Jose Roberto Mahmood MD, 100 mg at 10/20/24 1015    Barium Sulfate 60 % cream 1 teaspoon(s), 1 teaspoon(s), Oral, Once in imaging, Jose Roberto Mahmood MD    barium sulfate oral suspension 50 mL, 50 mL, Oral, Once in imaging, Jose Roberto Mahmood MD    barium sulfate oral suspension 50 mL, 50 mL, Oral, Once in imaging, Jose Roberto Mahmood MD    sennosides-docusate (PERICOLACE) 8.6-50 MG per tablet 2 tablet, 2 tablet, Oral, BID PRN **AND** polyethylene glycol (MIRALAX) packet 17 g, 17 g, Oral, Daily PRN **AND** bisacodyl (DULCOLAX) EC tablet 5 mg, 5 mg, Oral, Daily PRN **AND** bisacodyl (DULCOLAX) suppository 10 mg, 10 mg, Rectal, Daily PRN, Isra Mcghee MD    Calcium Replacement - Follow Nurse / BPA Driven Protocol, , Does not apply, PRN, Isra Mcghee MD    cefTRIAXone (ROCEPHIN) 1,000 mg in sodium chloride 0.9 % 100 mL MBP, 1,000 mg, Intravenous, Q24H, Jose Roberto Mahmood MD, Last Rate: 200 mL/hr at 10/21/24 0906, 1,000 mg at 10/21/24 0906    dextrose (D50W) (25 g/50 mL) IV injection 25 g, 25 g, Intravenous, Q15 Min  Taz HENRIQUEZ Yadana, MD, 25 g at 10/20/24 1914    dextrose (D5W) 5 % infusion, 75 mL/hr, Intravenous, Continuous, Daniel Hodge MD, Last Rate: 75 mL/hr at 10/21/24 1016, 75 mL/hr at 10/21/24 1016    dextrose (GLUTOSE) oral gel 15 g, 15 g, Oral, Q15 Min PRTaz GONZALEZ Yadana, MD    glucagon (GLUCAGEN) injection 1 mg, 1 mg, Subcutaneous, Q15 Min PRTaz GOZNALEZ Yadana, MD    hydrALAZINE (APRESOLINE) injection 10 mg, 10 mg, Intravenous, Q6H PRTaz GONZALEZ Yadana, MD, 10 mg at 10/20/24 1805    Magnesium Standard Dose Replacement - Follow Nurse / BPA Driven Protocol, , Does not apply, Taz HENRIQUEZ Yadana, MD    melatonin tablet 5 mg, 5 mg, Oral, Nightly PRNTaz Yadana, MD, 5 mg at 10/17/24 2002    mirtazapine (REMERON) tablet 7.5 mg, 7.5 mg, Nasogastric, Nightly, EifaviokJose Roberto MD, 7.5 mg at 10/19/24 2011    nitroglycerin (NITROSTAT) SL tablet 0.4 mg, 0.4 mg, Sublingual, Q5 Min PRTaz GONZALEZ Yadana, MD    ondansetron ODT (ZOFRAN-ODT) disintegrating tablet 4 mg, 4 mg, Oral, Q6H PRN **OR** [DISCONTINUED] ondansetron (ZOFRAN) injection 4 mg, 4 mg, Intravenous, Q6H PRTaz GONZALEZ Yadana, MD    Phosphorus Replacement - Follow Nurse / BPA Driven Protocol, , Does not apply, Taz HENRIQUEZ Yadana, MD    Potassium Replacement - Follow Nurse / BPA Driven Protocol, , Does not apply, Taz HENRIQUEZ Yadana, MD    sodium chloride 0.9 % flush 10 mL, 10 mL, Intravenous, PRTaz GONZALEZ Yadana, MD    ziprasidone (GEODON) 20 mg in sterile water (preservative free) 1 mL injection, 20 mg, Intramuscular, Q4H PRN, Candice Rucker, APRN, 20 mg at 10/21/24 0415      Objective     Vital Signs  Vitals:    10/20/24 2218 10/21/24 0002 10/21/24 0345 10/21/24 0756   BP: 165/99 (!) 148/106 (!) 177/109 157/99   BP Location: Right arm Right arm Right arm Right arm   Patient Position: Lying Lying Lying Lying   Pulse: 102 96  102   Resp: 22 13 17 16   Temp: 98.9 °F (37.2 °C) 98.4 °F (36.9 °C)  98.6 °F (37 °C)   TempSrc: Axillary Oral  Oral   SpO2: 100%   100%   Weight:       Height:            Physical Exam:     General Appearance:    Awake and alert, in no acute distress, confused   Head:    Normocephalic, without obvious abnormality   Eyes:          Conjunctivae normal, anicteric sclera   Throat:   No oral lesions, no thrush, oral mucosa moist   Neck:   supple, no JVD   Lungs:     respirations regular, even and unlabored   Abdomen:     Soft, epigastric tenderness, no rebound or guarding, non-distended   Rectal:     Deferred   Extremities:   no cyanosis, restraints   Skin:   No bruising or rash, no jaundice        Results Review:    CBC    Results from last 7 days   Lab Units 10/20/24  2257 10/20/24  0827 10/19/24  0217 10/18/24  0235 10/17/24  0416 10/16/24  1103   WBC 10*3/mm3 6.25 6.74 9.29 12.09* 10.96* 8.39   HEMOGLOBIN g/dL 9.6* 10.4* 10.6* 9.4* 9.0* 9.2*   PLATELETS 10*3/mm3 228 259 248 224 236 226     CMP   Results from last 7 days   Lab Units 10/21/24  0934 10/20/24  2257 10/20/24  0827 10/19/24  1451 10/19/24  0217 10/18/24  0235 10/17/24  0416 10/16/24  1103   SODIUM mmol/L  --  142 141  --  145 142 146* 147*   POTASSIUM mmol/L 3.8 3.5 4.2 3.5 3.0* 3.8 3.9 4.1   CHLORIDE mmol/L  --  112* 111*  --  111* 108* 110* 109*   CO2 mmol/L  --  19.6* 18.7*  --  24.1 24.2 22.6 25.8   BUN mg/dL  --  6* 9  --  10 10 11 10   CREATININE mg/dL  --  0.64* 0.75*  --  0.76 0.86 1.00 1.14   GLUCOSE mg/dL  --  97 115*  --  164* 98 94 68   ALBUMIN g/dL  --   --   --   --   --   --   --  3.2*   BILIRUBIN mg/dL  --   --   --   --   --   --   --  1.1   ALK PHOS U/L  --   --   --   --   --   --   --  289*   AST (SGOT) U/L  --   --   --   --   --   --   --  24   ALT (SGPT) U/L  --   --   --   --   --   --   --  18   MAGNESIUM mg/dL  --   --  1.7  --  2.0 1.7 1.8 1.8   PHOSPHORUS mg/dL  --  3.0 2.2* 2.2* 2.2* 2.3* 2.4*  --    AMMONIA umol/L  --   --   --   --   --   --   --  34     Cr Clearance Estimated Creatinine Clearance: 109.1 mL/min (A) (by C-G formula based on SCr of 0.64 mg/dL (L)).  Coag     HbA1C No  "results found for: \"HGBA1C\"  Results from last 7 days   Lab Units 10/16/24  1103   CK TOTAL U/L 156     Infection   Results from last 7 days   Lab Units 10/19/24  0716 10/16/24  1103   BLOODCX   --  No growth at 4 days  No growth at 4 days   URINECX  >100,000 CFU/mL Gram Negative Bacilli*  --      UA    Results from last 7 days   Lab Units 10/19/24  0716   NITRITE UA  Positive*   WBC UA /HPF 21-50*   BACTERIA UA /HPF 4+*   SQUAM EPITHEL UA /HPF 0-2   URINECX  >100,000 CFU/mL Gram Negative Bacilli*     Microbiology Results (last 10 days)       Procedure Component Value - Date/Time    Urine Culture - Urine, Urine, Clean Catch [[id number]]  (Abnormal) Collected: 10/19/24 0716    Lab Status: Preliminary result Specimen: Urine, Clean Catch Updated: 10/20/24 1227     Urine Culture >100,000 CFU/mL Gram Negative Bacilli    Narrative:      Colonization of the urinary tract without infection is common. Treatment is discouraged unless the patient is symptomatic, pregnant, or undergoing an invasive urologic procedure.    CANDIDA AURIS PCR - Swab, Axilla Right, Axilla Left and Groin [[id number]]  (Normal) Collected: 10/16/24 2040    Lab Status: Final result Specimen: Swab from Axilla Right, Axilla Left and Groin Updated: 10/17/24 0957     JOAO AURIS PCR Not Detected    Respiratory Panel PCR w/COVID-19(SARS-CoV-2) ZELDA/ARA/RONALDO/PAD/COR/SHONDA In-House, NP Swab in UTM/VTM, 2 HR TAT - Swab, Nasopharynx [[id number]]  (Normal) Collected: 10/16/24 2018    Lab Status: Final result Specimen: Swab from Nasopharynx Updated: 10/16/24 2146     ADENOVIRUS, PCR Not Detected     Coronavirus 229E Not Detected     Coronavirus HKU1 Not Detected     Coronavirus NL63 Not Detected     Coronavirus OC43 Not Detected     COVID19 Not Detected     Human Metapneumovirus Not Detected     Human Rhinovirus/Enterovirus Not Detected     Influenza A PCR Not Detected     Influenza B PCR Not Detected     Parainfluenza Virus 1 Not Detected     Parainfluenza Virus 2 " Not Detected     Parainfluenza Virus 3 Not Detected     Parainfluenza Virus 4 Not Detected     RSV, PCR Not Detected     Bordetella pertussis pcr Not Detected     Bordetella parapertussis PCR Not Detected     Chlamydophila pneumoniae PCR Not Detected     Mycoplasma pneumo by PCR Not Detected    Narrative:      In the setting of a positive respiratory panel with a viral infection PLUS a negative procalcitonin without other underlying concern for bacterial infection, consider observing off antibiotics or discontinuation of antibiotics and continue supportive care. If the respiratory panel is positive for atypical bacterial infection (Bordetella pertussis, Chlamydophila pneumoniae, or Mycoplasma pneumoniae), consider antibiotic de-escalation to target atypical bacterial infection.    Blood Culture - Blood, Arm, Right [[id number]]  (Normal) Collected: 10/16/24 1103    Lab Status: Preliminary result Specimen: Blood from Arm, Right Updated: 10/20/24 1115     Blood Culture No growth at 4 days    Blood Culture - Blood, Arm, Right [[id number]]  (Normal) Collected: 10/16/24 1103    Lab Status: Preliminary result Specimen: Blood from Arm, Right Updated: 10/20/24 1115     Blood Culture No growth at 4 days          Imaging Results (Last 72 Hours)       Procedure Component Value Units Date/Time    XR Abdomen KUB [[id number]] Collected: 10/21/24 0653     Updated: 10/21/24 0656    Narrative:      XR ABDOMEN KUB    Date of Exam: 10/21/2024 6:51 AM EDT    Indication: Severe abdominal pain    Comparison: 10/20/2024.    Findings:  Feeding tube is removed. There is mild gaseous distention of the stomach. There are gas-filled loops of nondistended small bowel. Barium contrast is seen throughout the colon. No pneumatosis intestinalis. No evidence of pneumoperitoneum. No definite   pathologic abdominal calcification. Mild thoracolumbar spondylosis.      Impression:      Impression:  No acute abdominal abnormality.        Electronically  Signed: Aron Santos MD    10/21/2024 6:54 AM EDT    Workstation ID: [id number]    XR Chest 1 View [[id number]] Collected: 10/20/24 2232     Updated: 10/20/24 2235    Narrative:      XR CHEST 1 VW    Date of Exam: 10/20/2024 9:18 PM EDT    Indication: Pneumothroax    Comparison: 10/20/2024.    Findings:  There is a stable moderate right-sided pneumothorax with pleural separation measuring up to 44 mm. There is minor atelectasis in the right perihilar lung, which appears increased from the prior study. There is linear scarring in the left mid and lower   lung. Heart size and pulmonary vasculature appear within normal limits. No pleural effusion or focal pneumonia.      Impression:      Impression:  1.Stable moderate right-sided pneumothorax.  2.Mild right perihilar atelectasis.  3.Left lung scarring.        Electronically Signed: Aron Santos MD    10/20/2024 10:32 PM EDT    Workstation ID: [id number]    XR Chest 1 View [[id number]] Collected: 10/20/24 1603     Updated: 10/20/24 1614    Narrative:      XR CHEST 1 VW    Date of Exam: 10/20/2024 3:27 PM EDT    Indication: Aspiration    Comparison: Chest radiograph dated 10/16/2024    Findings:  The cardiac silhouette is within normal limits. Pulmonary vascularity appears normal. There is a new 4 cm right-sided pneumothorax. There is no mediastinal shift. There is no pleural effusion. No acute osseous findings.      Impression:      Impression:  1. New 4 cm right-sided pneumothorax.    Findings were called to patient's nurse Scarlet at 4:12 p.m. on 10/20/2024. She will relay the results to Dr. Mahmood.      Electronically Signed: Monico Cosby    10/20/2024 4:12 PM EDT    Workstation ID: [id number]    XR Abdomen KUB [[id number]] Collected: 10/20/24 1453     Updated: 10/20/24 1500    Narrative:      XR ABDOMEN KUB    Date of Exam: 10/20/2024 2:33 PM EDT    Indication: ng placement    Comparison: None available.    Findings:  An enteric tube is present, which appears to follow  the course of the right mainstem bronchus, projecting into the right medial lower thorax.     There is radiopaque contrast within the colon.         Impression:      Impression:  Enteric tube within the right mainstem bronchus.    These findings were reported to the patient's nurse on 10/20/2024 at approximately 3:00 p.m. eastern standard time. She received the findings and reported that the enteric tube has already been removed.      Electronically Signed: Jennifer Simpson MD    10/20/2024 2:58 PM EDT    Workstation ID: [id number]    XR Abdomen KUB [[id number]] Collected: 10/20/24 0252     Updated: 10/20/24 0307    Narrative:      XR ABDOMEN KUB    Date of Exam: 10/20/2024 2:36 AM EDT    Indication: NG tube, possible aspiration    Comparison: 10/19/2024.    Findings:  Feeding tube tip is in the stomach. No distended bowel. Lung bases are clear.      Impression:      Impression:  Feeding tube tip is in the stomach.        Electronically Signed: Aron Santos MD    10/20/2024 3:05 AM EDT    Workstation ID: [id number]    XR Abdomen KUB [[id number]] Collected: 10/19/24 1748     Updated: 10/19/24 1751    Narrative:      XR ABDOMEN KUB    Date of Exam: 10/19/2024 5:32 PM EDT    Indication: NG Tube    Comparison: None available.    Findings:  There is oral contrast noted within several small bowel loops related to prior swallow exam. Esophagogastric tube tip is below the diaphragm overlying the proximal stomach likely in the mid gastric body. Nonspecific bowel gas pattern.      Impression:      Impression:  Esophagogastric tube tip overlies the proximal stomach.        Electronically Signed: Lico Mills MD    10/19/2024 5:49 PM EDT    Workstation ID: [id number]    FL Video Swallow With Speech Single Contrast [[id number]] Resulted: 10/19/24 1334     Updated: 10/19/24 1334    Narrative:      This procedure was auto-finalized with no dictation required.    CT Angiogram Carotids [[id number]] Collected: 10/18/24 1204     Updated:  10/18/24 1303    Narrative:      CT ANGIOGRAM CAROTIDS    Date of Exam: 10/18/2024 6:15 AM EDT    Indication: Questionably altered, dysarthric, stroke evaluation, particular focus on vertebrobasilar system.    Comparison: None available.    Technique: CTA of the neck was performed before and after the uneventful intravenous administration of iodinated contrast. Reconstructed coronal and sagittal images were also obtained. In addition, a 3-D volume rendered image was created for   interpretation. Automated exposure control and iterative reconstruction methods were used.    Findings:  Nonvascular findings-visualized portions of the upper lungs demonstrate patchy areas of airspace disease bilaterally. In addition, there is apical pleural thickening, most notably on the right. Degenerative changes are noted throughout the spine   including postoperative changes in the lower cervical region. The visualized paranasal sinuses appear aerated.    Vascular findings-the ascending aorta is patent and mildly aneurysmal measuring 4.1 cm in maximum AP dimension on axial images. The great vessels are patent with some calcific ostial disease, notably involving the left subclavian artery origin. Both   vertebral arteries are widely patent throughout their entire courses, as is the basilar artery.   Both common carotid arteries demonstrate mild tortuosity but are free of any significant obstruction. Mild calcific plaquing is present in the region of each carotid bulb, however, no angiographically significant stenosis is demonstrated on either side   with application of NASCET criteria. Both external carotid arteries are widely patent. The cervical internal carotid arteries are patent throughout their courses as well. The intracranial aspect of each internal carotid artery is also patent including   each carotid siphon. Limited inclusion of the cerebral vasculature appears grossly unremarkable. In particular, the posterior cerebral  arteries appear patent bilaterally.      Impression:      Impression:    1. Widely patent vertebral arteries bilaterally as well as the basilar artery. Both posterior cerebral arteries appear patent.  2. Mild calcific plaquing, bilateral carotid bulbs, without evidence of angiographically significant stenosis on either side, with application of NASCET criteria to this examination.  3. Additional findings, both vascular and nonvascular, as described above in detail.      Electronically Signed: Chacha Chu MD    10/18/2024 12:19 PM EDT    Workstation ID: [id number]            Assessment & Plan   -Dysphagia - tight cricopharyngeal muscles versus confusion versus stricture  -Altered mental status-baseline dementia plus UTI  -UTI  -Normocytic anemia  -Elevated alkaline phosphatase    PLAN:  Patient is a 69-year-old male with a history of dementia and alcohol abuse, who presented to [facility] ED from [facility] unit for multiple unwitnessed falls.  GI was consulted for a failed swallowing eval with concern for tight cricopharyngeal muscle.  Patient has had pulled out 3 different NG tubes during this admission.  Patient was reportedly eating well prior to falls.  Confusion is thought to be due to UTI.    Await recovery of mental status and reassess dysphagia.  May consider EGD with dilation if swallowing if not improved when mental status returns to baseline.  Treatment of UTI per primary care team  Hemoglobin 9.6 on 10/20/21.  Continue to monitor H&H and transfuse as needed.  Alkaline phosphatase on 10/16/2020 289.  Will check GGT.      Electronically signed by NANCY Yu, 10/21/24, 11:02 AM EDT.

## 2024-10-21 NOTE — PROGRESS NOTES
[facility] MEDICINE SERVICE  DAILY PROGRESS NOTE    NAME: Shahram Chaney  : 1955  MRN: [id number]      LOS: 4 days     PROVIDER OF SERVICE: Daniel Hodge MD    Chief Complaint: AMS (altered mental status)    Subjective:     Interval History:  History taken from: patient    No acute overnight events. Per nursing report patient had iatrogenic injury while placing NGT a day prior, Ordered CXR, pulm consulted, otherwise patient is disoriented.       Review of Systems:   Review of Systems Unable to obtain     Objective:     Vital Signs  Temp:  [98.2 °F (36.8 °C)-98.9 °F (37.2 °C)] 98.8 °F (37.1 °C)  Heart Rate:  [] 89  Resp:  [11-22] 13  BP: (148-177)/() 150/101  Flow (L/min) (Oxygen Therapy):  [2-15] 4   Body mass index is 24.07 kg/m².    Physical Exam  General Appearance:  Awake, Disoriented   Head:  Atraumatic normocephalic   Eyes:        No sclera icterus   Neck: Normal R   Pulm: Decreased breath sounds   Cardio: Tachycardiac during my eval, rhythm regular   Extremities: No remarkable edema on ble   Abdomen: Soft non tender       Musculoskeletal: Moves all extremities   Neuro: disoriented               Scheduled Meds   amantadine, 100 mg, Nasogastric, Q12H  Barium Sulfate, 1 teaspoon(s), Oral, Once in imaging  barium sulfate, 50 mL, Oral, Once in imaging  barium sulfate, 50 mL, Oral, Once in imaging  cefTRIAXone, 1,000 mg, Intravenous, Q24H  mirtazapine, 7.5 mg, Nasogastric, Nightly       PRN Meds     acetaminophen **OR** acetaminophen **OR** acetaminophen    aluminum-magnesium hydroxide-simethicone    senna-docusate sodium **AND** polyethylene glycol **AND** bisacodyl **AND** bisacodyl    Calcium Replacement - Follow Nurse / BPA Driven Protocol    dextrose    dextrose    glucagon (human recombinant)    hydrALAZINE    Magnesium Standard Dose Replacement - Follow Nurse / BPA Driven Protocol    melatonin    nitroglycerin    ondansetron ODT **OR** [DISCONTINUED] ondansetron    Phosphorus  Replacement - Follow Nurse / BPA Driven Protocol    Potassium Replacement - Follow Nurse / BPA Driven Protocol    sodium chloride    ziprasidone (GEODON) 20 mg in sterile water (preservative free) 1 mL injection   Infusions  dextrose, 75 mL/hr, Last Rate: 75 mL/hr (10/21/24 1016)          Diagnostic Data    Results from last 7 days   Lab Units 10/21/24  0934 10/20/24  2257 10/17/24  0416 10/16/24  1103   WBC 10*3/mm3  --  6.25   < > 8.39   HEMOGLOBIN g/dL  --  9.6*   < > 9.2*   HEMATOCRIT %  --  30.6*   < > 29.9*   PLATELETS 10*3/mm3  --  228   < > 226   GLUCOSE mg/dL  --  97   < > 68   CREATININE mg/dL  --  0.64*   < > 1.14   BUN mg/dL  --  6*   < > 10   SODIUM mmol/L  --  142   < > 147*   POTASSIUM mmol/L 3.8 3.5   < > 4.1   AST (SGOT) U/L  --   --   --  24   ALT (SGPT) U/L  --   --   --  18   ALK PHOS U/L  --   --   --  289*   BILIRUBIN mg/dL  --   --   --  1.1   ANION GAP mmol/L  --  10.4   < > 12.2    < > = values in this interval not displayed.       XR Chest 1 View    Result Date: 10/21/2024  Impression: Increasing now moderate to large right pneumothorax. Findings communicated with ordering provider Dr. Hodge via epic secure chat at 11:25 a.m. 10/21/2024. Electronically Signed: Joey Tompkins MD  10/21/2024 11:27 AM EDT  Workstation ID: [id number]    XR Abdomen KUB    Result Date: 10/21/2024  Impression: No acute abdominal abnormality. Electronically Signed: Aron Santos MD  10/21/2024 6:54 AM EDT  Workstation ID: [id number]    XR Chest 1 View    Result Date: 10/20/2024  Impression: 1.Stable moderate right-sided pneumothorax. 2.Mild right perihilar atelectasis. 3.Left lung scarring. Electronically Signed: Aron Santos MD  10/20/2024 10:32 PM EDT  Workstation ID: [id number]    XR Chest 1 View    Result Date: 10/20/2024  Impression: 1. New 4 cm right-sided pneumothorax. Findings were called to patient's nurse Scarlet at 4:12 p.m. on 10/20/2024. She will relay the results to Dr. Mahmood. Electronically Signed:  Monico Alea  10/20/2024 4:12 PM EDT  Workstation ID: [id number]    XR Abdomen KUB    Result Date: 10/20/2024  Impression: Enteric tube within the right mainstem bronchus. These findings were reported to the patient's nurse on 10/20/2024 at approximately 3:00 p.m. eastern standard time. She received the findings and reported that the enteric tube has already been removed. Electronically Signed: Jennifer Simpson MD  10/20/2024 2:58 PM EDT  Workstation ID: [id number]    XR Abdomen KUB    Result Date: 10/20/2024  Impression: Feeding tube tip is in the stomach. Electronically Signed: Aron Santos MD  10/20/2024 3:05 AM EDT  Workstation ID: [id number]    XR Abdomen KUB    Result Date: 10/19/2024  Impression: Esophagogastric tube tip overlies the proximal stomach. Electronically Signed: Lico Mills MD  10/19/2024 5:49 PM EDT  Workstation ID: [id number]       I reviewed the patient's new clinical results.    Assessment/Plan:   A 69 y.o. old male patient with PMH of abdominal aortic aneurysm and alcohol dependence anxiety hypertension monoclonal gammopathy psychosis anemia GERD presents to the hospital with complaints of altered mental status physical weakness lethargy for past few days.     Active and Resolved Problems  Active Hospital Problems    Diagnosis  POA    **AMS (altered mental status) [R41.82]  Yes    Moderate malnutrition [E44.0]  Yes    Altered mental status [R41.82]  Yes      Resolved Hospital Problems   No resolved problems to display.       Plan:   -MRI negative for stroke, CTA head/neck: neg for acute pathologies  -On ceftriaxone for UTI, on day 3, Urine Cx: pan sensitive , blood cx neg, abx also cover possible mastoiditis   -GI following for dysphagia, currently awaiting further eval given his mental status, had PTX while placing NGT a day prior, continue D5 for now, monitor blood glucose   -Pulm following for PTX, s/p chest tube placement this am on 10/20/24, daily CXR  -Give diuldad for pain .25, PRN  Labetalol 10 PRN, however HTN could be due to pain given recent chest tube placement   -Will review previous labs for encephalopathy work up  -Neuro reccs noted  -AM Labs    VTE Prophylaxis:  Mechanical VTE prophylaxis orders are present.         Code status is   There are no questions and answers to display.       Plan for disposition:Chest tube, encephalopathy     Time: 30 minutes    Part of this note may be an electronic transcription/translation of spoken language to printed text using the Dragon Dictation System.    Signature: Electronically signed by Daniel Hodge MD, 10/21/24, 13:18 EDT.  [facility] Hospitalist Team

## 2024-10-21 NOTE — PLAN OF CARE
"Goal Outcome Evaluation:      Assessment: Shahram Chaney presents with ADL impairments affecting function including balance, cognition, coordination, endurance / activity tolerance, grasp, pain, postural / trunk control, shortness of breath, and strength. Demonstrated functioning below baseline abilities indicate the need for continued skilled intervention while inpatient. Tolerating session today without incident. Pt has new pneumothorax & CT, NG feeding tube could not be placed. Pt is intermittently agitated and consistently confused. With OT restraints were able to be removed for pt to sit EOB for 5-10 minutes engaged in some grooming activities, then stood at RW to side step to HOB. Pt fatiguing very quickly. Medically complex. Will need rehab at [facility] pending medical planning and potential for palliative care decisions. Will continue to follow and progress as tolerated.      Plan/Recommendations:   Moderate Intensity Therapy recommended post-acute care. This is recommended as therapy feels the patient would require 3-4 days per week and wouldn't tolerate \"3 hour daily\" rehab intensity. SNF would be the preferred choice. If the patient does not agree to SNF, arrange HH or OP depending on home bound status. If patient is medically complex, consider LTACH.. Pt requires no DME at discharge.      Pt desires Skilled Rehab placement at discharge. Pt cooperative; agreeable to therapeutic recommendations and plan of care.   "

## 2024-10-21 NOTE — PLAN OF CARE
Problem: Adult Inpatient Plan of Care  Goal: Plan of Care Review  Outcome: Progressing  Goal: Patient-Specific Goal (Individualized)  Outcome: Progressing  Goal: Absence of Hospital-Acquired Illness or Injury  Outcome: Progressing  Intervention: Identify and Manage Fall Risk  Recent Flowsheet Documentation  Taken 10/21/2024 1400 by Miranda Chandra RN  Safety Promotion/Fall Prevention:   activity supervised   assistive device/personal items within reach   clutter free environment maintained   fall prevention program maintained   nonskid shoes/slippers when out of bed   room organization consistent   safety round/check completed  Taken 10/21/2024 1156 by Miranda Chandra RN  Safety Promotion/Fall Prevention:   activity supervised   assistive device/personal items within reach   clutter free environment maintained   fall prevention program maintained   nonskid shoes/slippers when out of bed   room organization consistent   safety round/check completed  Taken 10/21/2024 1000 by Miranda Chandra RN  Safety Promotion/Fall Prevention:   activity supervised   assistive device/personal items within reach   clutter free environment maintained   fall prevention program maintained   nonskid shoes/slippers when out of bed   room organization consistent   safety round/check completed  Taken 10/21/2024 0908 by Miranda Chandra RN  Safety Promotion/Fall Prevention:   activity supervised   assistive device/personal items within reach   clutter free environment maintained   fall prevention program maintained   nonskid shoes/slippers when out of bed   room organization consistent   safety round/check completed  Taken 10/21/2024 0800 by Miranda Chandra RN  Safety Promotion/Fall Prevention:   activity supervised   assistive device/personal items within reach   clutter free environment maintained   fall prevention program maintained   nonskid shoes/slippers when out of bed   room organization consistent   safety  round/check completed  Intervention: Prevent Skin Injury  Recent Flowsheet Documentation  Taken 10/21/2024 0908 by Miranda Chandra RN  Skin Protection: transparent dressing maintained  Intervention: Prevent and Manage VTE (Venous Thromboembolism) Risk  Recent Flowsheet Documentation  Taken 10/21/2024 0908 by Miranda Chandra RN  VTE Prevention/Management:   bilateral   SCDs (sequential compression devices) off   patient refused intervention  Intervention: Prevent Infection  Recent Flowsheet Documentation  Taken 10/21/2024 1400 by Miranda Chandra RN  Infection Prevention: hand hygiene promoted  Taken 10/21/2024 1156 by Miranda Chandra RN  Infection Prevention: hand hygiene promoted  Taken 10/21/2024 1000 by Miranda Chandra RN  Infection Prevention: hand hygiene promoted  Taken 10/21/2024 0908 by Miranda Chandra RN  Infection Prevention: hand hygiene promoted  Taken 10/21/2024 0800 by Miranda Chandra RN  Infection Prevention: hand hygiene promoted  Goal: Optimal Comfort and Wellbeing  Outcome: Progressing  Intervention: Provide Person-Centered Care  Recent Flowsheet Documentation  Taken 10/21/2024 1156 by Miranda Chandra RN  Trust Relationship/Rapport:   care explained   thoughts/feelings acknowledged  Taken 10/21/2024 0908 by Miranda Chandra RN  Trust Relationship/Rapport:   care explained   thoughts/feelings acknowledged  Goal: Readiness for Transition of Care  Outcome: Progressing     Problem: Fall Injury Risk  Goal: Absence of Fall and Fall-Related Injury  Outcome: Progressing  Intervention: Identify and Manage Contributors  Recent Flowsheet Documentation  Taken 10/21/2024 1400 by Miranda Chandra RN  Medication Review/Management: medications reviewed  Taken 10/21/2024 1156 by Miranda Chandra RN  Medication Review/Management: medications reviewed  Taken 10/21/2024 1000 by Miranda Chandra RN  Medication Review/Management: medications reviewed  Taken 10/21/2024 0908 by  Miranda Chandra RN  Medication Review/Management: medications reviewed  Taken 10/21/2024 0800 by Miranda Chandra RN  Medication Review/Management: medications reviewed  Intervention: Promote Injury-Free Environment  Recent Flowsheet Documentation  Taken 10/21/2024 1400 by Miranda Chandra RN  Safety Promotion/Fall Prevention:   activity supervised   assistive device/personal items within reach   clutter free environment maintained   fall prevention program maintained   nonskid shoes/slippers when out of bed   room organization consistent   safety round/check completed  Taken 10/21/2024 1156 by Miranda Chandra RN  Safety Promotion/Fall Prevention:   activity supervised   assistive device/personal items within reach   clutter free environment maintained   fall prevention program maintained   nonskid shoes/slippers when out of bed   room organization consistent   safety round/check completed  Taken 10/21/2024 1000 by Miranda Chandra RN  Safety Promotion/Fall Prevention:   activity supervised   assistive device/personal items within reach   clutter free environment maintained   fall prevention program maintained   nonskid shoes/slippers when out of bed   room organization consistent   safety round/check completed  Taken 10/21/2024 0908 by Miranda Chandra RN  Safety Promotion/Fall Prevention:   activity supervised   assistive device/personal items within reach   clutter free environment maintained   fall prevention program maintained   nonskid shoes/slippers when out of bed   room organization consistent   safety round/check completed  Taken 10/21/2024 0800 by Miranda Chandra RN  Safety Promotion/Fall Prevention:   activity supervised   assistive device/personal items within reach   clutter free environment maintained   fall prevention program maintained   nonskid shoes/slippers when out of bed   room organization consistent   safety round/check completed     Problem: Skin Injury Risk  Increased  Goal: Skin Health and Integrity  Outcome: Progressing  Intervention: Optimize Skin Protection  Recent Flowsheet Documentation  Taken 10/21/2024 0908 by Miranda Chandra RN  Activity Management: bedrest  Pressure Reduction Techniques:   frequent weight shift encouraged   weight shift assistance provided  Pressure Reduction Devices:   positioning supports utilized   pressure-redistributing mattress utilized  Skin Protection: transparent dressing maintained     Problem: Confusion Acute  Goal: Optimal Cognitive Function  Outcome: Progressing  Intervention: Minimize Contributing Factors  Recent Flowsheet Documentation  Taken 10/21/2024 0908 by Miranda Chandra RN  Sensory Stimulation Regulation: care clustered  Reorientation Measures: clock in view  Communication Support Strategies: active listening utilized     Problem: Seizure, Active Management  Goal: Absence of Seizure/Seizure-Related Injury  Outcome: Progressing  Intervention: Prevent Seizure-Related Injury  Recent Flowsheet Documentation  Taken 10/21/2024 0908 by Miranda Chandra RN  Sensory Stimulation Regulation: care clustered     Problem: Malnutrition  Goal: Improved Nutritional Intake  Outcome: Progressing  Goal: Improved Nutritional Intake  Outcome: Progressing     Problem: Electrolyte Imbalance  Goal: Electrolyte Balance  Outcome: Progressing  Intervention: Monitor and Manage Electrolyte Imbalance  Recent Flowsheet Documentation  Taken 10/21/2024 0908 by Miranda Chandra RN  Fluid/Electrolyte Management: fluids provided     Problem: Hospitalized Older Adult  Goal: Optimal Cognitive Function  Outcome: Progressing  Intervention: Minimize Contributing Factors  Recent Flowsheet Documentation  Taken 10/21/2024 0908 by Miranda Chandra RN  Sensory Stimulation Regulation: care clustered  Reorientation Measures: clock in view  Goal: Effective Bowel Elimination  Outcome: Progressing  Goal: Optimal Coping  Outcome: Progressing  Intervention:  Promote Psychosocial Wellbeing  Recent Flowsheet Documentation  Taken 10/21/2024 0908 by Miranda Chandra RN  Family/Support System Care: self-care encouraged  Goal: Fluid and Electrolyte Balance  Outcome: Progressing  Intervention: Monitor and Manage Fluid and Electrolyte Balance  Recent Flowsheet Documentation  Taken 10/21/2024 0908 by Miranda Chandra RN  Fluid/Electrolyte Management: fluids provided  Goal: Optimal Functional Ability  Outcome: Progressing  Intervention: Promote Functional Ability  Recent Flowsheet Documentation  Taken 10/21/2024 0908 by Miranda Chandra RN  Activity Management: bedrest  Goal: Improved Oral Intake  Outcome: Progressing  Goal: Adequate Sleep/Rest  Outcome: Progressing  Goal: Effective Urinary Elimination  Outcome: Progressing  Intervention: Promote Urinary Continence  Recent Flowsheet Documentation  Taken 10/21/2024 0908 by Miranda Chandra RN  Urinary Elimination Promotion: absorbent pad/diaper use encouraged     Problem: Wound  Goal: Optimal Coping  Outcome: Progressing  Intervention: Support Patient and Family Response  Recent Flowsheet Documentation  Taken 10/21/2024 0908 by Miranda Chandra RN  Family/Support System Care: self-care encouraged  Goal: Optimal Functional Ability  Outcome: Progressing  Intervention: Optimize Functional Ability  Recent Flowsheet Documentation  Taken 10/21/2024 0908 by Miranda Chandra RN  Activity Management: bedrest  Goal: Absence of Infection Signs and Symptoms  Outcome: Progressing  Goal: Improved Oral Intake  Outcome: Progressing  Goal: Optimal Pain Control and Function  Outcome: Progressing  Goal: Skin Health and Integrity  Outcome: Progressing  Intervention: Optimize Skin Protection  Recent Flowsheet Documentation  Taken 10/21/2024 0908 by Miranda Chandra RN  Activity Management: bedrest  Pressure Reduction Techniques:   frequent weight shift encouraged   weight shift assistance provided  Pressure Reduction Devices:    positioning supports utilized   pressure-redistributing mattress utilized  Goal: Optimal Wound Healing  Outcome: Progressing     Problem: Glycemic Control Impaired  Goal: Blood Glucose Level Within Target Range  Outcome: Progressing  Goal: Minimize Hypoglycemia Risk  Outcome: Progressing  Intervention: Minimize and Manage Hypoglycemia  Recent Flowsheet Documentation  Taken 10/21/2024 0908 by Miranda Chandra RN  Hypoglycemia Management: blood glucose monitored     Problem: Restraint, Nonviolent  Goal: Absence of Harm or Injury  Outcome: Progressing  Intervention: Implement Least Restrictive Safety Strategies  Recent Flowsheet Documentation  Taken 10/21/2024 1400 by Miranda Chandra RN  Medical Device Protection:   IV pole/bag removed from visual field   tubing secured  Diversional Activities: television  Taken 10/21/2024 1321 by Miranda Chandra RN  Medical Device Protection:   IV pole/bag removed from visual field   tubing secured  Diversional Activities: television  Taken 10/21/2024 1200 by Miranda Chandra RN  Medical Device Protection:   IV pole/bag removed from visual field   tubing secured  Diversional Activities: television  Taken 10/21/2024 1000 by Miranda Chandra RN  Medical Device Protection:   IV pole/bag removed from visual field   tubing secured  Diversional Activities: television  Taken 10/21/2024 0908 by Miranda Chandra RN  Diversional Activities: television  Taken 10/21/2024 0800 by Miranda Chandra RN  Medical Device Protection:   IV pole/bag removed from visual field   tubing secured  Diversional Activities: television  Intervention: Protect Dignity, Rights and Personal Wellbeing  Recent Flowsheet Documentation  Taken 10/21/2024 1156 by Miranda Chandra RN  Trust Relationship/Rapport:   care explained   thoughts/feelings acknowledged  Taken 10/21/2024 0908 by Miranda Chandra RN  Trust Relationship/Rapport:   care explained   thoughts/feelings  acknowledged  Intervention: Protect Skin and Joint Integrity  Recent Flowsheet Documentation  Taken 10/21/2024 0908 by Miranda Chandra, RN  Skin Protection: transparent dressing maintained  Range of Motion: active ROM (range of motion) encouraged   Goal Outcome Evaluation:

## 2024-10-21 NOTE — THERAPY RE-EVALUATION
Acute Care - Speech Language Pathology   Swallow Re-Evaluation  [facility]     Patient Name: Shahram Chaney  : 1955  MRN: [id number]  Today's Date: 10/21/2024               Admit Date: 10/16/2024    Visit Dx:     ICD-10-CM ICD-9-CM   1. Altered mental status, unspecified altered mental status type  R41.82 780.97     Patient Active Problem List   Diagnosis    AMS (altered mental status)    Altered mental status    Moderate malnutrition     History reviewed. No pertinent past medical history.  History reviewed. No pertinent surgical history.    SLP Recommendation and Plan     SLP Diet Recommendation: NPO, temporary alternate methods of nutrition/hydration, other (see comments) (FWP) (10/21/24 1500)  Recommended Precautions and Strategies: general aspiration precautions (10/21/24 1500)  SLP Rec. for Method of Medication Administration: meds via alternate route (10/21/24 1500)     Monitor for Signs of Aspiration: yes, notify SLP if any concerns (10/21/24 1500)  Recommended Diagnostics: reassess via clinical swallow evaluation (10/21/24 1500)           Therapy Frequency (Swallow): 3 days per week, 4 days per week, 5 days per week (10/21/24 1500)  Predicted Duration Therapy Intervention (Days): until discharge (10/21/24 1500)  Oral Care Recommendations: Before ice/water, Oral Care BID/PRN (10/21/24 1500)                        Treatment Assessment (SLP): clinical signs of, oral dysphagia, pharyngeal dysphagia (10/21/24 1500)     Plan for Continued Treatment (SLP): continue treatment per plan of care (10/21/24 1500)                              Epiglottic deflection impacted by hardware              SWALLOW EVALUATION (Last 72 Hours)       SLP Adult Swallow Evaluation       Row Name 10/21/24 1500       Rehab Evaluation    Document Type re-evaluation  -LF    Subjective Information complains of;pain  -LF    Patient Observations alert;cooperative;agree to therapy  -LF    Patient Effort fair  -LF    Comment Pt seen for skilled  ST targeting dysphagia this date. Upon entry, pt awake and alert in bed w/ sitter at bedside. Pt still presents w/ some confusion but was able to appropriately participate in PO trials. Pt assessed w/ trials of thin liquids by spoon and ice chips. No further trials/consistencies assessed d/t clinical s/s of aspiration at bedside. All trials fed by SLP. Oral confusion w/ spoon w/ reduced labial seal. No anterior loss noted. Functional manipulation of ice chips. Multiple audible swallows and cough reaction w/ all trials of thin liquids by spoon. No coughing or throat clearing observed w/ ice chips, however, multiple audible swallows noted. Based off VFSS findings and clinical s/s of difficulty observed at bedside this date, recommend pt remain NPO w/ [place] Water Protocol. Pt would benefit from alt nutrition as pt has been NPO for an extended period of time and suspect swallow functioning would be impacted by poor nutrition. Additionally question the impact of pt's anatomy in the setting of cervical spine hardware (see photos above from VFSS). Pt is currently not a candidate for a repeat VFSS this date d/t current clinical presentation. ST will continue to follow for ongoing re-evaluation w/ further recs as indicated.  -LF    Symptoms Noted During/After Treatment increased pain  -LF          SLP Treatment Clinical Impressions    Treatment Assessment (SLP) clinical signs of;oral dysphagia;pharyngeal dysphagia  -    Barriers to Overall Progress (SLP) --    Plan for Continued Treatment (SLP) continue treatment per plan of care  -    Care Plan Review --       Recommendations    Therapy Frequency (Swallow) 3 days per week;4 days per week;5 days per week  -    Predicted Duration Therapy Intervention (Days) until discharge  -    SLP Diet Recommendation NPO;temporary alternate methods of nutrition/hydration;other (see comments)  FWP  -    Recommended Diagnostics reassess via clinical swallow evaluation  -     Recommended Precautions and Strategies general aspiration precautions  -LF    Oral Care Recommendations Before ice/water;Oral Care BID/PRN  -LF    SLP Rec. for Method of Medication Administration meds via alternate route  -LF    Monitor for Signs of Aspiration yes;notify SLP if any concerns  -LF    Anticipated Discharge Disposition (SLP) --       Swallow Goals (SLP)    Swallow LTGs Swallow Long Term Goal (free text)  -LF    Swallow STGs diet tolerance goal selection (SLP)  -LF    Diet Tolerance Goal Selection (SLP) Patient will tolerate trials of  -LF       (LTG) Swallow    (LTG) Swallow Patient will participate in ongoing assessment of swallow, including reevaluation of swallow clinically and/or including instrumental assessment of swallow if indicated, to further assess swallow function in anticipation of initiation of PO diet.  -LF    Hall (Swallow Long Term Goal) with minimal cues (75-90% accuracy)  -LF    Time Frame (Swallow Long Term Goal) 1 week  -LF    Progress/Outcomes (Swallow Long Term Goal) goal ongoing  -LF    Comment (Swallow Long Term Goal) See above  -LF       (STG) Patient will tolerate trials of    Consistencies Trialed (Tolerate trials) thin liquids;pureed textures  -LF    Desired Outcome (Tolerate trials) with adequate oral prep/transit/clearance;without signs of distress;without signs/symptoms of aspiration  -LF    Hall (Tolerate trials) with minimal cues (75-90% accuracy)  -LF    Time Frame (Tolerate trials) by discharge  -LF    Progress/Outcomes (Tolerate trials) progress slower than expected  -LF       (STG) Swallow 1    (STG) Swallow 1 The patient will participate in ongoing assessment of swallow, including re-evaluation clinically and/or including instrumental assessment of swallow if indicated, to further assess swallow function in anticipation to initiate a PO diet  -LF    Hall (Swallow Short Term Goal 1) with minimal cues (75-90% accuracy)  -LF    Time Frame  (Swallow Short Term Goal 1) by discharge  -LF    Progress/Outcomes (Swallow Short Term Goal 1) goal ongoing  -LF              User Key  (r) = Recorded By, (t) = Taken By, (c) = Cosigned By      Initials Name Effective Dates    LF Nita Jay, BARON 06/16/21 -     Joe Whiting SLP 04/22/24 -                     EDUCATION  The patient has been educated in the following areas:   Dysphagia (Swallowing Impairment) Oral Care/Hydration NPO rationale.                  Time Calculation:                BARON Arauz  10/21/2024

## 2024-10-21 NOTE — SIGNIFICANT NOTE
10/21/24 1359   OTHER   Discipline physical therapist   Rehab Time/Intention   Session Not Performed other (see comments)  (Agressive toward staff this AM, remains on restraints this PM. PT to f/u as able)   Recommendation   PT - Next Appointment 10/22/24

## 2024-10-21 NOTE — NURSING NOTE
At this time pt. Remains agitated. Pt. Showing signs of aggression toward staff. Pt. Continuing to throw feet off of bed and restlessness. Pt. Continues to have hallucinations.

## 2024-10-21 NOTE — THERAPY TREATMENT NOTE
Subjective: Pt agreeable to therapeutic plan of care.  Cognition: disoriented to Place, Time, and Situation, memory: Impaired short term, arousal/alertness: Alert, Attentive, and Confused, safety/judgement: limited, and awareness of deficits: poor awareness of safety precaution and poor awareness of defits    Objective: overhears beeps of monitor as baby crying. Has no recollection of aggressive events. 2-pt restraints w/ sitter as well present. Sitter states pt is most unable to attend to cues & reality when he needs to urinate.    Precautions - fall, CT, O2, restraints    Bed Mobility: Min-A   Functional Transfers: Mod-A and with rolling walker     Balance: supported, static, with UE support, and standing Min-A  Functional Ambulation: Mod-A and with rolling walker. Side-stepped to HOB    Grooming: Mod-A  ADL Position: edge of bed sitting  ADL Comments: completed oral care and face washing. Fatigued very quickly and required postural support when using arms for ADL task.    Feeding: Dependent  ADL Comments: NPO. Esophageal stricture is severe and NG tube was not able to be placed.    Pain: 4 VAS  Location: rt back of shld. Possibly referred pain from CT insertion  Interventions for pain: Repositioned, RN notified, Increased Activity, and Therapeutic Presence  Education: Provided education on the importance of mobility in the acute care setting, Verbal/Tactile Cues, ADL training, and Transfer Training      Assessment: Shahram Chaney presents with ADL impairments affecting function including balance, cognition, coordination, endurance / activity tolerance, grasp, pain, postural / trunk control, shortness of breath, and strength. Demonstrated functioning below baseline abilities indicate the need for continued skilled intervention while inpatient. Tolerating session today without incident. Will continue to follow and progress as tolerated.     Plan/Recommendations:   Moderate Intensity Therapy recommended post-acute care.  "This is recommended as therapy feels the patient would require 3-4 days per week and wouldn't tolerate \"3 hour daily\" rehab intensity. SNF would be the preferred choice. If the patient does not agree to SNF, arrange HH or OP depending on home bound status. If patient is medically complex, consider LTACH.. Pt requires no DME at discharge.     Pt desires Skilled Rehab placement at discharge. Pt cooperative; agreeable to therapeutic recommendations and plan of care.     Modified [place]: 4 = Moderately severe disability (Unable to attend to own bodily needs without assistance, and unable to walk unassisted)     Post-Tx Position: Supine with HOB Elevated, Staff Present, Alarms activated, and Call light and personal items within reach  PPE: gloves    "

## 2024-10-22 ENCOUNTER — APPOINTMENT (OUTPATIENT)
Dept: ULTRASOUND IMAGING | Facility: HOSPITAL | Age: 69
End: 2024-10-22
Payer: MEDICARE

## 2024-10-22 ENCOUNTER — INPATIENT HOSPITAL (OUTPATIENT)
Dept: URBAN - METROPOLITAN AREA HOSPITAL 84 | Facility: HOSPITAL | Age: 69
End: 2024-10-22

## 2024-10-22 ENCOUNTER — INPATIENT HOSPITAL (OUTPATIENT)
Age: 69
End: 2024-10-22

## 2024-10-22 ENCOUNTER — APPOINTMENT (OUTPATIENT)
Dept: GENERAL RADIOLOGY | Facility: HOSPITAL | Age: 69
End: 2024-10-22

## 2024-10-22 DIAGNOSIS — F03.90 UNSPECIFIED DEMENTIA, UNSPECIFIED SEVERITY, WITHOUT BEHAVIOR: ICD-10-CM

## 2024-10-22 DIAGNOSIS — R74.8 ABNORMAL LEVELS OF OTHER SERUM ENZYMES: ICD-10-CM

## 2024-10-22 DIAGNOSIS — D64.9 ANEMIA, UNSPECIFIED: ICD-10-CM

## 2024-10-22 DIAGNOSIS — R13.10 DYSPHAGIA, UNSPECIFIED: ICD-10-CM

## 2024-10-22 LAB
ALBUMIN SERPL-MCNC: 2.7 G/DL (ref 3.5–5.2)
ALBUMIN/GLOB SERPL: 0.8 G/DL
ALP SERPL-CCNC: 207 U/L (ref 39–117)
ALPHA1 GLOB MFR UR ELPH: 277 MG/DL (ref 90–200)
ALT SERPL W P-5'-P-CCNC: 8 U/L (ref 1–41)
ANION GAP SERPL CALCULATED.3IONS-SCNC: 11 MMOL/L (ref 5–15)
AST SERPL-CCNC: 18 U/L (ref 1–40)
BASOPHILS # BLD AUTO: 0.05 10*3/MM3 (ref 0–0.2)
BASOPHILS NFR BLD AUTO: 0.7 % (ref 0–1.5)
BILIRUB SERPL-MCNC: 0.6 MG/DL (ref 0–1.2)
BUN SERPL-MCNC: 5 MG/DL (ref 8–23)
BUN/CREAT SERPL: 7.6 (ref 7–25)
CALCIUM SPEC-SCNC: 8.8 MG/DL (ref 8.6–10.5)
CERULOPLASMIN SERPL-MCNC: 21 MG/DL (ref 16–31)
CHLORIDE SERPL-SCNC: 107 MMOL/L (ref 98–107)
CO2 SERPL-SCNC: 18 MMOL/L (ref 22–29)
CREAT SERPL-MCNC: 0.66 MG/DL (ref 0.76–1.27)
DEPRECATED RDW RBC AUTO: 51.9 FL (ref 37–54)
EGFRCR SERPLBLD CKD-EPI 2021: 100.9 ML/MIN/1.73
EOSINOPHIL # BLD AUTO: 0.2 10*3/MM3 (ref 0–0.4)
EOSINOPHIL NFR BLD AUTO: 2.7 % (ref 0.3–6.2)
ERYTHROCYTE [DISTWIDTH] IN BLOOD BY AUTOMATED COUNT: 14.9 % (ref 12.3–15.4)
FERRITIN SERPL-MCNC: 651 NG/ML (ref 30–400)
GLOBULIN UR ELPH-MCNC: 3.2 GM/DL
GLUCOSE BLDC GLUCOMTR-MCNC: 107 MG/DL (ref 70–105)
GLUCOSE BLDC GLUCOMTR-MCNC: 110 MG/DL (ref 70–105)
GLUCOSE BLDC GLUCOMTR-MCNC: 125 MG/DL (ref 70–105)
GLUCOSE BLDC GLUCOMTR-MCNC: 89 MG/DL (ref 70–105)
GLUCOSE SERPL-MCNC: 100 MG/DL (ref 65–99)
HAV IGM SERPL QL IA: NORMAL
HBV CORE IGM SERPL QL IA: NORMAL
HBV SURFACE AG SERPL QL IA: NORMAL
HCT VFR BLD AUTO: 30 % (ref 37.5–51)
HCV AB SER QL: NORMAL
HGB BLD-MCNC: 9.8 G/DL (ref 13–17.7)
IMM GRANULOCYTES # BLD AUTO: 0.04 10*3/MM3 (ref 0–0.05)
IMM GRANULOCYTES NFR BLD AUTO: 0.5 % (ref 0–0.5)
IRON 24H UR-MRATE: 13 MCG/DL (ref 59–158)
LYMPHOCYTES # BLD AUTO: 1.19 10*3/MM3 (ref 0.7–3.1)
LYMPHOCYTES NFR BLD AUTO: 16.2 % (ref 19.6–45.3)
MAGNESIUM SERPL-MCNC: 1.6 MG/DL (ref 1.6–2.4)
MCH RBC QN AUTO: 30.5 PG (ref 26.6–33)
MCHC RBC AUTO-ENTMCNC: 32.7 G/DL (ref 31.5–35.7)
MCV RBC AUTO: 93.5 FL (ref 79–97)
MONOCYTES # BLD AUTO: 1.1 10*3/MM3 (ref 0.1–0.9)
MONOCYTES NFR BLD AUTO: 15 % (ref 5–12)
NEUTROPHILS NFR BLD AUTO: 4.77 10*3/MM3 (ref 1.7–7)
NEUTROPHILS NFR BLD AUTO: 64.9 % (ref 42.7–76)
NRBC BLD AUTO-RTO: 0 /100 WBC (ref 0–0.2)
PHOSPHATE SERPL-MCNC: 2.8 MG/DL (ref 2.5–4.5)
PLATELET # BLD AUTO: 251 10*3/MM3 (ref 140–450)
PMV BLD AUTO: 11.4 FL (ref 6–12)
POTASSIUM SERPL-SCNC: 3.9 MMOL/L (ref 3.5–5.2)
PROT SERPL-MCNC: 5.9 G/DL (ref 6–8.5)
RBC # BLD AUTO: 3.21 10*6/MM3 (ref 4.14–5.8)
SODIUM SERPL-SCNC: 136 MMOL/L (ref 136–145)
WBC NRBC COR # BLD AUTO: 7.35 10*3/MM3 (ref 3.4–10.8)

## 2024-10-22 PROCEDURE — 99232 SBSQ HOSP IP/OBS MODERATE 35: CPT | Performed by: NURSE PRACTITIONER

## 2024-10-22 PROCEDURE — 86015 ACTIN ANTIBODY EACH: CPT | Performed by: NURSE PRACTITIONER

## 2024-10-22 PROCEDURE — 82728 ASSAY OF FERRITIN: CPT | Performed by: NURSE PRACTITIONER

## 2024-10-22 PROCEDURE — 82948 REAGENT STRIP/BLOOD GLUCOSE: CPT | Performed by: STUDENT IN AN ORGANIZED HEALTH CARE EDUCATION/TRAINING PROGRAM

## 2024-10-22 PROCEDURE — 86376 MICROSOMAL ANTIBODY EACH: CPT | Performed by: NURSE PRACTITIONER

## 2024-10-22 PROCEDURE — 92526 ORAL FUNCTION THERAPY: CPT

## 2024-10-22 PROCEDURE — 80053 COMPREHEN METABOLIC PANEL: CPT | Performed by: NURSE PRACTITIONER

## 2024-10-22 PROCEDURE — 82390 ASSAY OF CERULOPLASMIN: CPT | Performed by: NURSE PRACTITIONER

## 2024-10-22 PROCEDURE — 25010000002 LABETALOL 5 MG/ML SOLUTION: Performed by: STUDENT IN AN ORGANIZED HEALTH CARE EDUCATION/TRAINING PROGRAM

## 2024-10-22 PROCEDURE — 83735 ASSAY OF MAGNESIUM: CPT | Performed by: STUDENT IN AN ORGANIZED HEALTH CARE EDUCATION/TRAINING PROGRAM

## 2024-10-22 PROCEDURE — 80074 ACUTE HEPATITIS PANEL: CPT | Performed by: NURSE PRACTITIONER

## 2024-10-22 PROCEDURE — 76705 ECHO EXAM OF ABDOMEN: CPT

## 2024-10-22 PROCEDURE — 84100 ASSAY OF PHOSPHORUS: CPT | Performed by: STUDENT IN AN ORGANIZED HEALTH CARE EDUCATION/TRAINING PROGRAM

## 2024-10-22 PROCEDURE — 71045 X-RAY EXAM CHEST 1 VIEW: CPT

## 2024-10-22 PROCEDURE — 82103 ALPHA-1-ANTITRYPSIN TOTAL: CPT | Performed by: NURSE PRACTITIONER

## 2024-10-22 PROCEDURE — 97535 SELF CARE MNGMENT TRAINING: CPT

## 2024-10-22 PROCEDURE — 85025 COMPLETE CBC W/AUTO DIFF WBC: CPT | Performed by: NURSE PRACTITIONER

## 2024-10-22 PROCEDURE — 86038 ANTINUCLEAR ANTIBODIES: CPT | Performed by: NURSE PRACTITIONER

## 2024-10-22 PROCEDURE — 82948 REAGENT STRIP/BLOOD GLUCOSE: CPT

## 2024-10-22 PROCEDURE — 83540 ASSAY OF IRON: CPT | Performed by: NURSE PRACTITIONER

## 2024-10-22 PROCEDURE — 63710000001 ONDANSETRON ODT 4 MG TABLET DISPERSIBLE: Performed by: INTERNAL MEDICINE

## 2024-10-22 PROCEDURE — 25010000002 CEFTRIAXONE PER 250 MG: Performed by: STUDENT IN AN ORGANIZED HEALTH CARE EDUCATION/TRAINING PROGRAM

## 2024-10-22 PROCEDURE — 86381 MITOCHONDRIAL ANTIBODY EACH: CPT | Performed by: NURSE PRACTITIONER

## 2024-10-22 PROCEDURE — 97530 THERAPEUTIC ACTIVITIES: CPT

## 2024-10-22 PROCEDURE — 25010000002 HYDRALAZINE PER 20 MG: Performed by: INTERNAL MEDICINE

## 2024-10-22 PROCEDURE — 0 DEXTROSE 5 % SOLUTION: Performed by: STUDENT IN AN ORGANIZED HEALTH CARE EDUCATION/TRAINING PROGRAM

## 2024-10-22 RX ADMIN — ONDANSETRON 4 MG: 4 TABLET, ORALLY DISINTEGRATING ORAL at 12:49

## 2024-10-22 RX ADMIN — DEXTROSE MONOHYDRATE 75 ML/HR: 50 INJECTION, SOLUTION INTRAVENOUS at 17:10

## 2024-10-22 RX ADMIN — DEXTROSE MONOHYDRATE 75 ML/HR: 50 INJECTION, SOLUTION INTRAVENOUS at 02:53

## 2024-10-22 RX ADMIN — CEFTRIAXONE 1000 MG: 1 INJECTION, POWDER, FOR SOLUTION INTRAMUSCULAR; INTRAVENOUS at 09:54

## 2024-10-22 RX ADMIN — LABETALOL HYDROCHLORIDE 10 MG: 5 INJECTION, SOLUTION INTRAVENOUS at 21:06

## 2024-10-22 RX ADMIN — HYDRALAZINE HYDROCHLORIDE 10 MG: 20 INJECTION INTRAMUSCULAR; INTRAVENOUS at 00:42

## 2024-10-22 RX ADMIN — HYDRALAZINE HYDROCHLORIDE 10 MG: 20 INJECTION INTRAMUSCULAR; INTRAVENOUS at 17:37

## 2024-10-22 NOTE — PROGRESS NOTES
LOS: 5 days   Patient Care Team:  Zaki Fontanez MD as PCP - General (Family Medicine)      Subjective     Interval History:     Subjective: Patient remains confused, history is supplemented via chart review and bedside RN.  Patient follows commands and is cooperative.  Awaiting speech therapy consult.    ROS:   No chest pain, shortness of breath, or cough.        Medication Review:     Current Facility-Administered Medications:     acetaminophen (TYLENOL) tablet 650 mg, 650 mg, Oral, Q4H PRN **OR** acetaminophen (TYLENOL) 160 MG/5ML oral solution 650 mg, 650 mg, Oral, Q4H PRN, 650 mg at 10/19/24 2011 **OR** acetaminophen (TYLENOL) suppository 650 mg, 650 mg, Rectal, Q4H PRN, Isra Mcghee MD    aluminum-magnesium hydroxide-simethicone (MAALOX MAX) 400-400-40 MG/5ML suspension 15 mL, 15 mL, Oral, Q6H PRN, Isra Mcghee MD    amantadine (SYMMETREL) capsule 100 mg, 100 mg, Nasogastric, Q12H, Jose Roberto Mahmood MD, 100 mg at 10/20/24 1015    Barium Sulfate 60 % cream 1 teaspoon(s), 1 teaspoon(s), Oral, Once in imaging, Jose Roberto Mahmood MD    barium sulfate oral suspension 50 mL, 50 mL, Oral, Once in imaging, Jose Roberto Mahmood MD    barium sulfate oral suspension 50 mL, 50 mL, Oral, Once in imaging, Jose Roberto Mahmood MD    sennosides-docusate (PERICOLACE) 8.6-50 MG per tablet 2 tablet, 2 tablet, Oral, BID PRN **AND** polyethylene glycol (MIRALAX) packet 17 g, 17 g, Oral, Daily PRN **AND** bisacodyl (DULCOLAX) EC tablet 5 mg, 5 mg, Oral, Daily PRN **AND** bisacodyl (DULCOLAX) suppository 10 mg, 10 mg, Rectal, Daily PRN, Isra Mcghee MD    Calcium Replacement - Follow Nurse / BPA Driven Protocol, , Does not apply, Taz HENRIQUEZ Yadana, MD    cefTRIAXone (ROCEPHIN) 1,000 mg in sodium chloride 0.9 % 100 mL MBP, 1,000 mg, Intravenous, Q24H, Jose Roberto Mahmood MD, Last Rate: 200 mL/hr at 10/22/24 0954, 1,000 mg at 10/22/24 0954    dextrose (D50W) (25 g/50 mL) IV injection 25 g, 25 g, Intravenous, Q15 Min PRN, Isra Mcghee MD, 25 g at 10/20/24  1914    dextrose (D5W) 5 % infusion, 75 mL/hr, Intravenous, Continuous, Daniel Hodge MD, Last Rate: 75 mL/hr at 10/22/24 0253, 75 mL/hr at 10/22/24 0253    dextrose (GLUTOSE) oral gel 15 g, 15 g, Oral, Q15 Min PRN, Isra Mcghee MD    glucagon (GLUCAGEN) injection 1 mg, 1 mg, Subcutaneous, Q15 Min PRNTaz Yadana, MD    hydrALAZINE (APRESOLINE) injection 10 mg, 10 mg, Intravenous, Q6H PRNTaz Yadana, MD, 10 mg at 10/22/24 0042    labetalol (NORMODYNE,TRANDATE) injection 10 mg, 10 mg, Intravenous, Q6H PRN, Daniel Hodge MD    Magnesium Standard Dose Replacement - Follow Nurse / BPA Driven Protocol, , Does not apply, Taz HENRIQUEZ Yadana, MD    melatonin tablet 5 mg, 5 mg, Oral, Nightly PRNTaz Yadana, MD, 5 mg at 10/17/24 2002    mirtazapine (REMERON) tablet 7.5 mg, 7.5 mg, Nasogastric, Nightly, EifaviokJose Roberto MD, 7.5 mg at 10/19/24 2011    nitroglycerin (NITROSTAT) SL tablet 0.4 mg, 0.4 mg, Sublingual, Q5 Min PRTaz GONZALEZ Yadana, MD    ondansetron ODT (ZOFRAN-ODT) disintegrating tablet 4 mg, 4 mg, Oral, Q6H PRN **OR** [DISCONTINUED] ondansetron (ZOFRAN) injection 4 mg, 4 mg, Intravenous, Q6H PRTaz GONZALEZ Yadana, MD    Phosphorus Replacement - Follow Nurse / BPA Driven Protocol, , Does not apply, Taz HENRIQUEZ Yadana, MD    Potassium Replacement - Follow Nurse / BPA Driven Protocol, , Does not apply, Taz HENRIQUEZ Yadana, MD    sodium chloride 0.9 % flush 10 mL, 10 mL, Intravenous, PRTaz GONZALEZ Yadana, MD    ziprasidone (GEODON) 20 mg in sterile water (preservative free) 1 mL injection, 20 mg, Intramuscular, Q4H PRN, Candice Rucker APRN, 20 mg at 10/21/24 0415      Objective     Vital Signs  Vitals:    10/22/24 0831 10/22/24 0900 10/22/24 0931 10/22/24 1119   BP: 135/88 (!) 141/121 149/85 145/100   BP Location:       Patient Position:       Pulse: 102 105 111 96   Resp:   17 18   Temp:    97.5 °F (36.4 °C)   TempSrc:    Temporal   SpO2: 98% 100% 100% 100%   Weight:       Height:           Physical Exam: Patient is awake  in bed with sitter at bedside    General Appearance:    Awake and alert, in no acute distress, confused   Head:    Normocephalic, without obvious abnormality   Eyes:          Conjunctivae normal, anicteric sclera   Throat:   No oral lesions, no thrush, oral mucosa moist   Neck:   supple, no JVD   Lungs:     respirations regular, even and unlabored, 4 L nasal cannula   Abdomen:     Soft, non-tender, no rebound or guarding, non-distended   Rectal:     Deferred   Extremities:   No edema, no cyanosis   Skin:   No bruising or rash, no jaundice        Results Review:    CBC    Results from last 7 days   Lab Units 10/22/24  0057 10/20/24  2257 10/20/24  0827 10/19/24  0217 10/18/24  0235 10/17/24  0416 10/16/24  1103   WBC 10*3/mm3 7.35 6.25 6.74 9.29 12.09* 10.96* 8.39   HEMOGLOBIN g/dL 9.8* 9.6* 10.4* 10.6* 9.4* 9.0* 9.2*   PLATELETS 10*3/mm3 251 228 259 248 224 236 226     CMP   Results from last 7 days   Lab Units 10/22/24  0057 10/21/24  0934 10/20/24  2257 10/20/24  0827 10/19/24  1451 10/19/24  0217 10/18/24  0235 10/17/24  0416 10/16/24  1103   SODIUM mmol/L 136  --  142 141  --  145 142 146* 147*   POTASSIUM mmol/L 3.9 3.8 3.5 4.2 3.5 3.0* 3.8 3.9 4.1   CHLORIDE mmol/L 107  --  112* 111*  --  111* 108* 110* 109*   CO2 mmol/L 18.0*  --  19.6* 18.7*  --  24.1 24.2 22.6 25.8   BUN mg/dL 5*  --  6* 9  --  10 10 11 10   CREATININE mg/dL 0.66*  --  0.64* 0.75*  --  0.76 0.86 1.00 1.14   GLUCOSE mg/dL 100*  --  97 115*  --  164* 98 94 68   ALBUMIN g/dL 2.7*  --   --   --   --   --   --   --  3.2*   BILIRUBIN mg/dL 0.6  --   --   --   --   --   --   --  1.1   ALK PHOS U/L 207*  --   --   --   --   --   --   --  289*   AST (SGOT) U/L 18  --   --   --   --   --   --   --  24   ALT (SGPT) U/L 8  --   --   --   --   --   --   --  18   MAGNESIUM mg/dL 1.6  --   --  1.7  --  2.0 1.7 1.8 1.8   PHOSPHORUS mg/dL 2.8  --  3.0 2.2* 2.2* 2.2* 2.3* 2.4*  --    AMMONIA umol/L  --   --   --   --   --   --   --   --  34     Cr Clearance  "Estimated Creatinine Clearance: 105.8 mL/min (A) (by C-G formula based on SCr of 0.66 mg/dL (L)).  Coag     HbA1C No results found for: \"HGBA1C\"  Results from last 7 days   Lab Units 10/16/24  1103   CK TOTAL U/L 156     Infection   Results from last 7 days   Lab Units 10/19/24  0716 10/16/24  1103   BLOODCX   --  No growth at 5 days  No growth at 5 days   URINECX  >100,000 CFU/mL Escherichia coli*  --      UA    Results from last 7 days   Lab Units 10/19/24  0716   NITRITE UA  Positive*   WBC UA /HPF 21-50*   BACTERIA UA /HPF 4+*   SQUAM EPITHEL UA /HPF 0-2   URINECX  >100,000 CFU/mL Escherichia coli*     Microbiology Results (last 10 days)       Procedure Component Value - Date/Time    Urine Culture - Urine, Urine, Clean Catch [[id number]]  (Abnormal)  (Susceptibility) Collected: 10/19/24 0716    Lab Status: Final result Specimen: Urine, Clean Catch Updated: 10/21/24 1203     Urine Culture >100,000 CFU/mL Escherichia coli    Narrative:      Colonization of the urinary tract without infection is common. Treatment is discouraged unless the patient is symptomatic, pregnant, or undergoing an invasive urologic procedure.    Susceptibility        Escherichia coli      ESSIE      Amoxicillin + Clavulanate Susceptible      Ampicillin Susceptible      Ampicillin + Sulbactam Susceptible      Cefazolin Susceptible      Cefepime Susceptible      Ceftazidime Susceptible      Ceftriaxone Susceptible      Gentamicin Susceptible      Levofloxacin Susceptible      Nitrofurantoin Susceptible      Piperacillin + Tazobactam Susceptible      Trimethoprim + Sulfamethoxazole Susceptible                           CANDIDA AURIS PCR - Swab, Axilla Right, Axilla Left and Groin [[id number]]  (Normal) Collected: 10/16/24 2040    Lab Status: Final result Specimen: Swab from Axilla Right, Axilla Left and Groin Updated: 10/17/24 0957     JOAO AURIS PCR Not Detected    Respiratory Panel PCR w/COVID-19(SARS-CoV-2) ZELDA/ARA/RONALDO/PAD/COR/SHONDA In-House, " NP Swab in UTM/VTM, 2 HR TAT - Swab, Nasopharynx [[id number]]  (Normal) Collected: 10/16/24 2018    Lab Status: Final result Specimen: Swab from Nasopharynx Updated: 10/16/24 2146     ADENOVIRUS, PCR Not Detected     Coronavirus 229E Not Detected     Coronavirus HKU1 Not Detected     Coronavirus NL63 Not Detected     Coronavirus OC43 Not Detected     COVID19 Not Detected     Human Metapneumovirus Not Detected     Human Rhinovirus/Enterovirus Not Detected     Influenza A PCR Not Detected     Influenza B PCR Not Detected     Parainfluenza Virus 1 Not Detected     Parainfluenza Virus 2 Not Detected     Parainfluenza Virus 3 Not Detected     Parainfluenza Virus 4 Not Detected     RSV, PCR Not Detected     Bordetella pertussis pcr Not Detected     Bordetella parapertussis PCR Not Detected     Chlamydophila pneumoniae PCR Not Detected     Mycoplasma pneumo by PCR Not Detected    Narrative:      In the setting of a positive respiratory panel with a viral infection PLUS a negative procalcitonin without other underlying concern for bacterial infection, consider observing off antibiotics or discontinuation of antibiotics and continue supportive care. If the respiratory panel is positive for atypical bacterial infection (Bordetella pertussis, Chlamydophila pneumoniae, or Mycoplasma pneumoniae), consider antibiotic de-escalation to target atypical bacterial infection.    Blood Culture - Blood, Arm, Right [[id number]]  (Normal) Collected: 10/16/24 1103    Lab Status: Final result Specimen: Blood from Arm, Right Updated: 10/21/24 1115     Blood Culture No growth at 5 days    Blood Culture - Blood, Arm, Right [[id number]]  (Normal) Collected: 10/16/24 1103    Lab Status: Final result Specimen: Blood from Arm, Right Updated: 10/21/24 1115     Blood Culture No growth at 5 days          Imaging Results (Last 72 Hours)       Procedure Component Value Units Date/Time    XR Chest 1 View [[id number]] Collected: 10/22/24 0950     Updated:  10/22/24 0954    Narrative:      XR CHEST 1 VW    Date of Exam: 10/22/2024 9:37 AM EDT    Indication: PTX chest tube    Comparison: Chest radiograph 10/21/2024    Findings:  Tiny residual right apical pneumothorax stable from prior comparison status post chest tube placement. No new consolidation, edema, large effusion or left pneumothorax. Heart size stable from prior. Degenerative related osseous changes. Carotid   atherosclerotic plaque. Cervical fusion hardware. Positive contrast noted in the colon from recent swallow study.      Impression:      Impression:  Stable tiny residual right apical pneumothorax status post chest tube placement.      Electronically Signed: Joey Tompkins MD    10/22/2024 9:52 AM EDT    Workstation ID: [id number]    XR Chest 1 View [[id number]] Collected: 10/21/24 1337     Updated: 10/21/24 1342    Narrative:      XR CHEST 1 VW    Date of Exam: 10/21/2024 1:08 PM EDT    Indication: Thoracentesis    Comparison: AP chest radiograph 10/21/2024 at 1056.    Findings:  Small bore pigtail catheter has been placed at the right apical pleural space. The large right pneumothorax has been reduced, with no residual pneumothorax visible. The lungs appear clear. Heart size is normal. No mediastinal shift. No significant   pleural fluid collection. Surgical changes of cervical spine. No acute osseous abnormality.      Impression:      Impression:  Right pneumothorax has been reduced status post pleural catheter placement.      Electronically Signed: Berenice Corbin MD    10/21/2024 1:39 PM EDT    Workstation ID: [id number]    CT Angiogram Head [[id number]] Collected: 10/21/24 1228     Updated: 10/21/24 1245    Narrative:      CT ANGIOGRAM HEAD    Date of Exam: 10/18/2024 6:15 AM EDT    Indication: Questionably altered, dysarthric, stroke evaluation, particular focus on vertebrobasilar system.    Comparison: None available.    Technique: CTA of the head was performed after the uneventful intravenous  administration of iodinated contrast. Reconstructed coronal and sagittal images were also obtained. In addition, a 3-D volume rendered image was created for interpretation.   Automated exposure control and iterative reconstruction methods were used.      Findings:  The carotid siphons are patent. The anterior cerebral arteries are normal in course and caliber bilaterally. The right and left middle cerebral arteries demonstrate no evidence of flow-limiting stenosis, large vessel occlusion or aneurysm. The vertebral   basilar system is patent. The posterior cerebral arteries are normal in course and caliber bilaterally.      Impression:      Impression:  Normal CT angiogram of the head.        Electronically Signed: Ahsan Romero MD    10/21/2024 12:43 PM EDT    Workstation ID: [id number]    XR Chest 1 View [[id number]] Collected: 10/21/24 1123     Updated: 10/21/24 1129    Narrative:      XR CHEST 1 VW    Date of Exam: 10/21/2024 11:13 AM EDT    Indication: pneumothorax on prior xray    Comparison: Chest radiograph 10/20/2024    Findings:  Right-sided pneumothorax similarly measuring up to 4.5 cm at the apex however there is increasing pleural gas more laterally measuring up to 2.8 cm along the lateral aspect of right upper lobe previously only 1.4 cm. Slight increasing perihilar and right   basilar atelectasis. Left lung relatively clear. No evidence of left effusion or pneumothorax. Cardiomediastinal silhouette similar to previous comparison. Aortic atherosclerotic disease. Degenerative related osseous changes.      Impression:      Impression:  Increasing now moderate to large right pneumothorax.    Findings communicated with ordering provider Dr. Hodge via epic secure chat at 11:25 a.m. 10/21/2024.    Electronically Signed: Joey Tompkins MD    10/21/2024 11:27 AM EDT    Workstation ID: [id number]    XR Abdomen KUB [[id number]] Collected: 10/21/24 0653     Updated: 10/21/24 0656    Narrative:      XR ABDOMEN  KUB    Date of Exam: 10/21/2024 6:51 AM EDT    Indication: Severe abdominal pain    Comparison: 10/20/2024.    Findings:  Feeding tube is removed. There is mild gaseous distention of the stomach. There are gas-filled loops of nondistended small bowel. Barium contrast is seen throughout the colon. No pneumatosis intestinalis. No evidence of pneumoperitoneum. No definite   pathologic abdominal calcification. Mild thoracolumbar spondylosis.      Impression:      Impression:  No acute abdominal abnormality.        Electronically Signed: Aron Santos MD    10/21/2024 6:54 AM EDT    Workstation ID: [id number]    XR Chest 1 View [[id number]] Collected: 10/20/24 2232     Updated: 10/20/24 2235    Narrative:      XR CHEST 1 VW    Date of Exam: 10/20/2024 9:18 PM EDT    Indication: Pneumothroax    Comparison: 10/20/2024.    Findings:  There is a stable moderate right-sided pneumothorax with pleural separation measuring up to 44 mm. There is minor atelectasis in the right perihilar lung, which appears increased from the prior study. There is linear scarring in the left mid and lower   lung. Heart size and pulmonary vasculature appear within normal limits. No pleural effusion or focal pneumonia.      Impression:      Impression:  1.Stable moderate right-sided pneumothorax.  2.Mild right perihilar atelectasis.  3.Left lung scarring.        Electronically Signed: Aron Santos MD    10/20/2024 10:32 PM EDT    Workstation ID: [id number]    XR Chest 1 View [[id number]] Collected: 10/20/24 1603     Updated: 10/20/24 1614    Narrative:      XR CHEST 1 VW    Date of Exam: 10/20/2024 3:27 PM EDT    Indication: Aspiration    Comparison: Chest radiograph dated 10/16/2024    Findings:  The cardiac silhouette is within normal limits. Pulmonary vascularity appears normal. There is a new 4 cm right-sided pneumothorax. There is no mediastinal shift. There is no pleural effusion. No acute osseous findings.      Impression:      Impression:  1.  New 4 cm right-sided pneumothorax.    Findings were called to patient's nurse Scarlet at 4:12 p.m. on 10/20/2024. She will relay the results to Dr. Mahmood.      Electronically Signed: Monico Nixor    10/20/2024 4:12 PM EDT    Workstation ID: [id number]    XR Abdomen KUB [[id number]] Collected: 10/20/24 1453     Updated: 10/20/24 1500    Narrative:      XR ABDOMEN KUB    Date of Exam: 10/20/2024 2:33 PM EDT    Indication: ng placement    Comparison: None available.    Findings:  An enteric tube is present, which appears to follow the course of the right mainstem bronchus, projecting into the right medial lower thorax.     There is radiopaque contrast within the colon.         Impression:      Impression:  Enteric tube within the right mainstem bronchus.    These findings were reported to the patient's nurse on 10/20/2024 at approximately 3:00 p.m. eastern standard time. She received the findings and reported that the enteric tube has already been removed.      Electronically Signed: Jennifer Simpson MD    10/20/2024 2:58 PM EDT    Workstation ID: [id number]    XR Abdomen KUB [[id number]] Collected: 10/20/24 0252     Updated: 10/20/24 0307    Narrative:      XR ABDOMEN KUB    Date of Exam: 10/20/2024 2:36 AM EDT    Indication: NG tube, possible aspiration    Comparison: 10/19/2024.    Findings:  Feeding tube tip is in the stomach. No distended bowel. Lung bases are clear.      Impression:      Impression:  Feeding tube tip is in the stomach.        Electronically Signed: Aron Santos MD    10/20/2024 3:05 AM EDT    Workstation ID: [id number]    XR Abdomen KUB [[id number]] Collected: 10/19/24 1748     Updated: 10/19/24 1751    Narrative:      XR ABDOMEN KUB    Date of Exam: 10/19/2024 5:32 PM EDT    Indication: NG Tube    Comparison: None available.    Findings:  There is oral contrast noted within several small bowel loops related to prior swallow exam. Esophagogastric tube tip is below the diaphragm overlying the proximal  stomach likely in the mid gastric body. Nonspecific bowel gas pattern.      Impression:      Impression:  Esophagogastric tube tip overlies the proximal stomach.        Electronically Signed: Lico Mills MD    10/19/2024 5:49 PM EDT    Workstation ID: [id number]    FL Video Swallow With Speech Single Contrast [[id number]] Resulted: 10/19/24 1334     Updated: 10/19/24 1334    Narrative:      This procedure was auto-finalized with no dictation required.            Assessment & Plan   -Dysphagia - tight cricopharyngeal muscles versus confusion versus stricture  -Altered mental status-baseline dementia plus UTI  -UTI  -Normocytic anemia  -Elevated alkaline phosphatase     PLAN:  Patient is a 69-year-old male with a history of dementia and alcohol abuse, who presented to [facility] ED from [facility] unit for multiple unwitnessed falls.  GI was consulted for a failed swallowing eval with concern for tight cricopharyngeal muscle.  Patient has had pulled out 3 different NG tubes during this admission.  Patient was reportedly eating well prior to falls.  Confusion is thought to be due to UTI.     Patient is more alert and following commands today.  Await speech therapy evaluation to reassess dysphagia.  May consider EGD with dilation if swallowing if not improved to baseline.  Treatment of UTI per primary care team.  Hemoglobin stable at 9.8 compared to 9.6 on 10/20/21.  Continue to monitor H&H and transfuse as needed.  Alkaline phosphatase slightly improving to 207 compared to 289 on 10/16/2020.  GGT elevated to 118. Will check liver serologies and US.  Supportive care.      Electronically signed by NANCY Yu, 10/22/24, 11:44 AM EDT.

## 2024-10-22 NOTE — PROGRESS NOTES
Daily Progress Note          Assessment    Spontaneous moderate right pneumothorax    AMS (altered mental status)/delirium: Due to dementia with behavioral disturbances  Aspiration pneumonia/pneumonitis    Hypoxia: ABGs on 10/16/2024 7.47/30.9/90.9  UTI: Cultures positive for gram-negative bacilli  Hypertension  Anemia  History of dysphagia status post G-tube placement.     Evaluated by SLP and diagnosed with moderate-severe dysphagia and recommended aspiration precautions        Recommendations:  Right chest tube placed 10/21/2024 with near complete resolution of pneumothorax, place tube to waterseal and repeat chest x-ray in a.m.        Oxygen supplement and titration to maintain saturation 90 to 95%: Currently on 4 L per nasal cannula     Aspiration precautions  Antibiotics: Rocephin to complete 10/23/2024        I personally reviewed the radiological studies               LOS: 5 days     Subjective     No apparent respiratory distress    Objective     Vital signs for last 24 hours:  Vitals:    10/22/24 0801 10/22/24 0831 10/22/24 0900 10/22/24 0931   BP: 152/96 135/88 (!) 141/121 149/85   BP Location:       Patient Position:       Pulse: 105 102 105 111   Resp: 22   17   Temp:       TempSrc:       SpO2: 100% 98% 100% 100%   Weight:       Height:           Intake/Output last 3 shifts:  I/O last 3 completed shifts:  In: 0   Out: 1705 [Urine:1705]  Intake/Output this shift:  I/O this shift:  In: -   Out: 125 [Urine:125]      Radiology  Imaging Results (Last 24 Hours)       Procedure Component Value Units Date/Time    XR Chest 1 View [[id number]] Resulted: 10/22/24 0938     Updated: 10/22/24 0938    XR Chest 1 View [[id number]] Collected: 10/21/24 1337     Updated: 10/21/24 1342    Narrative:      XR CHEST 1 VW    Date of Exam: 10/21/2024 1:08 PM EDT    Indication: Thoracentesis    Comparison: AP chest radiograph 10/21/2024 at 1056.    Findings:  Small bore pigtail catheter has been placed at the right apical pleural  space. The large right pneumothorax has been reduced, with no residual pneumothorax visible. The lungs appear clear. Heart size is normal. No mediastinal shift. No significant   pleural fluid collection. Surgical changes of cervical spine. No acute osseous abnormality.      Impression:      Impression:  Right pneumothorax has been reduced status post pleural catheter placement.      Electronically Signed: Berenice Corbin MD    10/21/2024 1:39 PM EDT    Workstation ID: [id number]    CT Angiogram Head [[id number]] Collected: 10/21/24 1228     Updated: 10/21/24 1245    Narrative:      CT ANGIOGRAM HEAD    Date of Exam: 10/18/2024 6:15 AM EDT    Indication: Questionably altered, dysarthric, stroke evaluation, particular focus on vertebrobasilar system.    Comparison: None available.    Technique: CTA of the head was performed after the uneventful intravenous administration of iodinated contrast. Reconstructed coronal and sagittal images were also obtained. In addition, a 3-D volume rendered image was created for interpretation.   Automated exposure control and iterative reconstruction methods were used.      Findings:  The carotid siphons are patent. The anterior cerebral arteries are normal in course and caliber bilaterally. The right and left middle cerebral arteries demonstrate no evidence of flow-limiting stenosis, large vessel occlusion or aneurysm. The vertebral   basilar system is patent. The posterior cerebral arteries are normal in course and caliber bilaterally.      Impression:      Impression:  Normal CT angiogram of the head.        Electronically Signed: Ahsan Romero MD    10/21/2024 12:43 PM EDT    Workstation ID: [id number]    XR Chest 1 View [[id number]] Collected: 10/21/24 1123     Updated: 10/21/24 1129    Narrative:      XR CHEST 1 VW    Date of Exam: 10/21/2024 11:13 AM EDT    Indication: pneumothorax on prior xray    Comparison: Chest radiograph 10/20/2024    Findings:  Right-sided pneumothorax  similarly measuring up to 4.5 cm at the apex however there is increasing pleural gas more laterally measuring up to 2.8 cm along the lateral aspect of right upper lobe previously only 1.4 cm. Slight increasing perihilar and right   basilar atelectasis. Left lung relatively clear. No evidence of left effusion or pneumothorax. Cardiomediastinal silhouette similar to previous comparison. Aortic atherosclerotic disease. Degenerative related osseous changes.      Impression:      Impression:  Increasing now moderate to large right pneumothorax.    Findings communicated with ordering provider Dr. Hodge via epic secure chat at 11:25 a.m. 10/21/2024.    Electronically Signed: Joey Tompkins MD    10/21/2024 11:27 AM EDT    Workstation ID: [id number]            Labs:  Results from last 7 days   Lab Units 10/22/24  0057   WBC 10*3/mm3 7.35   HEMOGLOBIN g/dL 9.8*   HEMATOCRIT % 30.0*   PLATELETS 10*3/mm3 251     Results from last 7 days   Lab Units 10/22/24  0057   SODIUM mmol/L 136   POTASSIUM mmol/L 3.9   CHLORIDE mmol/L 107   CO2 mmol/L 18.0*   BUN mg/dL 5*   CREATININE mg/dL 0.66*   CALCIUM mg/dL 8.8   BILIRUBIN mg/dL 0.6   ALK PHOS U/L 207*   ALT (SGPT) U/L 8   AST (SGOT) U/L 18   GLUCOSE mg/dL 100*     Results from last 7 days   Lab Units 10/16/24  1426   PH, ARTERIAL pH units 7.477*   PO2 ART mm Hg 90.9   PCO2, ARTERIAL mm Hg 30.5*   HCO3 ART mmol/L 22.6     Results from last 7 days   Lab Units 10/22/24  0057 10/16/24  1103   ALBUMIN g/dL 2.7* 3.2*     Results from last 7 days   Lab Units 10/16/24  1103   CK TOTAL U/L 156         Results from last 7 days   Lab Units 10/22/24  0057   MAGNESIUM mg/dL 1.6         Results from last 7 days   Lab Units 10/16/24  1103   TSH uIU/mL 2.200   FREE T4 ng/dL 1.58           Meds:   SCHEDULE  amantadine, 100 mg, Nasogastric, Q12H  Barium Sulfate, 1 teaspoon(s), Oral, Once in imaging  barium sulfate, 50 mL, Oral, Once in imaging  barium sulfate, 50 mL, Oral, Once in  imaging  cefTRIAXone, 1,000 mg, Intravenous, Q24H  mirtazapine, 7.5 mg, Nasogastric, Nightly      Infusions  dextrose, 75 mL/hr, Last Rate: 75 mL/hr (10/22/24 0253)      PRNs    acetaminophen **OR** acetaminophen **OR** acetaminophen    aluminum-magnesium hydroxide-simethicone    senna-docusate sodium **AND** polyethylene glycol **AND** bisacodyl **AND** bisacodyl    Calcium Replacement - Follow Nurse / BPA Driven Protocol    dextrose    dextrose    glucagon (human recombinant)    hydrALAZINE    labetalol    Magnesium Standard Dose Replacement - Follow Nurse / BPA Driven Protocol    melatonin    nitroglycerin    ondansetron ODT **OR** [DISCONTINUED] ondansetron    Phosphorus Replacement - Follow Nurse / BPA Driven Protocol    Potassium Replacement - Follow Nurse / BPA Driven Protocol    sodium chloride    ziprasidone (GEODON) 20 mg in sterile water (preservative free) 1 mL injection    Physical Exam:  General Appearance: Awake but confused  HEENT:  Normocephalic, without obvious abnormality, Conjunctiva/corneas clear,.   Nares normal, no drainage     Neck:  Supple, symmetrical, trachea midline.   Lungs /Chest wall: Equal breath sounds bilaterally respirations unlabored, symmetrical wall movement.     Heart:  Regular rate and rhythm, S1 S2 normal  Abdomen: Soft, non-tender, no masses, no organomegaly.    Extremities: No edema, no clubbing or cyanosis     ROS  Unobtainable due to mental status      I reviewed the recent clinical results  I personally reviewed the latest radiological studies    Part of this note may be an electronic transcription/translation of spoken language to printed text using the Dragon Dictation System.

## 2024-10-22 NOTE — PROGRESS NOTES
Nutrition Services    Patient Name: Shahram Chaney  YOB: 1955  MRN: [id number]  Admission date: 10/16/2024    PROGRESS NOTE      Encounter Information: Checking in on the tube feeding plan. Pt continues with no tube feeding access. Discussed with RN who reports that speech therapy is going to try puree foods with pt again. No plans for an NGT at this time due to previous tube causing a pneumothorax. Speech therapy continues to recommend NPO from evaluation yesterday. D5 is currently providing 306 kcal/day.        PO Diet: NPO Diet NPO Type: Strict NPO   PO Supplements: None, pt is NPO    PO Intake:  N/A       Current nutrition support: Isosource 1.5 at 30 ml/hr ordered, 30 ml/hr water flush    Nutrition support review: No feeding access.        Labs (reviewed below): Reviewed. MD to manage while pt is not getting alternative nutrition        GI Function:  Last BM documented 10/18        Nutrition Intervention Updates: Monitor decisions regarding nutrition. Tube feeding order is in for if pt obtains feeding access. Would recommend a PEG if speech therapy continues to recommend NPO.     Isosource 1.5 at 30 ml/hr, 30 ml/hr water flush        Results from last 7 days   Lab Units 10/22/24  0057 10/21/24  0934 10/20/24  2257 10/20/24  0827 10/17/24  0416 10/16/24  1103   SODIUM mmol/L 136  --  142 141   < > 147*   POTASSIUM mmol/L 3.9 3.8 3.5 4.2   < > 4.1   CHLORIDE mmol/L 107  --  112* 111*   < > 109*   CO2 mmol/L 18.0*  --  19.6* 18.7*   < > 25.8   BUN mg/dL 5*  --  6* 9   < > 10   CREATININE mg/dL 0.66*  --  0.64* 0.75*   < > 1.14   CALCIUM mg/dL 8.8  --  8.5* 8.9   < > 8.8   BILIRUBIN mg/dL 0.6  --   --   --   --  1.1   ALK PHOS U/L 207*  --   --   --   --  289*   ALT (SGPT) U/L 8  --   --   --   --  18   AST (SGOT) U/L 18  --   --   --   --  24   GLUCOSE mg/dL 100*  --  97 115*   < > 68    < > = values in this interval not displayed.     Results from last 7 days   Lab Units 10/22/24  0057 10/20/24  2450  "10/20/24  0827 10/19/24  1451 10/19/24  0217   MAGNESIUM mg/dL 1.6  --  1.7  --  2.0   PHOSPHORUS mg/dL 2.8   < > 2.2*   < > 2.2*   HEMOGLOBIN g/dL 9.8*   < > 10.4*  --  10.6*   HEMATOCRIT % 30.0*   < > 33.3*  --  34.0*    < > = values in this interval not displayed.     COVID19   Date Value Ref Range Status   10/16/2024 Not Detected Not Detected - Ref. Range Final     No results found for: \"HGBA1C\"    Malnutrition Severity Assessment      Patient meets criteria for : Moderate (non-severe) Malnutrition              RD to follow up per protocol.    Electronically signed by:  Mayda Dailey RD  10/22/24 10:58 EDT   "

## 2024-10-22 NOTE — PLAN OF CARE
"Goal Outcome Evaluation:           Assessment: Shahram Chaney presents with ADL impairments affecting function including balance, cognition, endurance / activity tolerance, postural / trunk control, range of motion (ROM), and strength. Pt only tolerated sitting for about 2 mins before requesting to return to bed. Demonstrated functioning below baseline abilities indicate the need for continued skilled intervention while inpatient. Tolerating session today without incident. Will continue to follow and progress as tolerated.     Plan/Recommendations:   Moderate Intensity Therapy recommended post-acute care. This is recommended as therapy feels the patient would require 3-4 days per week and wouldn't tolerate \"3 hour daily\" rehab intensity. SNF would be the preferred choice. If the patient does not agree to SNF, arrange HH or OP depending on home bound status. If patient is medically complex, consider LTACH.. Pt requires no DME at discharge.                         "

## 2024-10-22 NOTE — THERAPY TREATMENT NOTE
"Subjective: Pt agreeable to therapeutic plan of care.  Cognition: oriented to Person    Objective:     Bed Mobility: Max-A   Functional Transfers: Max-A and Assist x 2     Balance: sitting EOB Min-A  Functional Ambulation: N/A or Not attempted.    Therapeutic Exercise - Completed reaching while sitting EOB.  Promoting ROM due to UE restraints in use.      Vitals: WNL    Pain: 0 VAS  Location: NA  Interventions for pain: N/A  Education: Provided education on the importance of mobility in the acute care setting      Assessment: Shahram Chaney presents with ADL impairments affecting function including balance, cognition, endurance / activity tolerance, postural / trunk control, range of motion (ROM), and strength. Pt only tolerated sitting for about 2 mins before requesting to return to bed. Demonstrated functioning below baseline abilities indicate the need for continued skilled intervention while inpatient. Tolerating session today without incident. Will continue to follow and progress as tolerated.     Plan/Recommendations:   Moderate Intensity Therapy recommended post-acute care. This is recommended as therapy feels the patient would require 3-4 days per week and wouldn't tolerate \"3 hour daily\" rehab intensity. SNF would be the preferred choice. If the patient does not agree to SNF, arrange HH or OP depending on home bound status. If patient is medically complex, consider LTACH.. Pt requires no DME at discharge.     Pt desires Skilled Rehab placement at discharge. Pt cooperative; agreeable to therapeutic recommendations and plan of care.     Modified [place]: N/A = No pre-op stroke/TIA    Post-Tx Position: Up in Chair, Alarms activated, and Call light and personal items within reach  PPE: gloves    "

## 2024-10-22 NOTE — PLAN OF CARE
Goal Outcome Evaluation:           Progress: no change  Outcome Evaluation: Nurse came on shift at 2300. Pt in Non-Violent restraints. Pulmonology and Gi are following this patient. Strict NPO at this time

## 2024-10-22 NOTE — PROGRESS NOTES
[facility] MEDICINE SERVICE  DAILY PROGRESS NOTE    NAME: Shahram Chaney  : 1955  MRN: [id number]      LOS: 5 days     PROVIDER OF SERVICE: Daniel Hodge MD    Chief Complaint: AMS (altered mental status)    Subjective:     Interval History:  History taken from: patient    No acute overnight events. disoriented    Review of Systems:   Review of Systems    Objective:     Vital Signs  Temp:  [97.5 °F (36.4 °C)-98.3 °F (36.8 °C)] 97.5 °F (36.4 °C)  Heart Rate:  [] 96  Resp:  [15-26] 18  BP: (127-178)/() 157/105  Flow (L/min) (Oxygen Therapy):  [4] 4   Body mass index is 24.07 kg/m².    Physical Exam  General Appearance:  Awake, Disoriented   Head:  Atraumatic normocephalic   Eyes:        No sclera icterus   Neck: Normal R   Pulm: Decreased breath sounds   Cardio: Tachycardiac during my eval, rhythm regular   Extremities: No remarkable edema on ble   Abdomen: Soft non tender         Musculoskeletal: Moves all extremities   Neuro: disoriented                 Scheduled Meds   amantadine, 100 mg, Nasogastric, Q12H  Barium Sulfate, 1 teaspoon(s), Oral, Once in imaging  barium sulfate, 50 mL, Oral, Once in imaging  barium sulfate, 50 mL, Oral, Once in imaging  cefTRIAXone, 1,000 mg, Intravenous, Q24H  mirtazapine, 7.5 mg, Nasogastric, Nightly       PRN Meds     acetaminophen **OR** acetaminophen **OR** acetaminophen    aluminum-magnesium hydroxide-simethicone    senna-docusate sodium **AND** polyethylene glycol **AND** bisacodyl **AND** bisacodyl    Calcium Replacement - Follow Nurse / BPA Driven Protocol    dextrose    dextrose    glucagon (human recombinant)    hydrALAZINE    labetalol    Magnesium Standard Dose Replacement - Follow Nurse / BPA Driven Protocol    melatonin    nitroglycerin    ondansetron ODT **OR** [DISCONTINUED] ondansetron    Phosphorus Replacement - Follow Nurse / BPA Driven Protocol    Potassium Replacement - Follow Nurse / BPA Driven Protocol    sodium chloride     ziprasidone (GEODON) 20 mg in sterile water (preservative free) 1 mL injection   Infusions  dextrose, 75 mL/hr, Last Rate: 75 mL/hr (10/22/24 0253)          Diagnostic Data    Results from last 7 days   Lab Units 10/22/24  0057   WBC 10*3/mm3 7.35   HEMOGLOBIN g/dL 9.8*   HEMATOCRIT % 30.0*   PLATELETS 10*3/mm3 251   GLUCOSE mg/dL 100*   CREATININE mg/dL 0.66*   BUN mg/dL 5*   SODIUM mmol/L 136   POTASSIUM mmol/L 3.9   AST (SGOT) U/L 18   ALT (SGPT) U/L 8   ALK PHOS U/L 207*   BILIRUBIN mg/dL 0.6   ANION GAP mmol/L 11.0       US Abdomen Limited    Result Date: 10/22/2024  1. Mildly distended gallbladder with intraluminal sludge/debris. No other findings by ultrasound to suggest acute cholecystitis. Recommend clinical correlation. 2. No evidence of biliary cholestasis. Electronically Signed: Joey Tompkins MD  10/22/2024 12:40 PM EDT  Workstation ID: [id number]    XR Chest 1 View    Result Date: 10/22/2024  Impression: Stable tiny residual right apical pneumothorax status post chest tube placement. Electronically Signed: Joey Tompkins MD  10/22/2024 9:52 AM EDT  Workstation ID: [id number]    XR Chest 1 View    Result Date: 10/21/2024  Impression: Right pneumothorax has been reduced status post pleural catheter placement. Electronically Signed: Berenice Corbin MD  10/21/2024 1:39 PM EDT  Workstation ID: [id number]    XR Chest 1 View    Result Date: 10/21/2024  Impression: Increasing now moderate to large right pneumothorax. Findings communicated with ordering provider Dr. Hodge via epic secure chat at 11:25 a.m. 10/21/2024. Electronically Signed: Joey Tompkins MD  10/21/2024 11:27 AM EDT  Workstation ID: [id number]    XR Abdomen KUB    Result Date: 10/21/2024  Impression: No acute abdominal abnormality. Electronically Signed: Aron Santos MD  10/21/2024 6:54 AM EDT  Workstation ID: [id number]    XR Chest 1 View    Result Date: 10/20/2024  Impression: 1.Stable moderate right-sided pneumothorax. 2.Mild right perihilar  atelectasis. 3.Left lung scarring. Electronically Signed: Aron Santos MD  10/20/2024 10:32 PM EDT  Workstation ID: [id number]       I reviewed the patient's new clinical results.    Assessment/Plan:   A 69 y.o. old male patient with PMH of abdominal aortic aneurysm and alcohol dependence anxiety hypertension monoclonal gammopathy psychosis anemia GERD presents to the hospital with complaints of altered mental status physical weakness lethargy for past few days.      Active and Resolved Problems        Active Hospital Problems     Diagnosis   POA    **AMS (altered mental status) [R41.82]   Yes    Moderate malnutrition [E44.0]   Yes    Altered mental status [R41.82]   Yes       Resolved Hospital Problems   No resolved problems to display.         Plan:   -MRI negative for stroke, CTA head/neck: neg for acute pathologies  -On ceftriaxone for UTI, on day 3, Urine Cx: pan sensitive , blood cx neg, abx also cover possible mastoiditis   -GI following for dysphagia, currently awaiting further eval given his mental status, had PTX while placing NGT a day prior, continue D5 for now, monitor blood glucose   -Discussed with POA (brother) , he stated patient did not want to have Aggressive means of treatment, open to discussing his care with palliative for possible making him hospice  -Pulm following for PTX, s/p chest tube placement this am on 10/20/24, daily CXR  -Give diuldad for pain .25, PRN Labetalol 10 PRN, however HTN could be due to pain given recent chest tube placement   -Neuro reccs noted, Pt is agitated and pulling on lines, on restraints to avoid harm  -AM Labs    VTE Prophylaxis:  Mechanical VTE prophylaxis orders are present.         Code status is   There are no questions and answers to display.       Plan for disposition:Palliative consult, AMS, chest tube, pTX    Time: 30 minutes    Part of this note may be an electronic transcription/translation of spoken language to printed text using the Dragon Dictation  System.    Signature: Electronically signed by Daniel Hodge MD, 10/22/24, 15:56 EDT.  [facility]

## 2024-10-22 NOTE — PLAN OF CARE
Goal Outcome Evaluation:     Shahram Chaney is a 70 y/o M who was dx with acute encephalopathy with underlying cognitive decline. He presents with functional mobility impairments which indicate the need for skilled intervention. He is alert and oriented to self, pleasantly confused. BUE restraints due to tendency to pull at chest tube. Supine-to-sit to L side of bed mod-A x2, assisted trunk and LE, tactile/verbal cues. Min-A while sitting at EOB due to posterior leaning. Performed some seated ther ex with tactile cues, pt gets distracted ~5 rep. Tolerating session today without incident. Will continue to follow and progress as tolerated.

## 2024-10-22 NOTE — THERAPY TREATMENT NOTE
"Subjective: Pt agreeable to therapeutic plan of care. Eager to sit on EOB.    Objective:     Precautions - BUE restraints, chest tube    Bed mobility - Mod-A and Assist x 2, assisted trunk and LE  Transfers - N/A or Not attempted.  Ambulation - 0 feet N/A or Not attempted.    Therapeutic Exercise - 5 Reps Bilaterally AROM unsupported sitting / EOB, min-A  Supine: ankle pumps  Seated: ALEAH candelario    Vitals: WNL, 4L O2 via nc    Pain: 0 VAS   Location: n/a  Intervention for pain: N/A    Education: Provided education on the importance of mobility in the acute care setting    Assessment: Shahram Chaney is a 70 y/o M who was dx with acute encephalopathy with underlying cognitive decline. He presents with functional mobility impairments which indicate the need for skilled intervention. He is alert and oriented to self, pleasantly confused. BUE restraints due to tendency to pull at chest tube. Supine-to-sit to L side of bed mod-A x2, assisted trunk and LE, tactile/verbal cues. Min-A while sitting at EOB due to posterior leaning. Performed some seated ther ex with tactile cues, pt gets distracted ~5 rep. Tolerating session today without incident. Will continue to follow and progress as tolerated.     Plan/Recommendations:   If medically appropriate, Moderate Intensity Therapy recommended post-acute care. This is recommended as therapy feels the patient would require 3-4 days per week and wouldn't tolerate \"3 hour daily\" rehab intensity. SNF would be the preferred choice. If the patient does not agree to SNF, arrange HH or OP depending on home bound status. If patient is medically complex, consider LTACH. Pt requires no DME at discharge.     Pt desires Skilled Rehab placement at discharge. Pt cooperative; agreeable to therapeutic recommendations and plan of care.       Post-Tx Position: Supine with HOB Elevated, Alarms activated, and Call light and personal items within reach, BUE restraints  PPE: gloves    "

## 2024-10-22 NOTE — THERAPY RE-EVALUATION
Acute Care - Speech Language Pathology   Swallow Re-Evaluation  Sivakumar     Patient Name: Shahram Chaney  : 1955  MRN: [id number]  Today's Date: 10/22/2024               Admit Date: 10/16/2024    Visit Dx:     ICD-10-CM ICD-9-CM   1. Altered mental status, unspecified altered mental status type  R41.82 780.97     Patient Active Problem List   Diagnosis    AMS (altered mental status)    Altered mental status    Moderate malnutrition       SLP Recommendation and Plan     EDUCATION  The patient has been educated in the following areas:   Dysphagia (Swallowing Impairment) Oral Care/Hydration NPO rationale.      SLP GOALS       Row Name 10/22/24 1045       (LTG) Swallow    (LTG) Swallow Patient will participate in ongoing assessment of swallow, including reevaluation of swallow clinically and/or including instrumental assessment of swallow if indicated, to further assess swallow function in anticipation of initiation of PO diet.  -PF    [place] (Swallow Long Term Goal) with minimal cues (75-90% accuracy)  -PF    Time Frame (Swallow Long Term Goal) 1 week  -PF    Progress/Outcomes (Swallow Long Term Goal) goal ongoing  -PF    Comment (Swallow Long Term Goal) See above  -PF       (STG) Patient will tolerate trials of    Consistencies Trialed (Tolerate trials) thin liquids;pureed textures  -PF    Desired Outcome (Tolerate trials) with adequate oral prep/transit/clearance;without signs of distress;without signs/symptoms of aspiration  -PF    [place] (Tolerate trials) with minimal cues (75-90% accuracy)  -PF    Time Frame (Tolerate trials) by discharge  -PF    Progress/Outcomes (Tolerate trials) progress slower than expected  -PF       (STG) Swallow 1    (STG) Swallow 1 The patient will participate in ongoing assessment of swallow, including re-evaluation clinically and/or including instrumental assessment of swallow if indicated, to further assess swallow function in anticipation to initiate a PO diet  -PF     [place] (Swallow Short Term Goal 1) with minimal cues (75-90% accuracy)  -PF    Time Frame (Swallow Short Term Goal 1) by discharge  -PF    Progress/Outcomes (Swallow Short Term Goal 1) goal ongoing  -PF    Comment (Swallow Short Term Goal 1) Pt was seen for swallow re-evaluation this date. Sitter at bedside. Pt was awake, alert, confused, and amenable to PO trials of ice chips x3. Pt is currently NPO w/ [place] Water Protocol w/ no source of nutrition. Natural dentition. Rotary munching on ice chips, but functional, w/ unremarkable oral transit. No difficulty initiating pharyngeal swallow, but multiple swallows noted w/ a cough reaction on 2/3 trials, throat clearing on 1/3 trials, and wet/gurgly vocal quality noted on all trials of ice chips. Per above persistent overt s/s of aspiration observed, pt is judged to be not safe for PO at this time. Pt is not an appropriate candidate for repeat VFSS as recent VFSS findings revealed pt's anatomy and cervical spine hardware C3-C5 preventing the epiglottis' deflection resulting in reduced airway protection during a swallow. D/t current clinical presentation, it is rec'd pt remain NPO w/ alternative means of nutrition and [place] Water Protocol (see guidelines below). Suspect pt's swallow function may worsen if pt continues with no source of nutrition. D/w nsg regarding current ST recs and rationale and they verbalized understanding of all discussed. ST will continue to follow for ongoing re-evaluation w/ further recs as indicated.     Recommendations:    - NPO w/ alternative means of nutrition and [place] Water Protocol (see guidelines below)  - Meds: via alternate route  - Pt may have single small ice chips when pt is awake/alert, sitting upright, and resp status is stable  - ST will continue to follow for ongoing re-evaluation w/ further recs as indicated.     Water Protocol:  The rationale to recommend water when a PO diet cannot appropriately/functionally sustain  nutrition is because water is low risk for aspiration pna when compared to aspiration of food or other liquids.    Benefits of a water protocol include but are not limited to:     Oral gratification  Engagement of oropharyngeal swallow musculature  Decrease likelihood of dehydration       Guidelines for proper implementation include:  Thorough oral care prior to consuming water  Upright at 90 degree hip flexion  Small sips at slow rate  Monitor for any changes in respiratory status and discontinue if distress noted     The [place] Water Protocol is a research-based protocol established in 1984. -PF              User Key  (r) = Recorded By, (t) = Taken By, (c) = Cosigned By      Initials Name Provider Type    PF Fakto, [place], SLP Speech and Language Pathologist               BARON Ring  10/22/2024

## 2024-10-23 ENCOUNTER — INPATIENT HOSPITAL (OUTPATIENT)
Dept: URBAN - METROPOLITAN AREA HOSPITAL 84 | Facility: HOSPITAL | Age: 69
End: 2024-10-23

## 2024-10-23 ENCOUNTER — APPOINTMENT (OUTPATIENT)
Dept: GENERAL RADIOLOGY | Facility: HOSPITAL | Age: 69
End: 2024-10-23
Payer: MEDICARE

## 2024-10-23 ENCOUNTER — INPATIENT HOSPITAL (OUTPATIENT)
Age: 69
End: 2024-10-23

## 2024-10-23 DIAGNOSIS — D64.9 ANEMIA, UNSPECIFIED: ICD-10-CM

## 2024-10-23 DIAGNOSIS — R13.10 DYSPHAGIA, UNSPECIFIED: ICD-10-CM

## 2024-10-23 DIAGNOSIS — R63.30 FEEDING DIFFICULTIES, UNSPECIFIED: ICD-10-CM

## 2024-10-23 DIAGNOSIS — R74.8 ABNORMAL LEVELS OF OTHER SERUM ENZYMES: ICD-10-CM

## 2024-10-23 LAB
ALBUMIN SERPL-MCNC: 2.9 G/DL (ref 3.5–5.2)
ALBUMIN/GLOB SERPL: 0.9 G/DL
ALP SERPL-CCNC: 202 U/L (ref 39–117)
ALT SERPL W P-5'-P-CCNC: 10 U/L (ref 1–41)
ANA SER QL: NEGATIVE
ANION GAP SERPL CALCULATED.3IONS-SCNC: 10.4 MMOL/L (ref 5–15)
AST SERPL-CCNC: 22 U/L (ref 1–40)
BASOPHILS # BLD AUTO: 0.05 10*3/MM3 (ref 0–0.2)
BASOPHILS NFR BLD AUTO: 0.7 % (ref 0–1.5)
BILIRUB SERPL-MCNC: 0.5 MG/DL (ref 0–1.2)
BUN SERPL-MCNC: 5 MG/DL (ref 8–23)
BUN/CREAT SERPL: 7.8 (ref 7–25)
CALCIUM SPEC-SCNC: 9.1 MG/DL (ref 8.6–10.5)
CHLORIDE SERPL-SCNC: 106 MMOL/L (ref 98–107)
CO2 SERPL-SCNC: 21.6 MMOL/L (ref 22–29)
CREAT SERPL-MCNC: 0.64 MG/DL (ref 0.76–1.27)
DEPRECATED RDW RBC AUTO: 50 FL (ref 37–54)
EGFRCR SERPLBLD CKD-EPI 2021: 101.8 ML/MIN/1.73
EOSINOPHIL # BLD AUTO: 0.11 10*3/MM3 (ref 0–0.4)
EOSINOPHIL NFR BLD AUTO: 1.5 % (ref 0.3–6.2)
ERYTHROCYTE [DISTWIDTH] IN BLOOD BY AUTOMATED COUNT: 14.6 % (ref 12.3–15.4)
GLOBULIN UR ELPH-MCNC: 3.2 GM/DL
GLUCOSE BLDC GLUCOMTR-MCNC: 102 MG/DL (ref 70–105)
GLUCOSE BLDC GLUCOMTR-MCNC: 108 MG/DL (ref 70–105)
GLUCOSE BLDC GLUCOMTR-MCNC: 95 MG/DL (ref 70–105)
GLUCOSE BLDC GLUCOMTR-MCNC: 97 MG/DL (ref 70–105)
GLUCOSE SERPL-MCNC: 110 MG/DL (ref 65–99)
HCT VFR BLD AUTO: 32.3 % (ref 37.5–51)
HGB BLD-MCNC: 10.6 G/DL (ref 13–17.7)
IMM GRANULOCYTES # BLD AUTO: 0.06 10*3/MM3 (ref 0–0.05)
IMM GRANULOCYTES NFR BLD AUTO: 0.8 % (ref 0–0.5)
LKM-1 AB SER-ACNC: 1.5 UNITS (ref 0–20)
LYMPHOCYTES # BLD AUTO: 1.3 10*3/MM3 (ref 0.7–3.1)
LYMPHOCYTES NFR BLD AUTO: 17.7 % (ref 19.6–45.3)
MAGNESIUM SERPL-MCNC: 1.8 MG/DL (ref 1.6–2.4)
MCH RBC QN AUTO: 29.8 PG (ref 26.6–33)
MCHC RBC AUTO-ENTMCNC: 32.8 G/DL (ref 31.5–35.7)
MCV RBC AUTO: 90.7 FL (ref 79–97)
MITOCHONDRIA M2 IGG SER-ACNC: <20 UNITS (ref 0–20)
MONOCYTES # BLD AUTO: 0.96 10*3/MM3 (ref 0.1–0.9)
MONOCYTES NFR BLD AUTO: 13.1 % (ref 5–12)
NEUTROPHILS NFR BLD AUTO: 4.86 10*3/MM3 (ref 1.7–7)
NEUTROPHILS NFR BLD AUTO: 66.2 % (ref 42.7–76)
NRBC BLD AUTO-RTO: 0 /100 WBC (ref 0–0.2)
PHOSPHATE SERPL-MCNC: 2.7 MG/DL (ref 2.5–4.5)
PLATELET # BLD AUTO: 314 10*3/MM3 (ref 140–450)
PMV BLD AUTO: 11.4 FL (ref 6–12)
POTASSIUM SERPL-SCNC: 3.7 MMOL/L (ref 3.5–5.2)
PROT SERPL-MCNC: 6.1 G/DL (ref 6–8.5)
RBC # BLD AUTO: 3.56 10*6/MM3 (ref 4.14–5.8)
SMA IGG SER-ACNC: 3 UNITS (ref 0–19)
SODIUM SERPL-SCNC: 138 MMOL/L (ref 136–145)
WBC NRBC COR # BLD AUTO: 7.34 10*3/MM3 (ref 3.4–10.8)

## 2024-10-23 PROCEDURE — 0 DEXTROSE 5 % SOLUTION: Performed by: STUDENT IN AN ORGANIZED HEALTH CARE EDUCATION/TRAINING PROGRAM

## 2024-10-23 PROCEDURE — 85025 COMPLETE CBC W/AUTO DIFF WBC: CPT | Performed by: NURSE PRACTITIONER

## 2024-10-23 PROCEDURE — 82948 REAGENT STRIP/BLOOD GLUCOSE: CPT

## 2024-10-23 PROCEDURE — 25010000002 CEFTRIAXONE PER 250 MG: Performed by: STUDENT IN AN ORGANIZED HEALTH CARE EDUCATION/TRAINING PROGRAM

## 2024-10-23 PROCEDURE — 71045 X-RAY EXAM CHEST 1 VIEW: CPT

## 2024-10-23 PROCEDURE — 83735 ASSAY OF MAGNESIUM: CPT | Performed by: STUDENT IN AN ORGANIZED HEALTH CARE EDUCATION/TRAINING PROGRAM

## 2024-10-23 PROCEDURE — 99231 SBSQ HOSP IP/OBS SF/LOW 25: CPT | Mod: FS | Performed by: NURSE PRACTITIONER

## 2024-10-23 PROCEDURE — 84100 ASSAY OF PHOSPHORUS: CPT | Performed by: STUDENT IN AN ORGANIZED HEALTH CARE EDUCATION/TRAINING PROGRAM

## 2024-10-23 PROCEDURE — 82948 REAGENT STRIP/BLOOD GLUCOSE: CPT | Performed by: STUDENT IN AN ORGANIZED HEALTH CARE EDUCATION/TRAINING PROGRAM

## 2024-10-23 PROCEDURE — 80053 COMPREHEN METABOLIC PANEL: CPT | Performed by: NURSE PRACTITIONER

## 2024-10-23 PROCEDURE — 97110 THERAPEUTIC EXERCISES: CPT

## 2024-10-23 RX ADMIN — DEXTROSE MONOHYDRATE 75 ML/HR: 50 INJECTION, SOLUTION INTRAVENOUS at 07:10

## 2024-10-23 RX ADMIN — CEFTRIAXONE 1000 MG: 1 INJECTION, POWDER, FOR SOLUTION INTRAMUSCULAR; INTRAVENOUS at 09:30

## 2024-10-23 RX ADMIN — DEXTROSE MONOHYDRATE 75 ML/HR: 50 INJECTION, SOLUTION INTRAVENOUS at 21:42

## 2024-10-23 NOTE — PROGRESS NOTES
Daily Progress Note          Assessment    Spontaneous moderate right pneumothorax    AMS (altered mental status)/delirium: Due to dementia with behavioral disturbances  Aspiration pneumonia/pneumonitis    Hypoxia: ABGs on 10/16/2024 7.47/30.9/90.9  UTI: Cultures positive for gram-negative bacilli  Hypertension  Anemia  History of dysphagia status post G-tube placement.     Evaluated by SLP and diagnosed with moderate-severe dysphagia and recommended aspiration precautions        Recommendations:  Right chest tube placed 10/21/2024 with resolution of pneumothorax, I will remove the chest tube today       Oxygen supplement and titration to maintain saturation 90 to 95%: Currently on 3 L per nasal cannula     Aspiration precautions  Antibiotics: Rocephin to complete 10/23/2024        I personally reviewed the radiological studies               LOS: 6 days     Subjective     No apparent respiratory distress    Objective     Vital signs for last 24 hours:  Vitals:    10/22/24 2311 10/23/24 0200 10/23/24 0411 10/23/24 0452   BP: 150/93  148/90    BP Location: Right arm  Right arm    Patient Position: Lying  Lying    Pulse: 81 101 91 89   Resp: 19  17    Temp: 97.7 °F (36.5 °C)      TempSrc: Axillary  Axillary    SpO2: 100% 100% 100% 100%   Weight:       Height:           Intake/Output last 3 shifts:  I/O last 3 completed shifts:  In: 0   Out: 725 [Urine:725]  Intake/Output this shift:  No intake/output data recorded.      Radiology  Imaging Results (Last 24 Hours)       Procedure Component Value Units Date/Time    XR Chest 1 View [[id number]] Collected: 10/23/24 0710     Updated: 10/23/24 0713    Narrative:      XR CHEST 1 VW    Date of Exam: 10/23/2024 6:15 AM EDT    Indication: Right pneumothorax    Comparison: 10/22/2024    Findings:  Stable chest tube overlying the right lung apex. No discrete residual pneumothorax. The heart size is normal. Lungs are without consolidation. No pleural effusion. No pneumothorax on the  left.      Impression:      Impression:  1. Stable chest tube overlying the right lung apex. No discrete residual pneumothorax.  2. Clear lungs.        Electronically Signed: Lico Mills MD    10/23/2024 7:11 AM EDT    Workstation ID: [id number]    US Abdomen Limited [[id number]] Collected: 10/22/24 1236     Updated: 10/22/24 1242    Narrative:      US ABDOMEN LIMITED    Date of Exam: 10/22/2024 12:01 PM EDT    Indication: elevated LFTs.    Comparison: No comparisons available.    Technique: Grayscale and color Doppler ultrasound evaluation of the right upper quadrant was performed.      Findings:  Partially obscured portions of the liver due to presence of known chest tube. Hepatic parenchyma appears fairly homogeneous. Questionable increased hepatic echogenicity suggestive but not diagnostic of hepatic steatosis.. No focal hepatic lesion.   Hepatopetal flow of the main portal vein. No visualized ascites.    Low-level echogenic material noted throughout the gallbladder with mild distention measuring up to 9 cm in length. No wall thickening or pericholecystic fluid.    No intrahepatic duct dilation. The common bile duct measures maximally 3 mm.    The visualized portions of the head and body of the pancreas are grossly unremarkable. The pancreatic tail is not seen due to bowel gas.    Homogeneous cortical echotexture of the right kidney. No hydronephrosis, shadowing echogenicities or solid masses.          Impression:      1. Mildly distended gallbladder with intraluminal sludge/debris. No other findings by ultrasound to suggest acute cholecystitis. Recommend clinical correlation.  2. No evidence of biliary cholestasis.        Electronically Signed: Joey Tompkins MD    10/22/2024 12:40 PM EDT    Workstation ID: [id number]            Labs:  Results from last 7 days   Lab Units 10/23/24  0441   WBC 10*3/mm3 7.34   HEMOGLOBIN g/dL 10.6*   HEMATOCRIT % 32.3*   PLATELETS 10*3/mm3 314     Results from last 7 days   Lab  Units 10/23/24  0441   SODIUM mmol/L 138   POTASSIUM mmol/L 3.7   CHLORIDE mmol/L 106   CO2 mmol/L 21.6*   BUN mg/dL 5*   CREATININE mg/dL 0.64*   CALCIUM mg/dL 9.1   BILIRUBIN mg/dL 0.5   ALK PHOS U/L 202*   ALT (SGPT) U/L 10   AST (SGOT) U/L 22   GLUCOSE mg/dL 110*     Results from last 7 days   Lab Units 10/16/24  1426   PH, ARTERIAL pH units 7.477*   PO2 ART mm Hg 90.9   PCO2, ARTERIAL mm Hg 30.5*   HCO3 ART mmol/L 22.6     Results from last 7 days   Lab Units 10/23/24  0441 10/22/24  0057 10/16/24  1103   ALBUMIN g/dL 2.9* 2.7* 3.2*     Results from last 7 days   Lab Units 10/16/24  1103   CK TOTAL U/L 156     Results from last 7 days   Lab Units 10/22/24  1437   MILY  Negative     Results from last 7 days   Lab Units 10/23/24  0441   MAGNESIUM mg/dL 1.8         Results from last 7 days   Lab Units 10/16/24  1103   TSH uIU/mL 2.200   FREE T4 ng/dL 1.58           Meds:   SCHEDULE  amantadine, 100 mg, Nasogastric, Q12H  Barium Sulfate, 1 teaspoon(s), Oral, Once in imaging  barium sulfate, 50 mL, Oral, Once in imaging  barium sulfate, 50 mL, Oral, Once in imaging  mirtazapine, 7.5 mg, Nasogastric, Nightly      Infusions  dextrose, 75 mL/hr, Last Rate: 75 mL/hr (10/23/24 0710)      PRNs    acetaminophen **OR** acetaminophen **OR** acetaminophen    aluminum-magnesium hydroxide-simethicone    senna-docusate sodium **AND** polyethylene glycol **AND** bisacodyl **AND** bisacodyl    Calcium Replacement - Follow Nurse / BPA Driven Protocol    dextrose    dextrose    glucagon (human recombinant)    hydrALAZINE    labetalol    Magnesium Standard Dose Replacement - Follow Nurse / BPA Driven Protocol    melatonin    nitroglycerin    ondansetron ODT **OR** [DISCONTINUED] ondansetron    Phosphorus Replacement - Follow Nurse / BPA Driven Protocol    Potassium Replacement - Follow Nurse / BPA Driven Protocol    sodium chloride    ziprasidone (GEODON) 20 mg in sterile water (preservative free) 1 mL injection    Physical  Exam:  General Appearance: Awake, answering few questions but confused  HEENT:  Normocephalic, without obvious abnormality, Conjunctiva/corneas clear,.   Nares normal, no drainage     Neck:  Supple, symmetrical, trachea midline.   Lungs /Chest wall: Equal breath sounds bilaterally respirations unlabored, symmetrical wall movement.     Heart:  Regular rate and rhythm, S1 S2 normal  Abdomen: Soft, non-tender, no masses, no organomegaly.    Extremities: No edema, no clubbing or cyanosis     ROS  Unobtainable due to mental status      I reviewed the recent clinical results  I personally reviewed the latest radiological studies    Part of this note may be an electronic transcription/translation of spoken language to printed text using the Dragon Dictation System.

## 2024-10-23 NOTE — PLAN OF CARE
Problem: Adult Inpatient Plan of Care  Goal: Plan of Care Review  Outcome: Progressing  Goal: Patient-Specific Goal (Individualized)  Outcome: Progressing  Goal: Absence of Hospital-Acquired Illness or Injury  Outcome: Progressing  Intervention: Identify and Manage Fall Risk  Recent Flowsheet Documentation  Taken 10/23/2024 1600 by Maren Goddard RN  Safety Promotion/Fall Prevention:   activity supervised   safety round/check completed   assistive device/personal items within reach  Taken 10/23/2024 1400 by Maren Goddard RN  Safety Promotion/Fall Prevention:   activity supervised   clutter free environment maintained   assistive device/personal items within reach   elopement precautions   fall prevention program maintained   safety round/check completed  Taken 10/23/2024 1200 by Maren Goddard RN  Safety Promotion/Fall Prevention:   activity supervised   clutter free environment maintained   elopement precautions   assistive device/personal items within reach   safety round/check completed   lighting adjusted  Taken 10/23/2024 1000 by Maren Goddard RN  Safety Promotion/Fall Prevention:   assistive device/personal items within reach   safety round/check completed   lighting adjusted  Taken 10/23/2024 0800 by Maren Goddard RN  Safety Promotion/Fall Prevention:   activity supervised   assistive device/personal items within reach   safety round/check completed   nonskid shoes/slippers when out of bed   fall prevention program maintained   clutter free environment maintained  Intervention: Prevent and Manage VTE (Venous Thromboembolism) Risk  Recent Flowsheet Documentation  Taken 10/23/2024 1600 by Maren Goddard RN  VTE Prevention/Management:   SCDs (sequential compression devices) off   patient refused intervention  Taken 10/23/2024 1200 by Maren Goddard RN  VTE Prevention/Management:   SCDs (sequential compression devices) off   patient refused intervention  Taken 10/23/2024 0800 by Nitza  ALBERT Engel  VTE Prevention/Management:   SCDs (sequential compression devices) off   patient refused intervention  Intervention: Prevent Infection  Recent Flowsheet Documentation  Taken 10/23/2024 1600 by Maren Goddard RN  Infection Prevention: environmental surveillance performed  Taken 10/23/2024 1400 by Maren Goddard RN  Infection Prevention:   environmental surveillance performed   rest/sleep promoted   personal protective equipment utilized   hand hygiene promoted   equipment surfaces disinfected   cohorting utilized   single patient room provided  Taken 10/23/2024 1200 by Maren Goddard RN  Infection Prevention: environmental surveillance performed  Taken 10/23/2024 1000 by Maren Goddard RN  Infection Prevention: environmental surveillance performed  Taken 10/23/2024 0800 by Maren Goddard RN  Infection Prevention:   single patient room provided   rest/sleep promoted   personal protective equipment utilized   hand hygiene promoted   equipment surfaces disinfected   environmental surveillance performed   cohorting utilized  Goal: Optimal Comfort and Wellbeing  Outcome: Progressing  Intervention: Provide Person-Centered Care  Recent Flowsheet Documentation  Taken 10/23/2024 0800 by Maren Goddard RN  Trust Relationship/Rapport: thoughts/feelings acknowledged  Goal: Readiness for Transition of Care  Outcome: Progressing     Problem: Fall Injury Risk  Goal: Absence of Fall and Fall-Related Injury  Outcome: Progressing  Intervention: Identify and Manage Contributors  Recent Flowsheet Documentation  Taken 10/23/2024 1600 by Maren Goddard RN  Medication Review/Management: medications reviewed  Taken 10/23/2024 1400 by Maren Goddard RN  Medication Review/Management: medications reviewed  Taken 10/23/2024 1200 by Maren Goddard RN  Medication Review/Management: medications reviewed  Taken 10/23/2024 1000 by Maren Goddard RN  Medication Review/Management: medications reviewed  Taken 10/23/2024  0800 by Maren Goddard RN  Medication Review/Management: medications reviewed  Intervention: Promote Injury-Free Environment  Recent Flowsheet Documentation  Taken 10/23/2024 1600 by Maren Goddard RN  Safety Promotion/Fall Prevention:   activity supervised   safety round/check completed   assistive device/personal items within reach  Taken 10/23/2024 1400 by Maren Goddard RN  Safety Promotion/Fall Prevention:   activity supervised   clutter free environment maintained   assistive device/personal items within reach   elopement precautions   fall prevention program maintained   safety round/check completed  Taken 10/23/2024 1200 by Maren Goddard RN  Safety Promotion/Fall Prevention:   activity supervised   clutter free environment maintained   elopement precautions   assistive device/personal items within reach   safety round/check completed   lighting adjusted  Taken 10/23/2024 1000 by Maren Goddard RN  Safety Promotion/Fall Prevention:   assistive device/personal items within reach   safety round/check completed   lighting adjusted  Taken 10/23/2024 0800 by Maren Goddard RN  Safety Promotion/Fall Prevention:   activity supervised   assistive device/personal items within reach   safety round/check completed   nonskid shoes/slippers when out of bed   fall prevention program maintained   clutter free environment maintained     Problem: Skin Injury Risk Increased  Goal: Skin Health and Integrity  Outcome: Progressing  Intervention: Optimize Skin Protection  Recent Flowsheet Documentation  Taken 10/23/2024 0800 by Maren Goddard RN  Activity Management: bedrest  Pressure Reduction Techniques: weight shift assistance provided  Pressure Reduction Devices: pressure-redistributing mattress utilized     Problem: Confusion Acute  Goal: Optimal Cognitive Function  Outcome: Progressing     Problem: Seizure, Active Management  Goal: Absence of Seizure/Seizure-Related Injury  Outcome: Progressing     Problem:  Malnutrition  Goal: Improved Nutritional Intake  Outcome: Progressing  Goal: Improved Nutritional Intake  Outcome: Progressing     Problem: Electrolyte Imbalance  Goal: Electrolyte Balance  Outcome: Progressing  Intervention: Monitor and Manage Electrolyte Imbalance  Recent Flowsheet Documentation  Taken 10/23/2024 0800 by Maren Goddard RN  Fluid/Electrolyte Management: fluids provided     Problem: Hospitalized Older Adult  Goal: Optimal Cognitive Function  Outcome: Progressing  Goal: Effective Bowel Elimination  Outcome: Progressing  Goal: Optimal Coping  Outcome: Progressing  Goal: Fluid and Electrolyte Balance  Outcome: Progressing  Intervention: Monitor and Manage Fluid and Electrolyte Balance  Recent Flowsheet Documentation  Taken 10/23/2024 0800 by Maren Goddard RN  Fluid/Electrolyte Management: fluids provided  Goal: Optimal Functional Ability  Outcome: Progressing  Intervention: Promote Functional Ability  Recent Flowsheet Documentation  Taken 10/23/2024 0800 by Maren Goddard RN  Activity Management: bedrest  Goal: Improved Oral Intake  Outcome: Progressing  Goal: Adequate Sleep/Rest  Outcome: Progressing  Goal: Effective Urinary Elimination  Outcome: Progressing     Problem: Wound  Goal: Optimal Coping  Outcome: Progressing  Goal: Optimal Functional Ability  Outcome: Progressing  Intervention: Optimize Functional Ability  Recent Flowsheet Documentation  Taken 10/23/2024 0800 by Maren Goddard RN  Activity Management: bedrest  Goal: Absence of Infection Signs and Symptoms  Outcome: Progressing  Intervention: Prevent or Manage Infection  Recent Flowsheet Documentation  Taken 10/23/2024 1400 by Maren Goddard RN  Isolation Precautions: precautions maintained  Taken 10/23/2024 1200 by Maren Goddard RN  Isolation Precautions: precautions maintained  Taken 10/23/2024 0800 by Maren Goddard RN  Isolation Precautions: precautions maintained  Goal: Improved Oral Intake  Outcome: Progressing  Goal:  Optimal Pain Control and Function  Outcome: Progressing  Goal: Skin Health and Integrity  Outcome: Progressing  Intervention: Optimize Skin Protection  Recent Flowsheet Documentation  Taken 10/23/2024 0800 by Maren Goddard RN  Activity Management: bedrest  Pressure Reduction Techniques: weight shift assistance provided  Pressure Reduction Devices: pressure-redistributing mattress utilized  Goal: Optimal Wound Healing  Outcome: Progressing     Problem: Glycemic Control Impaired  Goal: Blood Glucose Level Within Target Range  Outcome: Progressing  Goal: Minimize Hypoglycemia Risk  Outcome: Progressing     Problem: Restraint, Nonviolent  Goal: Absence of Harm or Injury  Outcome: Progressing  Intervention: Implement Least Restrictive Safety Strategies  Recent Flowsheet Documentation  Taken 10/23/2024 1600 by Maren Goddard RN  Medical Device Protection: IV pole/bag removed from visual field  Diversional Activities:   sensory items   television  Taken 10/23/2024 1400 by Maren Goddard RN  Medical Device Protection: IV pole/bag removed from visual field  Diversional Activities:   sensory items   television  Taken 10/23/2024 1200 by Maren Goddard RN  Medical Device Protection: IV pole/bag removed from visual field  Diversional Activities:   television   sensory items  Taken 10/23/2024 1138 by Maren Goddard RN  Medical Device Protection: IV pole/bag removed from visual field  Diversional Activities:   sensory items   television  Taken 10/23/2024 1000 by Maren Goddard RN  Medical Device Protection: IV pole/bag removed from visual field  Diversional Activities:   sensory items   television  Taken 10/23/2024 0800 by Maren Goddard RN  Medical Device Protection: IV pole/bag removed from visual field  Diversional Activities: television  Intervention: Protect Dignity, Rights and Personal Wellbeing  Recent Flowsheet Documentation  Taken 10/23/2024 0800 by Maren Goddard RN  Trust Relationship/Rapport:  thoughts/feelings acknowledged  Intervention: Protect Skin and Joint Integrity  Recent Flowsheet Documentation  Taken 10/23/2024 0800 by Maren Goddard RN  Range of Motion: active ROM (range of motion) encouraged   Goal Outcome Evaluation:

## 2024-10-23 NOTE — PROGRESS NOTES
LOS: 6 days   Patient Care Team:  Zaki Fontanez MD as PCP - General (Family Medicine)      Subjective     Interval History:     Subjective: Continues to remain confused.  History is supplemented via chart review and bedside RN.      ROS:   Unable to obtain due to altered mental status.       Medication Review:     Current Facility-Administered Medications:     acetaminophen (TYLENOL) tablet 650 mg, 650 mg, Oral, Q4H PRN **OR** acetaminophen (TYLENOL) 160 MG/5ML oral solution 650 mg, 650 mg, Oral, Q4H PRN, 650 mg at 10/19/24 2011 **OR** acetaminophen (TYLENOL) suppository 650 mg, 650 mg, Rectal, Q4H PRN, Isra Mcghee MD    aluminum-magnesium hydroxide-simethicone (MAALOX MAX) 400-400-40 MG/5ML suspension 15 mL, 15 mL, Oral, Q6H PRN, Isra Mcghee MD    amantadine (SYMMETREL) capsule 100 mg, 100 mg, Nasogastric, Q12H, Jose Roberto Mahmood MD, 100 mg at 10/20/24 1015    Barium Sulfate 60 % cream 1 teaspoon(s), 1 teaspoon(s), Oral, Once in imaging, Jose Roberto Mahmood MD    barium sulfate oral suspension 50 mL, 50 mL, Oral, Once in imaging, Jose Roberto Mahmood MD    barium sulfate oral suspension 50 mL, 50 mL, Oral, Once in imaging, Jose Roberto Mahmood MD    sennosides-docusate (PERICOLACE) 8.6-50 MG per tablet 2 tablet, 2 tablet, Oral, BID PRN **AND** polyethylene glycol (MIRALAX) packet 17 g, 17 g, Oral, Daily PRN **AND** bisacodyl (DULCOLAX) EC tablet 5 mg, 5 mg, Oral, Daily PRN **AND** bisacodyl (DULCOLAX) suppository 10 mg, 10 mg, Rectal, Daily PRN, Isra Mcghee MD    Calcium Replacement - Follow Nurse / BPA Driven Protocol, , Does not apply, PRN, Isra Mcghee MD    dextrose (D50W) (25 g/50 mL) IV injection 25 g, 25 g, Intravenous, Q15 Min PRN, Oo, Yadana, MD, 25 g at 10/20/24 1914    dextrose (D5W) 5 % infusion, 75 mL/hr, Intravenous, Continuous, Daniel Hodge MD, Last Rate: 75 mL/hr at 10/23/24 0710, 75 mL/hr at 10/23/24 0710    dextrose (GLUTOSE) oral gel 15 g, 15 g, Oral, Q15 Min PRCARLOS, Isra Mcghee MD    glucagon  (GLUCAGEN) injection 1 mg, 1 mg, Subcutaneous, Q15 Min PRN, Isra Mcghee MD    hydrALAZINE (APRESOLINE) injection 10 mg, 10 mg, Intravenous, Q6H PRN, Isra Mcghee MD, 10 mg at 10/22/24 1737    labetalol (NORMODYNE,TRANDATE) injection 10 mg, 10 mg, Intravenous, Q6H PRN, Daniel Hodge MD, 10 mg at 10/22/24 2106    Magnesium Standard Dose Replacement - Follow Nurse / BPA Driven Protocol, , Does not apply, Taz HENRIQUEZ Yadana, MD    melatonin tablet 5 mg, 5 mg, Oral, Nightly PRNTaz Yadana, MD, 5 mg at 10/17/24 2002    mirtazapine (REMERON) tablet 7.5 mg, 7.5 mg, Nasogastric, Nightly, Jose Roberto Mahmood MD, 7.5 mg at 10/19/24 2011    nitroglycerin (NITROSTAT) SL tablet 0.4 mg, 0.4 mg, Sublingual, Q5 Min PRN, Isra Mcghee MD    ondansetron ODT (ZOFRAN-ODT) disintegrating tablet 4 mg, 4 mg, Oral, Q6H PRN, 4 mg at 10/22/24 1249 **OR** [DISCONTINUED] ondansetron (ZOFRAN) injection 4 mg, 4 mg, Intravenous, Q6H PRTaz GONZALEZ Yadana, MD    Phosphorus Replacement - Follow Nurse / BPA Driven Protocol, , Does not apply, Taz HENRIQUEZ Yadana, MD    Potassium Replacement - Follow Nurse / BPA Driven Protocol, , Does not apply, Taz HENRIQUEZ Yadana, MD    sodium chloride 0.9 % flush 10 mL, 10 mL, Intravenous, PRTaz GONZALEZ Yadana, MD    ziprasidone (GEODON) 20 mg in sterile water (preservative free) 1 mL injection, 20 mg, Intramuscular, Q4H PRN, Candice Rucker, APRN, 20 mg at 10/21/24 0415      Objective     Vital Signs  Vitals:    10/23/24 0200 10/23/24 0411 10/23/24 0452 10/23/24 1127   BP:  148/90  137/96   BP Location:  Right arm  Right arm   Patient Position:  Lying  Lying   Pulse: 101 91 89 86   Resp:  17  12   Temp:    98 °F (36.7 °C)   TempSrc:  Axillary  Oral   SpO2: 100% 100% 100% 100%   Weight:       Height:           Physical Exam: Patient remains confused laying in bed with restraints and sitter at bedside.    General Appearance:    Awake and alert, in no acute distress, confused   Head:    Normocephalic, without obvious abnormality  "  Eyes:          Conjunctivae normal, anicteric sclera   Throat:   No oral lesions, no thrush, oral mucosa moist   Neck:   supple, no JVD   Lungs:     respirations regular, even and unlabored   Abdomen:     Soft, non-tender, no rebound or guarding, non-distended   Rectal:     Deferred   Extremities:   no cyanosis, bilateral wrist restraints   Skin:   No bruising or rash, no jaundice        Results Review:    CBC    Results from last 7 days   Lab Units 10/23/24  0441 10/22/24  0057 10/20/24  2257 10/20/24  0827 10/19/24  0217 10/18/24  0235 10/17/24  0416   WBC 10*3/mm3 7.34 7.35 6.25 6.74 9.29 12.09* 10.96*   HEMOGLOBIN g/dL 10.6* 9.8* 9.6* 10.4* 10.6* 9.4* 9.0*   PLATELETS 10*3/mm3 314 251 228 259 248 224 236     CMP   Results from last 7 days   Lab Units 10/23/24  0441 10/22/24  0057 10/21/24  0934 10/20/24  2257 10/20/24  0827 10/19/24  1451 10/19/24  0217 10/18/24  0235 10/17/24  0416   SODIUM mmol/L 138 136  --  142 141  --  145 142 146*   POTASSIUM mmol/L 3.7 3.9 3.8 3.5 4.2 3.5 3.0* 3.8 3.9   CHLORIDE mmol/L 106 107  --  112* 111*  --  111* 108* 110*   CO2 mmol/L 21.6* 18.0*  --  19.6* 18.7*  --  24.1 24.2 22.6   BUN mg/dL 5* 5*  --  6* 9  --  10 10 11   CREATININE mg/dL 0.64* 0.66*  --  0.64* 0.75*  --  0.76 0.86 1.00   GLUCOSE mg/dL 110* 100*  --  97 115*  --  164* 98 94   ALBUMIN g/dL 2.9* 2.7*  --   --   --   --   --   --   --    BILIRUBIN mg/dL 0.5 0.6  --   --   --   --   --   --   --    ALK PHOS U/L 202* 207*  --   --   --   --   --   --   --    AST (SGOT) U/L 22 18  --   --   --   --   --   --   --    ALT (SGPT) U/L 10 8  --   --   --   --   --   --   --    MAGNESIUM mg/dL 1.8 1.6  --   --  1.7  --  2.0 1.7 1.8   PHOSPHORUS mg/dL 2.7 2.8  --  3.0 2.2* 2.2* 2.2* 2.3* 2.4*     Cr Clearance Estimated Creatinine Clearance: 109.1 mL/min (A) (by C-G formula based on SCr of 0.64 mg/dL (L)).  Coag     HbA1C No results found for: \"HGBA1C\"      Infection   Results from last 7 days   Lab Units 10/19/24  0716 "   URINECX  >100,000 CFU/mL Escherichia coli*     UA    Results from last 7 days   Lab Units 10/19/24  0716   NITRITE UA  Positive*   WBC UA /HPF 21-50*   BACTERIA UA /HPF 4+*   SQUAM EPITHEL UA /HPF 0-2   URINECX  >100,000 CFU/mL Escherichia coli*     Microbiology Results (last 10 days)       Procedure Component Value - Date/Time    Urine Culture - Urine, Urine, Clean Catch [[id number]]  (Abnormal)  (Susceptibility) Collected: 10/19/24 0716    Lab Status: Final result Specimen: Urine, Clean Catch Updated: 10/21/24 1203     Urine Culture >100,000 CFU/mL Escherichia coli    Narrative:      Colonization of the urinary tract without infection is common. Treatment is discouraged unless the patient is symptomatic, pregnant, or undergoing an invasive urologic procedure.    Susceptibility        Escherichia coli      ESSIE      Amoxicillin + Clavulanate Susceptible      Ampicillin Susceptible      Ampicillin + Sulbactam Susceptible      Cefazolin Susceptible      Cefepime Susceptible      Ceftazidime Susceptible      Ceftriaxone Susceptible      Gentamicin Susceptible      Levofloxacin Susceptible      Nitrofurantoin Susceptible      Piperacillin + Tazobactam Susceptible      Trimethoprim + Sulfamethoxazole Susceptible                           CANDIDA AURIS PCR - Swab, Axilla Right, Axilla Left and Groin [[id number]]  (Normal) Collected: 10/16/24 2040    Lab Status: Final result Specimen: Swab from Axilla Right, Axilla Left and Groin Updated: 10/17/24 0957     JOAO AURIS PCR Not Detected    Respiratory Panel PCR w/COVID-19(SARS-CoV-2) ZELDA/ARA/RONALDO/PAD/COR/SHONDA In-House, NP Swab in UTM/VTM, 2 HR TAT - Swab, Nasopharynx [[id number]]  (Normal) Collected: 10/16/24 2018    Lab Status: Final result Specimen: Swab from Nasopharynx Updated: 10/16/24 2146     ADENOVIRUS, PCR Not Detected     Coronavirus 229E Not Detected     Coronavirus HKU1 Not Detected     Coronavirus NL63 Not Detected     Coronavirus OC43 Not Detected     COVID19  Not Detected     Human Metapneumovirus Not Detected     Human Rhinovirus/Enterovirus Not Detected     Influenza A PCR Not Detected     Influenza B PCR Not Detected     Parainfluenza Virus 1 Not Detected     Parainfluenza Virus 2 Not Detected     Parainfluenza Virus 3 Not Detected     Parainfluenza Virus 4 Not Detected     RSV, PCR Not Detected     Bordetella pertussis pcr Not Detected     Bordetella parapertussis PCR Not Detected     Chlamydophila pneumoniae PCR Not Detected     Mycoplasma pneumo by PCR Not Detected    Narrative:      In the setting of a positive respiratory panel with a viral infection PLUS a negative procalcitonin without other underlying concern for bacterial infection, consider observing off antibiotics or discontinuation of antibiotics and continue supportive care. If the respiratory panel is positive for atypical bacterial infection (Bordetella pertussis, Chlamydophila pneumoniae, or Mycoplasma pneumoniae), consider antibiotic de-escalation to target atypical bacterial infection.    Blood Culture - Blood, Arm, Right [[id number]]  (Normal) Collected: 10/16/24 1103    Lab Status: Final result Specimen: Blood from Arm, Right Updated: 10/21/24 1115     Blood Culture No growth at 5 days    Blood Culture - Blood, Arm, Right [[id number]]  (Normal) Collected: 10/16/24 1103    Lab Status: Final result Specimen: Blood from Arm, Right Updated: 10/21/24 1115     Blood Culture No growth at 5 days          Imaging Results (Last 72 Hours)       Procedure Component Value Units Date/Time    XR Chest 1 View [[id number]] Collected: 10/23/24 0710     Updated: 10/23/24 0713    Narrative:      XR CHEST 1 VW    Date of Exam: 10/23/2024 6:15 AM EDT    Indication: Right pneumothorax    Comparison: 10/22/2024    Findings:  Stable chest tube overlying the right lung apex. No discrete residual pneumothorax. The heart size is normal. Lungs are without consolidation. No pleural effusion. No pneumothorax on the left.       Impression:      Impression:  1. Stable chest tube overlying the right lung apex. No discrete residual pneumothorax.  2. Clear lungs.        Electronically Signed: Lico Mills MD    10/23/2024 7:11 AM EDT    Workstation ID: [id number]    US Abdomen Limited [[id number]] Collected: 10/22/24 1236     Updated: 10/22/24 1242    Narrative:      US ABDOMEN LIMITED    Date of Exam: 10/22/2024 12:01 PM EDT    Indication: elevated LFTs.    Comparison: No comparisons available.    Technique: Grayscale and color Doppler ultrasound evaluation of the right upper quadrant was performed.      Findings:  Partially obscured portions of the liver due to presence of known chest tube. Hepatic parenchyma appears fairly homogeneous. Questionable increased hepatic echogenicity suggestive but not diagnostic of hepatic steatosis.. No focal hepatic lesion.   Hepatopetal flow of the main portal vein. No visualized ascites.    Low-level echogenic material noted throughout the gallbladder with mild distention measuring up to 9 cm in length. No wall thickening or pericholecystic fluid.    No intrahepatic duct dilation. The common bile duct measures maximally 3 mm.    The visualized portions of the head and body of the pancreas are grossly unremarkable. The pancreatic tail is not seen due to bowel gas.    Homogeneous cortical echotexture of the right kidney. No hydronephrosis, shadowing echogenicities or solid masses.          Impression:      1. Mildly distended gallbladder with intraluminal sludge/debris. No other findings by ultrasound to suggest acute cholecystitis. Recommend clinical correlation.  2. No evidence of biliary cholestasis.        Electronically Signed: Joey Tompkins MD    10/22/2024 12:40 PM EDT    Workstation ID: [id number]    XR Chest 1 View [[id number]] Collected: 10/22/24 0950     Updated: 10/22/24 0954    Narrative:      XR CHEST 1 VW    Date of Exam: 10/22/2024 9:37 AM EDT    Indication: PTX chest tube    Comparison: Chest  radiograph 10/21/2024    Findings:  Tiny residual right apical pneumothorax stable from prior comparison status post chest tube placement. No new consolidation, edema, large effusion or left pneumothorax. Heart size stable from prior. Degenerative related osseous changes. Carotid   atherosclerotic plaque. Cervical fusion hardware. Positive contrast noted in the colon from recent swallow study.      Impression:      Impression:  Stable tiny residual right apical pneumothorax status post chest tube placement.      Electronically Signed: Joey Tompkins MD    10/22/2024 9:52 AM EDT    Workstation ID: [id number]    XR Chest 1 View [[id number]] Collected: 10/21/24 1337     Updated: 10/21/24 1342    Narrative:      XR CHEST 1 VW    Date of Exam: 10/21/2024 1:08 PM EDT    Indication: Thoracentesis    Comparison: AP chest radiograph 10/21/2024 at 1056.    Findings:  Small bore pigtail catheter has been placed at the right apical pleural space. The large right pneumothorax has been reduced, with no residual pneumothorax visible. The lungs appear clear. Heart size is normal. No mediastinal shift. No significant   pleural fluid collection. Surgical changes of cervical spine. No acute osseous abnormality.      Impression:      Impression:  Right pneumothorax has been reduced status post pleural catheter placement.      Electronically Signed: Berenice Corbin MD    10/21/2024 1:39 PM EDT    Workstation ID: [id number]    CT Angiogram Head [[id number]] Collected: 10/21/24 1228     Updated: 10/21/24 1245    Narrative:      CT ANGIOGRAM HEAD    Date of Exam: 10/18/2024 6:15 AM EDT    Indication: Questionably altered, dysarthric, stroke evaluation, particular focus on vertebrobasilar system.    Comparison: None available.    Technique: CTA of the head was performed after the uneventful intravenous administration of iodinated contrast. Reconstructed coronal and sagittal images were also obtained. In addition, a 3-D volume rendered image was  created for interpretation.   Automated exposure control and iterative reconstruction methods were used.      Findings:  The carotid siphons are patent. The anterior cerebral arteries are normal in course and caliber bilaterally. The right and left middle cerebral arteries demonstrate no evidence of flow-limiting stenosis, large vessel occlusion or aneurysm. The vertebral   basilar system is patent. The posterior cerebral arteries are normal in course and caliber bilaterally.      Impression:      Impression:  Normal CT angiogram of the head.        Electronically Signed: Ahsan Romero MD    10/21/2024 12:43 PM EDT    Workstation ID: [id number]    XR Chest 1 View [[id number]] Collected: 10/21/24 1123     Updated: 10/21/24 1129    Narrative:      XR CHEST 1 VW    Date of Exam: 10/21/2024 11:13 AM EDT    Indication: pneumothorax on prior xray    Comparison: Chest radiograph 10/20/2024    Findings:  Right-sided pneumothorax similarly measuring up to 4.5 cm at the apex however there is increasing pleural gas more laterally measuring up to 2.8 cm along the lateral aspect of right upper lobe previously only 1.4 cm. Slight increasing perihilar and right   basilar atelectasis. Left lung relatively clear. No evidence of left effusion or pneumothorax. Cardiomediastinal silhouette similar to previous comparison. Aortic atherosclerotic disease. Degenerative related osseous changes.      Impression:      Impression:  Increasing now moderate to large right pneumothorax.    Findings communicated with ordering provider Dr. Hodge via epic secure chat at 11:25 a.m. 10/21/2024.    Electronically Signed: Joey Tompkins MD    10/21/2024 11:27 AM EDT    Workstation ID: [id number]    XR Abdomen KUB [[id number]] Collected: 10/21/24 0653     Updated: 10/21/24 0656    Narrative:      XR ABDOMEN KUB    Date of Exam: 10/21/2024 6:51 AM EDT    Indication: Severe abdominal pain    Comparison: 10/20/2024.    Findings:  Feeding tube is removed.  There is mild gaseous distention of the stomach. There are gas-filled loops of nondistended small bowel. Barium contrast is seen throughout the colon. No pneumatosis intestinalis. No evidence of pneumoperitoneum. No definite   pathologic abdominal calcification. Mild thoracolumbar spondylosis.      Impression:      Impression:  No acute abdominal abnormality.        Electronically Signed: Aron Santos MD    10/21/2024 6:54 AM EDT    Workstation ID: [id number]    XR Chest 1 View [[id number]] Collected: 10/20/24 2232     Updated: 10/20/24 2235    Narrative:      XR CHEST 1 VW    Date of Exam: 10/20/2024 9:18 PM EDT    Indication: Pneumothroax    Comparison: 10/20/2024.    Findings:  There is a stable moderate right-sided pneumothorax with pleural separation measuring up to 44 mm. There is minor atelectasis in the right perihilar lung, which appears increased from the prior study. There is linear scarring in the left mid and lower   lung. Heart size and pulmonary vasculature appear within normal limits. No pleural effusion or focal pneumonia.      Impression:      Impression:  1.Stable moderate right-sided pneumothorax.  2.Mild right perihilar atelectasis.  3.Left lung scarring.        Electronically Signed: Aron Santos MD    10/20/2024 10:32 PM EDT    Workstation ID: [id number]    XR Chest 1 View [[id number]] Collected: 10/20/24 1603     Updated: 10/20/24 1614    Narrative:      XR CHEST 1 VW    Date of Exam: 10/20/2024 3:27 PM EDT    Indication: Aspiration    Comparison: Chest radiograph dated 10/16/2024    Findings:  The cardiac silhouette is within normal limits. Pulmonary vascularity appears normal. There is a new 4 cm right-sided pneumothorax. There is no mediastinal shift. There is no pleural effusion. No acute osseous findings.      Impression:      Impression:  1. New 4 cm right-sided pneumothorax.    Findings were called to patient's nurse Scarlet at 4:12 p.m. on 10/20/2024. She will relay the results to   Eirk.      Electronically Signed: Monico Cosby    10/20/2024 4:12 PM EDT    Workstation ID: [id number]    XR Abdomen KUB [[id number]] Collected: 10/20/24 1453     Updated: 10/20/24 1500    Narrative:      XR ABDOMEN KUB    Date of Exam: 10/20/2024 2:33 PM EDT    Indication: ng placement    Comparison: None available.    Findings:  An enteric tube is present, which appears to follow the course of the right mainstem bronchus, projecting into the right medial lower thorax.     There is radiopaque contrast within the colon.         Impression:      Impression:  Enteric tube within the right mainstem bronchus.    These findings were reported to the patient's nurse on 10/20/2024 at approximately 3:00 p.m. eastern standard time. She received the findings and reported that the enteric tube has already been removed.      Electronically Signed: Jennifer Simpson MD    10/20/2024 2:58 PM EDT    Workstation ID: [id number]            Assessment & Plan   -Dysphagia/failed swallow evaluation/feeding difficulties  -Altered mental status-baseline dementia plus UTI  -UTI  -Normocytic anemia  -Elevated alkaline phosphatase     PLAN:  Patient is a 69-year-old male with a history of dementia and alcohol abuse, who presented to [facility] ED from [facility] for multiple unwitnessed falls.  GI was consulted for a failed swallowing eval with concern for tight cricopharyngeal muscle.  Patient has had pulled out 3 different NG tubes during this admission.  Patient was reportedly eating well prior to falls.  Confusion is thought to be due to UTI.    Per speech therapy: Patient is still not safe for oral intake at this time and is not an appropriate candidate for repeat VFSS, as recent VFSS revealed the patient's anatomy and cervical spine hardware resulted in reduced airway protection during a swallow.  Per bedside RN, family would not like to pursue invasive measures for possible G-tube placement and doubt EGD with dilation would  be helpful.  Palliative on board to discuss goals of care.  Treatment of UTI per primary care team.  Hemoglobin stable at 10.6    Alkaline phosphatase 202 improving from 289 on 10/16/2020.  GGT elevated to 118.  Alpha 1 antitrypsin elevated to 277.  Ceruloplasmin and vitamin B12 normal.  Iron 13.  Ferritin 651.  Hepatitis panel negative.  MILY negative.  Antimicrosomal antibody, mitochondrial antibodies, and anti-smooth muscle antibody titer pending.  RUQ US shows mildly distended gallbladder with intraluminal sludge/debris's.  No suggestions of acute cholecystitis.  No biliary cholestasis.  Supportive care.    Electronically signed by Estefani Eastman, NANCY, 10/23/24, 11:54 AM EDT.

## 2024-10-23 NOTE — PROGRESS NOTES
Nutrition Services    Patient Name: Shahram Chaney  YOB: 1955  MRN: [id number]  Admission date: 10/16/2024    PROGRESS NOTE      Encounter Information: Checking in on the tube feeding plan.Discussed with RN. She reports plans to likely transition to hospice today. Family reported that pt did not want a PEG. D5 is providing 306 kcal/day.       PO Diet: NPO Diet NPO Type: Strict NPO   PO Supplements: None, pt is NPO    PO Intake:  N/A       Current nutrition support: Isosource 1.5 at 30 ml/hr ordered, 30 ml/hr water flush    Nutrition support review: No feeding access.        Labs (reviewed below): Reviewed. MD to manage while pt is not getting alternative nutrition        GI Function:  Last BM documented 10/18        Nutrition Intervention Updates: Monitor palliative care consult and goals of care     Discontinue tube feeding order as there are no plans for tube feedings at this time        Results from last 7 days   Lab Units 10/23/24  0441 10/22/24  0057 10/21/24  0934 10/20/24  2257 10/17/24  0416 10/16/24  1103   SODIUM mmol/L 138 136  --  142   < > 147*   POTASSIUM mmol/L 3.7 3.9 3.8 3.5   < > 4.1   CHLORIDE mmol/L 106 107  --  112*   < > 109*   CO2 mmol/L 21.6* 18.0*  --  19.6*   < > 25.8   BUN mg/dL 5* 5*  --  6*   < > 10   CREATININE mg/dL 0.64* 0.66*  --  0.64*   < > 1.14   CALCIUM mg/dL 9.1 8.8  --  8.5*   < > 8.8   BILIRUBIN mg/dL 0.5 0.6  --   --   --  1.1   ALK PHOS U/L 202* 207*  --   --   --  289*   ALT (SGPT) U/L 10 8  --   --   --  18   AST (SGOT) U/L 22 18  --   --   --  24   GLUCOSE mg/dL 110* 100*  --  97   < > 68    < > = values in this interval not displayed.     Results from last 7 days   Lab Units 10/23/24  0441 10/22/24  0057 10/20/24  2257 10/20/24  0827   MAGNESIUM mg/dL 1.8 1.6  --  1.7   PHOSPHORUS mg/dL 2.7 2.8   < > 2.2*   HEMOGLOBIN g/dL 10.6* 9.8*   < > 10.4*   HEMATOCRIT % 32.3* 30.0*   < > 33.3*    < > = values in this interval not displayed.     COVID19   Date Value Ref  "Range Status   10/16/2024 Not Detected Not Detected - Ref. Range Final     No results found for: \"HGBA1C\"    Malnutrition Severity Assessment      Patient meets criteria for : Moderate (non-severe) Malnutrition              RD to follow up per protocol.    Electronically signed by:  Mayda Dailey RD  10/23/24 10:22 EDT   "

## 2024-10-23 NOTE — CONSULTS
Palliative Care Social Work Progress Note    Code Status:do not resuscitate    Goals of Care: Limited Additonal Intervention     Narrative: Palliative care  met with patients jasen Zimmerman to discuss goals of care. Erasmo reports she is the medical decision maker for patient. She reports family communicantes and are all in agreeance that they would like to meet with hospice to focus on comfort. She states patient would not be interested in a feeding tube or any additional aggressive measures. She affirmed patient is a DNR/DNI. MD  updated partial to DNR/DNI. She states patient will likely need to go to a SNF under hospice care. Referral placed to [facility] hospice. CM and RN updated.     Plan: [facility] hospice referral          Isa Henderson

## 2024-10-23 NOTE — PLAN OF CARE
Goal Outcome Evaluation:  Plan of Care Reviewed With: patient        Progress: no change  Outcome Evaluation: Patient alert, not oriented. NPO. IVF infusing. VSS, blood sugar WNL. Patient remains in 4 pt restraints due to pulling at lines. Chest tube in place. Patient to possible transition to hospice. Family updated.

## 2024-10-23 NOTE — THERAPY TREATMENT NOTE
"Subjective: Pt agreeable to therapeutic plan of care. Alert, disoriented; frequently talking to others not in room.     Objective:     Precautions - high fall risk;  cognition; wrist restraints due to pulling at medical lines    Bed mobility - N/A or Not attempted.  Transfers - N/A or Not attempted.  Ambulation -  N/A or Not attempted.    Therapeutic Exercise - 10 Reps B LE hip/knee flex/ext, SLR performed lying supine, BUE AAROM and target reach.     Vitals: WNL    Pain: 6 VAS   Location: 'all over'  Intervention for pain: Repositioned    Education: Provided education on the importance of mobility in the acute care setting    Assessment: Shahram Chaney presents with functional mobility impairments which indicate the need for skilled intervention. Tolerating session today without incident. Session limited by cognition and distraction; repeatedly stating \"ok, that's enough, let someone else do a few\".  Will continue to follow and progress as tolerated. Pt appears below baseline and would benefit from SNF for continued PT.    Plan/Recommendations:   If medically appropriate, Moderate Intensity Therapy recommended post-acute care. This is recommended as therapy feels the patient would require 3-4 days per week and wouldn't tolerate \"3 hour daily\" rehab intensity. SNF would be the preferred choice. If the patient does not agree to SNF, arrange HH or OP depending on home bound status. If patient is medically complex, consider LTACH. Pt requires no DME at discharge.     Pt desires Skilled Rehab placement at discharge. Pt cooperative; agreeable to therapeutic recommendations and plan of care.         Basic Mobility 6-click:  Rollin = Total, A lot = 2, A little = 3; 4 = None  Supine>Sit:   1 = Total, A lot = 2, A little = 3; 4 = None   Sit>Stand with arms:  1 = Total, A lot = 2, A little = 3; 4 = None  Bed>Chair:   1 = Total, A lot = 2, A little = 3; 4 = None  Ambulate in room:  1 = Total, A lot = 2, A little = 3; 4 " = None  3-5 Steps with railin = Total, A lot = 2, A little = 3; 4 = None  Score: 13    Modified [place]: N/A = No pre-op stroke/TIA    Post-Tx Position: Supine with HOB Elevated, Staff Present, Alarms activated, and Call light and personal items within reach; bilateral wrist restraints in place.   PPE: gloves

## 2024-10-23 NOTE — PROGRESS NOTES
[facility] MEDICINE SERVICE  DAILY PROGRESS NOTE    NAME: Shahram Chaney  : 1955  MRN: [id number]      LOS: 6 days     PROVIDER OF SERVICE: Daniel Hodge MD    Chief Complaint: AMS (altered mental status)    Subjective:     Interval History:  History taken from: patient    No acute overnight events. Patient is pleasantly confused.    Review of Systems:   Review of Systems unable to obtain    Objective:     Vital Signs  Temp:  [97.7 °F (36.5 °C)-98 °F (36.7 °C)] 98 °F (36.7 °C)  Heart Rate:  [] 86  Resp:  [12-20] 12  BP: (129-169)/() 137/96  Flow (L/min) (Oxygen Therapy):  [3-4] 3   Body mass index is 24.07 kg/m².    Physical Exam  General Appearance:  Awake, Disoriented   Head:  Atraumatic normocephalic   Eyes:        No sclera icterus   Neck: Normal R   Pulm: Decreased breath sounds   Cardio: Tachycardiac during my eval, rhythm regular   Extremities: No remarkable edema on ble   Abdomen: Soft non tender         Musculoskeletal: Moves all extremities   Neuro: disoriented                 Scheduled Meds   amantadine, 100 mg, Nasogastric, Q12H  Barium Sulfate, 1 teaspoon(s), Oral, Once in imaging  barium sulfate, 50 mL, Oral, Once in imaging  barium sulfate, 50 mL, Oral, Once in imaging  mirtazapine, 7.5 mg, Nasogastric, Nightly       PRN Meds     acetaminophen **OR** acetaminophen **OR** acetaminophen    aluminum-magnesium hydroxide-simethicone    senna-docusate sodium **AND** polyethylene glycol **AND** bisacodyl **AND** bisacodyl    Calcium Replacement - Follow Nurse / BPA Driven Protocol    dextrose    dextrose    glucagon (human recombinant)    hydrALAZINE    labetalol    Magnesium Standard Dose Replacement - Follow Nurse / BPA Driven Protocol    melatonin    nitroglycerin    ondansetron ODT **OR** [DISCONTINUED] ondansetron    Phosphorus Replacement - Follow Nurse / BPA Driven Protocol    Potassium Replacement - Follow Nurse / BPA Driven Protocol    sodium chloride    ziprasidone  (GEODON) 20 mg in sterile water (preservative free) 1 mL injection   Infusions  dextrose, 75 mL/hr, Last Rate: 75 mL/hr (10/23/24 0710)          Diagnostic Data    Results from last 7 days   Lab Units 10/23/24  0441   WBC 10*3/mm3 7.34   HEMOGLOBIN g/dL 10.6*   HEMATOCRIT % 32.3*   PLATELETS 10*3/mm3 314   GLUCOSE mg/dL 110*   CREATININE mg/dL 0.64*   BUN mg/dL 5*   SODIUM mmol/L 138   POTASSIUM mmol/L 3.7   AST (SGOT) U/L 22   ALT (SGPT) U/L 10   ALK PHOS U/L 202*   BILIRUBIN mg/dL 0.5   ANION GAP mmol/L 10.4       XR Chest 1 View    Result Date: 10/23/2024  Impression: 1. Stable chest tube overlying the right lung apex. No discrete residual pneumothorax. 2. Clear lungs. Electronically Signed: Lico Mills MD  10/23/2024 7:11 AM EDT  Workstation ID: [id number]    US Abdomen Limited    Result Date: 10/22/2024  1. Mildly distended gallbladder with intraluminal sludge/debris. No other findings by ultrasound to suggest acute cholecystitis. Recommend clinical correlation. 2. No evidence of biliary cholestasis. Electronically Signed: Joey Tompkins MD  10/22/2024 12:40 PM EDT  Workstation ID: [id number]    XR Chest 1 View    Result Date: 10/22/2024  Impression: Stable tiny residual right apical pneumothorax status post chest tube placement. Electronically Signed: Joey Tompkins MD  10/22/2024 9:52 AM EDT  Workstation ID: [id number]       I reviewed the patient's new clinical results.    Assessment/Plan:   A 69 y.o. old male patient with PMH of abdominal aortic aneurysm and alcohol dependence anxiety hypertension monoclonal gammopathy psychosis anemia GERD presents to the hospital with complaints of altered mental status physical weakness lethargy for past few days.      Active and Resolved Problems            Active Hospital Problems     Diagnosis   POA    **AMS (altered mental status) [R41.82]   Yes    Moderate malnutrition [E44.0]   Yes    Altered mental status [R41.82]   Yes       Resolved Hospital Problems   No resolved  problems to display.         Plan:   -MRI negative for stroke, CTA head/neck: neg for acute pathologies  -On ceftriaxone for UTI, on day 3, Urine Cx: pan sensitive , blood cx neg, abx also cover possible mastoiditis   -GI following for dysphagia, currently awaiting further eval given his mental status, had PTX while placing NGT a day prior, continue D5 for now, monitor blood glucose   -Discussed with POA (brother) , he stated patient did not want to have Aggressive means of treatment, open to discussing his care with palliative for possible making him hospice  -Pulm following for PTX, s/p chest tube placement this am on 10/20/24, daily CXR  -Give diuldad for pain .25, PRN Labetalol 10 PRN, however HTN could be due to pain given recent chest tube placement   -Neuro reccs noted, Pt is agitated and pulling on lines, on restraints to avoid harm  -AM Labs    10/23:  -Palliative consult per discussion with familu, plan to remove chest tube today, appreciate Pulm help   -Code status changed to DNR/DNI.     VTE Prophylaxis:  Mechanical VTE prophylaxis orders are present.         Code status is   Code Status and Medical Interventions: No CPR (Do Not Attempt to Resuscitate); Limited Support; No intubation (DNI)   Ordered at: 10/23/24 1223     Medical Intervention Limits:    No intubation (DNI)     Code Status (Patient has no pulse and is not breathing):    No CPR (Do Not Attempt to Resuscitate)     Medical Interventions (Patient has pulse or is breathing):    Limited Support       Plan for disposition: Awaiting palliative reccs, Discussed with CM    Time: 30 minutes    Part of this note may be an electronic transcription/translation of spoken language to printed text using the Dragon Dictation System.    Signature: Electronically signed by Daniel Hodge MD, 10/23/24, 14:29 EDT.  [facility] Hospitalist Team

## 2024-10-24 ENCOUNTER — APPOINTMENT (OUTPATIENT)
Dept: GENERAL RADIOLOGY | Facility: HOSPITAL | Age: 69
End: 2024-10-24
Payer: MEDICARE

## 2024-10-24 VITALS
RESPIRATION RATE: 19 BRPM | BODY MASS INDEX: 22.49 KG/M2 | SYSTOLIC BLOOD PRESSURE: 106 MMHG | HEART RATE: 100 BPM | HEIGHT: 68 IN | DIASTOLIC BLOOD PRESSURE: 79 MMHG | WEIGHT: 148.37 LBS | OXYGEN SATURATION: 95 % | TEMPERATURE: 98.2 F

## 2024-10-24 LAB
ALBUMIN SERPL-MCNC: 2.8 G/DL (ref 3.5–5.2)
ALBUMIN/GLOB SERPL: 0.8 G/DL
ALP SERPL-CCNC: 191 U/L (ref 39–117)
ALT SERPL W P-5'-P-CCNC: 10 U/L (ref 1–41)
ANION GAP SERPL CALCULATED.3IONS-SCNC: 11.7 MMOL/L (ref 5–15)
AST SERPL-CCNC: 21 U/L (ref 1–40)
BASOPHILS # BLD AUTO: 0.08 10*3/MM3 (ref 0–0.2)
BASOPHILS NFR BLD AUTO: 1 % (ref 0–1.5)
BILIRUB SERPL-MCNC: 0.5 MG/DL (ref 0–1.2)
BUN SERPL-MCNC: 4 MG/DL (ref 8–23)
BUN/CREAT SERPL: 6.1 (ref 7–25)
CALCIUM SPEC-SCNC: 9.1 MG/DL (ref 8.6–10.5)
CHLORIDE SERPL-SCNC: 105 MMOL/L (ref 98–107)
CO2 SERPL-SCNC: 22.3 MMOL/L (ref 22–29)
CREAT SERPL-MCNC: 0.66 MG/DL (ref 0.76–1.27)
DEPRECATED RDW RBC AUTO: 48.2 FL (ref 37–54)
EGFRCR SERPLBLD CKD-EPI 2021: 100.9 ML/MIN/1.73
EOSINOPHIL # BLD AUTO: 0.12 10*3/MM3 (ref 0–0.4)
EOSINOPHIL NFR BLD AUTO: 1.4 % (ref 0.3–6.2)
ERYTHROCYTE [DISTWIDTH] IN BLOOD BY AUTOMATED COUNT: 14.6 % (ref 12.3–15.4)
GLOBULIN UR ELPH-MCNC: 3.4 GM/DL
GLUCOSE BLDC GLUCOMTR-MCNC: 105 MG/DL (ref 70–105)
GLUCOSE BLDC GLUCOMTR-MCNC: 105 MG/DL (ref 70–105)
GLUCOSE BLDC GLUCOMTR-MCNC: 121 MG/DL (ref 70–105)
GLUCOSE BLDC GLUCOMTR-MCNC: 140 MG/DL (ref 70–105)
GLUCOSE SERPL-MCNC: 128 MG/DL (ref 65–99)
HCT VFR BLD AUTO: 33.2 % (ref 37.5–51)
HGB BLD-MCNC: 10.6 G/DL (ref 13–17.7)
IMM GRANULOCYTES # BLD AUTO: 0.08 10*3/MM3 (ref 0–0.05)
IMM GRANULOCYTES NFR BLD AUTO: 1 % (ref 0–0.5)
LYMPHOCYTES # BLD AUTO: 1.36 10*3/MM3 (ref 0.7–3.1)
LYMPHOCYTES NFR BLD AUTO: 16.3 % (ref 19.6–45.3)
MAGNESIUM SERPL-MCNC: 1.9 MG/DL (ref 1.6–2.4)
MCH RBC QN AUTO: 28.5 PG (ref 26.6–33)
MCHC RBC AUTO-ENTMCNC: 31.9 G/DL (ref 31.5–35.7)
MCV RBC AUTO: 89.2 FL (ref 79–97)
MONOCYTES # BLD AUTO: 0.94 10*3/MM3 (ref 0.1–0.9)
MONOCYTES NFR BLD AUTO: 11.3 % (ref 5–12)
NEUTROPHILS NFR BLD AUTO: 5.77 10*3/MM3 (ref 1.7–7)
NEUTROPHILS NFR BLD AUTO: 69 % (ref 42.7–76)
NRBC BLD AUTO-RTO: 0 /100 WBC (ref 0–0.2)
PHOSPHATE SERPL-MCNC: 2.4 MG/DL (ref 2.5–4.5)
PLATELET # BLD AUTO: 382 10*3/MM3 (ref 140–450)
PMV BLD AUTO: 11.2 FL (ref 6–12)
POTASSIUM SERPL-SCNC: 3.3 MMOL/L (ref 3.5–5.2)
PROT SERPL-MCNC: 6.2 G/DL (ref 6–8.5)
RBC # BLD AUTO: 3.72 10*6/MM3 (ref 4.14–5.8)
SODIUM SERPL-SCNC: 139 MMOL/L (ref 136–145)
WBC NRBC COR # BLD AUTO: 8.35 10*3/MM3 (ref 3.4–10.8)

## 2024-10-24 PROCEDURE — 25010000002 LABETALOL 5 MG/ML SOLUTION: Performed by: STUDENT IN AN ORGANIZED HEALTH CARE EDUCATION/TRAINING PROGRAM

## 2024-10-24 PROCEDURE — 82948 REAGENT STRIP/BLOOD GLUCOSE: CPT | Performed by: STUDENT IN AN ORGANIZED HEALTH CARE EDUCATION/TRAINING PROGRAM

## 2024-10-24 PROCEDURE — 97530 THERAPEUTIC ACTIVITIES: CPT

## 2024-10-24 PROCEDURE — 85025 COMPLETE CBC W/AUTO DIFF WBC: CPT | Performed by: STUDENT IN AN ORGANIZED HEALTH CARE EDUCATION/TRAINING PROGRAM

## 2024-10-24 PROCEDURE — 0 DEXTROSE 5 % SOLUTION: Performed by: STUDENT IN AN ORGANIZED HEALTH CARE EDUCATION/TRAINING PROGRAM

## 2024-10-24 PROCEDURE — 97535 SELF CARE MNGMENT TRAINING: CPT

## 2024-10-24 PROCEDURE — 82948 REAGENT STRIP/BLOOD GLUCOSE: CPT

## 2024-10-24 PROCEDURE — 84100 ASSAY OF PHOSPHORUS: CPT | Performed by: STUDENT IN AN ORGANIZED HEALTH CARE EDUCATION/TRAINING PROGRAM

## 2024-10-24 PROCEDURE — 25010000002 HYDRALAZINE PER 20 MG: Performed by: INTERNAL MEDICINE

## 2024-10-24 PROCEDURE — 80053 COMPREHEN METABOLIC PANEL: CPT | Performed by: STUDENT IN AN ORGANIZED HEALTH CARE EDUCATION/TRAINING PROGRAM

## 2024-10-24 PROCEDURE — 71045 X-RAY EXAM CHEST 1 VIEW: CPT

## 2024-10-24 PROCEDURE — 25010000002 POTASSIUM CHLORIDE 10 MEQ/100ML SOLUTION: Performed by: STUDENT IN AN ORGANIZED HEALTH CARE EDUCATION/TRAINING PROGRAM

## 2024-10-24 PROCEDURE — 83735 ASSAY OF MAGNESIUM: CPT | Performed by: STUDENT IN AN ORGANIZED HEALTH CARE EDUCATION/TRAINING PROGRAM

## 2024-10-24 RX ORDER — ONDANSETRON 2 MG/ML
4 INJECTION INTRAMUSCULAR; INTRAVENOUS EVERY 6 HOURS PRN
Status: DISCONTINUED | OUTPATIENT
Start: 2024-10-24 | End: 2024-10-24 | Stop reason: HOSPADM

## 2024-10-24 RX ORDER — POTASSIUM CHLORIDE 7.45 MG/ML
10 INJECTION INTRAVENOUS
Status: COMPLETED | OUTPATIENT
Start: 2024-10-24 | End: 2024-10-24

## 2024-10-24 RX ADMIN — POTASSIUM CHLORIDE 10 MEQ: 7.46 INJECTION, SOLUTION INTRAVENOUS at 03:49

## 2024-10-24 RX ADMIN — POTASSIUM CHLORIDE 10 MEQ: 7.46 INJECTION, SOLUTION INTRAVENOUS at 05:35

## 2024-10-24 RX ADMIN — POTASSIUM CHLORIDE 10 MEQ: 7.46 INJECTION, SOLUTION INTRAVENOUS at 02:34

## 2024-10-24 RX ADMIN — HYDRALAZINE HYDROCHLORIDE 10 MG: 20 INJECTION INTRAMUSCULAR; INTRAVENOUS at 00:03

## 2024-10-24 RX ADMIN — POTASSIUM CHLORIDE 10 MEQ: 7.46 INJECTION, SOLUTION INTRAVENOUS at 06:50

## 2024-10-24 RX ADMIN — DEXTROSE MONOHYDRATE 75 ML/HR: 50 INJECTION, SOLUTION INTRAVENOUS at 11:40

## 2024-10-24 RX ADMIN — LABETALOL HYDROCHLORIDE 10 MG: 5 INJECTION, SOLUTION INTRAVENOUS at 04:11

## 2024-10-24 NOTE — PLAN OF CARE
Problem: Restraint, Nonviolent  Goal: Absence of Harm or Injury  Outcome: Not Progressing  Intervention: Implement Least Restrictive Safety Strategies  Recent Flowsheet Documentation  Taken 10/23/2024 2333 by Jean-Paul Ramos RN  Medical Device Protection:   IV pole/bag removed from visual field   torso covered   tubing secured  Diversional Activities: television  Taken 10/23/2024 2200 by Jean-Paul Ramos RN  Medical Device Protection:   IV pole/bag removed from visual field   torso covered   tubing secured  Diversional Activities: television  Taken 10/23/2024 2000 by Jean-Paul Ramos RN  Medical Device Protection:   IV pole/bag removed from visual field   torso covered   tubing secured  Diversional Activities: television  Taken 10/23/2024 1932 by Jean-Paul Ramos RN  Medical Device Protection:   IV pole/bag removed from visual field   torso covered   tubing secured  Diversional Activities: television  Intervention: Protect Dignity, Rights and Personal Wellbeing  Recent Flowsheet Documentation  Taken 10/23/2024 2000 by Jean-Paul Ramos RN  Trust Relationship/Rapport:   choices provided   care explained  Intervention: Protect Skin and Joint Integrity  Recent Flowsheet Documentation  Taken 10/23/2024 2000 by Jean-Paul Ramos RN  Body Position:   side-lying   left  Skin Protection:   drying agents applied   incontinence pads utilized   Goal Outcome Evaluation:         Pt in 2 point restraint, has been asleep throughout the shift. No complications, will continue to assess needs for restraints. APRN notified about medication not admin due to no access (ng tube). No new orders at this time. Hospice will follow patient with family.

## 2024-10-24 NOTE — THERAPY TREATMENT NOTE
"Subjective: Pt agreeable to therapeutic plan of care. Alert but lethargic, remains disoriented    Objective:     Precautions - High fall risk; cognition    Bed mobility - Mod-A  Transfers - Min-A - mod A x 2  Ambulation - 4 feet side stepping with HHA x 2      Vitals: WNL    Pain: 0 VAS   Location: n/a  Intervention for pain: N/A    Education: Transfer Training and Gait Training    Assessment: Shahram Chaney presents with functional mobility impairments which indicate the need for skilled intervention. Tolerating session today without incident. Session limited by pt cognition and lethargy. Will continue to follow and progress as tolerated. Pt would benefit from return to familiar environment of Memory Care unit.     Plan/Recommendations:   If medically appropriate, Moderate Intensity Therapy recommended post-acute care. This is recommended as therapy feels the patient would require 3-4 days per week and wouldn't tolerate \"3 hour daily\" rehab intensity. SNF would be the preferred choice. If the patient does not agree to SNF, arrange HH or OP depending on home bound status. If patient is medically complex, consider LTACH. Pt requires no DME at discharge.     Pt desires Skilled Rehab placement at discharge. Pt cooperative; agreeable to therapeutic recommendations and plan of care.         Basic Mobility 6-click:  Rollin = Total, A lot = 2, A little = 3; 4 = None  Supine>Sit:   1 = Total, A lot = 2, A little = 3; 4 = None   Sit>Stand with arms:  1 = Total, A lot = 2, A little = 3; 4 = None  Bed>Chair:   1 = Total, A lot = 2, A little = 3; 4 = None  Ambulate in room:  1 = Total, A lot = 2, A little = 3; 4 = None  3-5 Steps with railin = Total, A lot = 2, A little = 3; 4 = None  Score: 12    Modified [place]: N/A = No pre-op stroke/TIA    Post-Tx Position: Supine with HOB Elevated, Staff Present, Alarms activated, and Call light and personal items within reach  PPE: gloves         "

## 2024-10-24 NOTE — THERAPY TREATMENT NOTE
"Subjective: Pt agreeable to therapeutic plan of care.  Cognition: arousal/alertness: Alert, drowsy.  Eyes only open about 20% of session.     Objective:     Bed Mobility: Mod-A and Assist x 2   Functional Transfers: Mod-A and Assist x 2     Balance: static, with UE support, and standing Mod-A and Assist x 2  Functional Ambulation: Mod-A and Assist x 2, side steps with HHA     Grooming: Supervision  ADL Position: sitting up in bed  ADL Comments: Washing face     Vitals: WNL    Pain: 0 VAS  Location: NA  Interventions for pain: N/A  Education: Provided education on the importance of mobility in the acute care setting      Assessment: Shahram Chaney presents with ADL impairments affecting function including balance, cognition, coordination, endurance / activity tolerance, postural / trunk control, range of motion (ROM), and strength. Demonstrated functioning below baseline abilities indicate the need for continued skilled intervention while inpatient. Tolerating session today without incident. Will continue to follow and progress as tolerated.     Plan/Recommendations:   Moderate Intensity Therapy recommended post-acute care. This is recommended as therapy feels the patient would require 3-4 days per week and wouldn't tolerate \"3 hour daily\" rehab intensity. SNF would be the preferred choice. If the patient does not agree to SNF, arrange HH or OP depending on home bound status. If patient is medically complex, consider LTACH.. Pt requires no DME at discharge.     Pt desires Skilled Rehab placement at discharge. Pt cooperative; agreeable to therapeutic recommendations and plan of care.     Modified [place]: 4 = Moderately severe disability (Unable to attend to own bodily needs without assistance, and unable to walk unassisted)     Post-Tx Position: Supine with HOB Elevated, Alarms activated, and Call light and personal items within reach  PPE: gloves    "

## 2024-10-24 NOTE — NURSING NOTE
Pt potentially going hospice, family would like no invasive procedures, pt does not have an ng tube for med admin as pt continues to pull and remove. APRN notified about no med admin access, no new orders at this time.

## 2024-10-24 NOTE — PLAN OF CARE
"Goal Outcome Evaluation:              Assessment: Shahram Chaney presents with ADL impairments affecting function including balance, cognition, coordination, endurance / activity tolerance, postural / trunk control, range of motion (ROM), and strength. Demonstrated functioning below baseline abilities indicate the need for continued skilled intervention while inpatient. Tolerating session today without incident. Will continue to follow and progress as tolerated.     Plan/Recommendations:   Moderate Intensity Therapy recommended post-acute care. This is recommended as therapy feels the patient would require 3-4 days per week and wouldn't tolerate \"3 hour daily\" rehab intensity. SNF would be the preferred choice. If the patient does not agree to SNF, arrange HH or OP depending on home bound status. If patient is medically complex, consider LTACH.. Pt requires no DME at discharge.                         "

## 2024-10-24 NOTE — PLAN OF CARE
Goal Outcome Evaluation:  Plan of Care Reviewed With: patient        Progress: improving  Outcome Evaluation: Restraint was discontinued dirig the shift. Patient is able to tolerate being out of restraint.

## 2024-10-24 NOTE — PLAN OF CARE
Goal Outcome Evaluation:     ST signing off this date. SLP had conversation with pt's POA and nurse. POA verbalized to SLP she does not want to proceed with tube feedings and pt will be going hospice. SLP verbalized to POA if she changes her mind and goes through with tube feedings we can discuss dysphagia therapy. POA verbalized ST can sign off this date. Please reach out if our services are needed. Thank you.     Anticipated Discharge Disposition (SLP): unknown

## 2024-10-24 NOTE — CASE MANAGEMENT/SOCIAL WORK
"Physicians Statement of Medical Necessity for  Ambulance Transportation    GENERAL INFORMATION     Name: Shahram Chaney  YOB: 1955  Medicare #: [id number]  Transport Date: 10/24/24 (Valid for round trips this date, or for scheduled repetitive trips for 60 days from the date signed below.)  Origin: [facility]  Destination: [facility].  [address]  [place], [place].   Is the Patient's stay covered under Medicare Part A (PPS/DRG?)Yes  Closest appropriate facility? Yes  If this a hosp-hosp transfer? No  Is this a hospice patient? No    MEDICAL NECESSITY QUESTIONAIRE    Ambulance Transportation is medically necessary only if other means of transportation are contraindicated or would be potentially harmful to the patient.  To meet this requirement, the patient must be either \"bed confined\" or suffer from a condition such that transport by means other than an ambulance is contraindicated by the patient's condition.  The following questions must be answered by the healthcare professional signing below for this form to be valid:     1) Describe the MEDICAL CONDITION (physical and/or mental) of this patient AT THE TIME OF AMBULANCE TRANSPORT that requires the patient to be transported in an ambulance, and why transport by other means is contraindicated by the patient's condition: Pt has dementia with behavioral disturbances  History reviewed. No pertinent past medical history.   History reviewed. No pertinent surgical history.   2) Is this patient \"bed confined\" as defined below?Yes   To be \"bed confined\" the patient must satisfy all three of the following criteria:  (1) unable to get up from bed without assistance; AND (2) unable to ambulate;  AND (3) unable to sit in a chair or wheelchair.  3) Can this patient safely be transported by car or wheelchair van (I.e., may safely sit during transport, without an attendant or monitoring?)No   4. In addition to completing questions 1-3 above, please check any of the " following conditions that apply*:          *Note: supporting documentation for any boxes checked must be maintained in the patient's medical records Patient is confused, Need or possible need for restraints, Medical attendant required, and Unable to tolerate seated position for time needed to transport      SIGNATURE OF PHYSICIAN OR OTHER AUTHORIZED HEALTHCARE PROFESSIONAL    I certify that the above information is true and correct based on my evaluation of this patient, and represent that the patient requires transport by ambulance and that other forms of transport are contraindicated.  I understand that this information will be used by the Centers for Medicare and Medicaid Services (CMS) to support the determiniation of medical necessity for ambulance services, and I represent that I have personal knowledge of the patient's condition at the time of transport.    DS   If this box is checked, I also certify that the patient is physically or mentally incapable of signing the ambulance service's claim form and that the institution with which I am affiliated has furnished care, services or assistance to the patient.  My signature below is made on behalf of the patient pursuant to 42 .36(b)(4). In accordance with 42 .37, the specific reason(s) that the patient is physically or mentally incapable of signing the claim for is as follows: Confused/Dementia    Signature of Physician or Healthcare Professional   Esli Kc RN Date/Time:   10/24/24     (For Scheduled repetitive transport, this form is not valid for transports performed more than 60 days after this date).                                                                                                                                            --------------------------------------------------------------------------------------------  Printed Name and Credentials of Physician or Authorized Healthcare Professional     *Form must be signed by  patient's attending physician for scheduled, repetitive transports,.  For non-repetitive ambulance transports, if unable to obtain the signature of the attending physician, any of the following may sign (please select below):     Physician  Clinical Nurse Specialist x Registered Nurse     Physician Assistant  Discharge Planner  Licensed Practical Nurse     Nurse Practitioner x

## 2024-10-24 NOTE — PROGRESS NOTES
Daily Progress Note          Assessment    Spontaneous moderate right pneumothorax    AMS (altered mental status)/delirium: Due to dementia with behavioral disturbances  Aspiration pneumonia/pneumonitis    Hypoxia: ABGs on 10/16/2024 7.47/30.9/90.9  UTI: Cultures positive for gram-negative bacilli  Hypertension  Anemia  History of dysphagia status post G-tube placement.     Evaluated by SLP and diagnosed with moderate-severe dysphagia and recommended aspiration precautions        Recommendations:  Right chest tube placed 10/21/2024 and removed 10/23/2024 with resolution of pneumothorax       Respiratory status is stable on room air     Aspiration precautions  Antibiotics: Rocephin completed 10/23/2024        I personally reviewed the radiological studies               LOS: 7 days     Subjective     No apparent respiratory distress    Objective     Vital signs for last 24 hours:  Vitals:    10/24/24 0530 10/24/24 0539 10/24/24 0800 10/24/24 1100   BP: 128/97  134/97 106/79   BP Location:   Right arm Right arm   Patient Position:   Lying Lying   Pulse: 81  87 100   Resp:   23 19   Temp:   98 °F (36.7 °C) 98.2 °F (36.8 °C)   TempSrc:   Axillary Axillary   SpO2: 99%  100% 95%   Weight:  67.3 kg (148 lb 5.9 oz)     Height:           Intake/Output last 3 shifts:  No intake/output data recorded.  Intake/Output this shift:  No intake/output data recorded.      Radiology  Imaging Results (Last 24 Hours)       Procedure Component Value Units Date/Time    XR Chest 1 View [[id number]] Collected: 10/24/24 0722     Updated: 10/24/24 0728    Narrative:      XR CHEST 1 VW    Date of Exam: 10/24/2024 5:58 AM EDT    Indication: Right pneumothorax    Comparison: 10/23/2024 and prior    Findings:  Study limited by overlying support and monitoring apparatus.    There is been interval removal of the right-sided chest tube. No definite pneumothorax noted. Heart mediastinal contours are stable. Structures are unremarkable      Impression:       Impression:    1. Interval removal right-sided chest tube with no definite pneumothorax identified.      Electronically Signed: Jean-Paul Fish MD    10/24/2024 7:26 AM EDT    Workstation ID: [id number]            Labs:  Results from last 7 days   Lab Units 10/24/24  0145   WBC 10*3/mm3 8.35   HEMOGLOBIN g/dL 10.6*   HEMATOCRIT % 33.2*   PLATELETS 10*3/mm3 382     Results from last 7 days   Lab Units 10/24/24  0145   SODIUM mmol/L 139   POTASSIUM mmol/L 3.3*   CHLORIDE mmol/L 105   CO2 mmol/L 22.3   BUN mg/dL 4*   CREATININE mg/dL 0.66*   CALCIUM mg/dL 9.1   BILIRUBIN mg/dL 0.5   ALK PHOS U/L 191*   ALT (SGPT) U/L 10   AST (SGOT) U/L 21   GLUCOSE mg/dL 128*           Results from last 7 days   Lab Units 10/24/24  0145 10/23/24  0441 10/22/24  0057   ALBUMIN g/dL 2.8* 2.9* 2.7*           Results from last 7 days   Lab Units 10/22/24  1437   MILY  Negative     Results from last 7 days   Lab Units 10/24/24  0145   MAGNESIUM mg/dL 1.9                     Meds:   SCHEDULE  amantadine, 100 mg, Nasogastric, Q12H  Barium Sulfate, 1 teaspoon(s), Oral, Once in imaging  barium sulfate, 50 mL, Oral, Once in imaging  barium sulfate, 50 mL, Oral, Once in imaging  mirtazapine, 7.5 mg, Nasogastric, Nightly      Infusions  dextrose, 75 mL/hr, Last Rate: 75 mL/hr (10/24/24 1140)      PRNs    acetaminophen **OR** acetaminophen **OR** acetaminophen    aluminum-magnesium hydroxide-simethicone    senna-docusate sodium **AND** polyethylene glycol **AND** bisacodyl **AND** bisacodyl    Calcium Replacement - Follow Nurse / BPA Driven Protocol    dextrose    dextrose    glucagon (human recombinant)    hydrALAZINE    labetalol    Magnesium Standard Dose Replacement - Follow Nurse / BPA Driven Protocol    melatonin    nitroglycerin    ondansetron    Phosphorus Replacement - Follow Nurse / BPA Driven Protocol    Potassium Replacement - Follow Nurse / BPA Driven Protocol    sodium chloride    ziprasidone (GEODON) 20 mg in sterile water  (preservative free) 1 mL injection    Physical Exam:  General Appearance: Awake, answering few questions and appears no acute distress  HEENT:  Normocephalic, without obvious abnormality, Conjunctiva/corneas clear,.   Nares normal, no drainage     Neck:  Supple, symmetrical, trachea midline.   Lungs /Chest wall: Equal breath sounds bilaterally respirations unlabored, symmetrical wall movement.     Heart:  Regular rate and rhythm, S1 S2 normal  Abdomen: Soft, non-tender, no masses, no organomegaly.    Extremities: No edema, no clubbing or cyanosis     ROS  Constitutional: Negative for chills, fever and malaise/fatigue.   HENT: Negative.    Eyes: Negative.    Cardiovascular: Negative.    Respiratory: Negative for cough and shortness of breath.    Skin: Negative.    Musculoskeletal: Negative.    Gastrointestinal: Nausea  Genitourinary: Negative.    Neurological: Moving 4 extremities      I reviewed the recent clinical results  I personally reviewed the latest radiological studies    Part of this note may be an electronic transcription/translation of spoken language to printed text using the Dragon Dictation System.

## 2024-10-24 NOTE — PLAN OF CARE
Goal Outcome Evaluation:  Plan of Care Reviewed With: patient        Progress: no change     Patient resting in bed, NPO, refusing NG tube, per family and patient does not want peg tube, discharging to rehab today, plans for comfort care after rehab, md and family aware of coffee ground emesis x1, continues on D5W, able to state name, confused, questions and concerns noted.       Problem: Adult Inpatient Plan of Care  Goal: Plan of Care Review  Outcome: Progressing  Flowsheets (Taken 10/24/2024 1406)  Progress: no change  Plan of Care Reviewed With: patient  Goal: Patient-Specific Goal (Individualized)  Outcome: Progressing  Goal: Absence of Hospital-Acquired Illness or Injury  Outcome: Progressing  Intervention: Identify and Manage Fall Risk  Recent Flowsheet Documentation  Taken 10/24/2024 1200 by Ranjana Hightower RN  Safety Promotion/Fall Prevention: safety round/check completed  Taken 10/24/2024 1030 by Ranjana Hightower RN  Safety Promotion/Fall Prevention: safety round/check completed  Taken 10/24/2024 0800 by Ranjana Hightower RN  Safety Promotion/Fall Prevention:   safety round/check completed   fall prevention program maintained   mobility aid in reach   nonskid shoes/slippers when out of bed  Intervention: Prevent Infection  Recent Flowsheet Documentation  Taken 10/24/2024 0800 by Ranjana Hightower RN  Infection Prevention: personal protective equipment utilized  Goal: Optimal Comfort and Wellbeing  Outcome: Progressing  Intervention: Provide Person-Centered Care  Recent Flowsheet Documentation  Taken 10/24/2024 0800 by Ranjana Hightower RN  Trust Relationship/Rapport:   care explained   questions answered   questions encouraged   reassurance provided  Goal: Readiness for Transition of Care  Outcome: Progressing     Problem: Fall Injury Risk  Goal: Absence of Fall and Fall-Related Injury  Outcome: Progressing  Intervention: Identify and Manage Contributors  Recent Flowsheet Documentation  Taken 10/24/2024 1200 by  Ranjana Hightower RN  Medication Review/Management: medications reviewed  Taken 10/24/2024 0800 by Ranjana Hightower RN  Medication Review/Management: medications reviewed  Intervention: Promote Injury-Free Environment  Recent Flowsheet Documentation  Taken 10/24/2024 1200 by Ranjana Hightower RN  Safety Promotion/Fall Prevention: safety round/check completed  Taken 10/24/2024 1030 by Ranjana Hightower RN  Safety Promotion/Fall Prevention: safety round/check completed  Taken 10/24/2024 0800 by Ranjana Hightower RN  Safety Promotion/Fall Prevention:   safety round/check completed   fall prevention program maintained   mobility aid in reach   nonskid shoes/slippers when out of bed     Problem: Skin Injury Risk Increased  Goal: Skin Health and Integrity  Outcome: Progressing  Intervention: Optimize Skin Protection  Recent Flowsheet Documentation  Taken 10/24/2024 0800 by Ranjana Hightower RN  Pressure Reduction Devices: pressure-redistributing mattress utilized     Problem: Confusion Acute  Goal: Optimal Cognitive Function  Outcome: Progressing     Problem: Seizure, Active Management  Goal: Absence of Seizure/Seizure-Related Injury  Outcome: Progressing     Problem: Malnutrition  Goal: Improved Nutritional Intake  Outcome: Progressing  Goal: Improved Nutritional Intake  Outcome: Progressing     Problem: Electrolyte Imbalance  Goal: Electrolyte Balance  Outcome: Progressing     Problem: Hospitalized Older Adult  Goal: Optimal Cognitive Function  Outcome: Progressing  Goal: Effective Bowel Elimination  Outcome: Progressing  Goal: Optimal Coping  Outcome: Progressing  Goal: Fluid and Electrolyte Balance  Outcome: Progressing  Goal: Optimal Functional Ability  Outcome: Progressing  Goal: Improved Oral Intake  Outcome: Progressing  Goal: Adequate Sleep/Rest  Outcome: Progressing  Goal: Effective Urinary Elimination  Outcome: Progressing     Problem: Wound  Goal: Optimal Coping  Outcome: Progressing  Goal: Optimal Functional  Ability  Outcome: Progressing  Goal: Absence of Infection Signs and Symptoms  Outcome: Progressing  Goal: Improved Oral Intake  Outcome: Progressing  Goal: Optimal Pain Control and Function  Outcome: Progressing  Goal: Skin Health and Integrity  Outcome: Progressing  Intervention: Optimize Skin Protection  Recent Flowsheet Documentation  Taken 10/24/2024 0800 by Ranjana Hightower, RN  Pressure Reduction Devices: pressure-redistributing mattress utilized  Goal: Optimal Wound Healing  Outcome: Progressing     Problem: Glycemic Control Impaired  Goal: Blood Glucose Level Within Target Range  Outcome: Progressing  Goal: Minimize Hypoglycemia Risk  Outcome: Progressing     Problem: Restraint, Nonviolent  Goal: Absence of Harm or Injury  Outcome: Progressing  Intervention: Protect Dignity, Rights and Personal Wellbeing  Recent Flowsheet Documentation  Taken 10/24/2024 0800 by Ranjana Hightower, RN  Trust Relationship/Rapport:   care explained   questions answered   questions encouraged   reassurance provided                               [phone]

## 2024-10-25 NOTE — DISCHARGE SUMMARY
"             [facility]  Discharge Summary    Date of Service: 10/24/24  Patient Name: Shahram Chaney  : 1955  MRN: [id number]    Date of Admission: 10/16/2024  Discharge Diagnosis:     DNR/DNI with comfort measures - follow up with [facility]    Date of Discharge:  10/24  Primary Care Physician: Zaki Fontanez MD      Presenting Problem:   Altered mental status, unspecified altered mental status type [R41.82]  AMS (altered mental status) [R41.82]  Altered mental status [R41.82]    Active and Resolved Hospital Problems:  Active Hospital Problems    Diagnosis POA    **AMS (altered mental status) [R41.82] Yes    Moderate malnutrition [E44.0] Yes    Altered mental status [R41.82] Yes      Resolved Hospital Problems   No resolved problems to display.         Hospital Course     HPI:  Per the H&P written by Isra Mcghee MD , dated 10/16/24:  \"(altered mental status) \"    Hospital Course:   69 y.o. old male patient with PMH of abdominal aortic aneurysm and alcohol dependence anxiety hypertension monoclonal gammopathy psychosis anemia GERD presents to the hospital with complaints of altered mental status physical weakness lethargy for past few days. Patient urine drug screen is positive for benzodiazepine and TCA.  ABG without any retention.  Blood sugar is 70.  UA without urinary tract infection TSH is fine.  .  Sodium is 147.  CT head without any acute changes.  Chest x-ray without acute process.     During course of hospitalization, NGT was placed, on CXR patient noted to have PTX, NGT was removed, patient was kept on D5, Pulm consulted, chest tube was placed, upon resolution of PTX, chest tube was removed. GI was consulted for dysphagia, however patient mental status continue to worsen.     Family was reached (patient brother is the POA), who stated patient has no spouse or kids, patient has expressed his wishes to him that he would NOT like to have artificial nutritions such as " feeding tube, does not want to be intubated (DNR/DNI), Code status was changed after discussion. Palliative was consulted, recommended comfort measures and recommended discharge to facility and have hospice follow him extra care facility. Case was discussed with case management team (Elsi Kc), and was confirmed appropriately the disposition recommendations with her. Attempt to call brother prior to discharge was also made, however call went unanswered. I have notified the case management and nursing to make a call to family that he is being discharge per patient wishes as mentioned per his brother (poa) and palliative team recommendations.     It was clarified per the case management: our team Spoke to [address], and patient to be Discharged  with [facility], and diet TBD by facility.     DISCHARGE Follow Up Recommendations for labs and diagnostics:     -Follow up with [facility].       Reasons For Change In Medications and Indications for New Medications:      Day of Discharge     Vital Signs:  Temp:  [97.9 °F (36.6 °C)-98.3 °F (36.8 °C)] 98.2 °F (36.8 °C)  Heart Rate:  [] 100  Resp:  [16-23] 19  BP: (100-181)/() 106/79    Physical Exam  General Appearance:  Awake, Disoriented   Head:  Atraumatic normocephalic   Eyes:        No sclera icterus   Neck: Normal R   Pulm: Decreased breath sounds   Cardio: Tachycardiac during my eval, rhythm regular   Extremities: No remarkable edema on ble   Abdomen: Soft non tender         Musculoskeletal: Moves all extremities   Neuro: disoriented         Pertinent  and/or Most Recent Results     LAB RESULTS:      Lab 10/24/24  0145 10/23/24  0441 10/22/24  0057 10/20/24  2257 10/20/24  0827   WBC 8.35 7.34 7.35 6.25 6.74   HEMOGLOBIN 10.6* 10.6* 9.8* 9.6* 10.4*   HEMATOCRIT 33.2* 32.3* 30.0* 30.6* 33.3*   PLATELETS 382 314 251 228 259   NEUTROS ABS 5.77 4.86 4.77 4.09 4.73   IMMATURE GRANS (ABS) 0.08* 0.06* 0.04 0.04 0.03   LYMPHS ABS 1.36 1.30 1.19 1.09  0.94   MONOS ABS 0.94* 0.96* 1.10* 0.77 0.63   EOS ABS 0.12 0.11 0.20 0.21 0.35   MCV 89.2 90.7 93.5 93.0 94.1         Lab 10/24/24  0145 10/23/24  0441 10/22/24  0057 10/21/24  0934 10/20/24  2257 10/20/24  0827 10/19/24  1451 10/19/24  0217   SODIUM 139 138 136  --  142 141  --  145   POTASSIUM 3.3* 3.7 3.9 3.8 3.5 4.2   < > 3.0*   CHLORIDE 105 106 107  --  112* 111*  --  111*   CO2 22.3 21.6* 18.0*  --  19.6* 18.7*  --  24.1   ANION GAP 11.7 10.4 11.0  --  10.4 11.3  --  9.9   BUN 4* 5* 5*  --  6* 9  --  10   CREATININE 0.66* 0.64* 0.66*  --  0.64* 0.75*  --  0.76   EGFR 100.9 101.8 100.9  --  101.8 97.1  --  96.7   GLUCOSE 128* 110* 100*  --  97 115*  --  164*   CALCIUM 9.1 9.1 8.8  --  8.5* 8.9  --  9.1   MAGNESIUM 1.9 1.8 1.6  --   --  1.7  --  2.0   PHOSPHORUS 2.4* 2.7 2.8  --  3.0 2.2*   < > 2.2*    < > = values in this interval not displayed.         Lab 10/24/24  0145 10/23/24  0441 10/22/24  0057   TOTAL PROTEIN 6.2 6.1 5.9*   ALBUMIN 2.8* 2.9* 2.7*   GLOBULIN 3.4 3.2 3.2   ALT (SGPT) 10 10 8   AST (SGOT) 21 22 18   BILIRUBIN 0.5 0.5 0.6   ALK PHOS 191* 202* 207*                 Lab 10/22/24  0057 10/21/24  0934   IRON 13*  --    FERRITIN 651.00*  --    VITAMIN B 12  --  764         Brief Urine Lab Results  (Last result in the past 365 days)        Color   Clarity   Blood   Leuk Est   Nitrite   Protein   CREAT   Urine HCG        10/19/24 0716 Dark Yellow   Cloudy   Negative   Moderate (2+)   Positive   Trace                 Microbiology Results (last 10 days)       Procedure Component Value - Date/Time    Urine Culture - Urine, Urine, Clean Catch [[id number]]  (Abnormal)  (Susceptibility) Collected: 10/19/24 0716    Lab Status: Final result Specimen: Urine, Clean Catch Updated: 10/21/24 1203     Urine Culture >100,000 CFU/mL Escherichia coli    Narrative:      Colonization of the urinary tract without infection is common. Treatment is discouraged unless the patient is symptomatic, pregnant, or undergoing  an invasive urologic procedure.    Susceptibility        Escherichia coli      ESSIE      Amoxicillin + Clavulanate Susceptible      Ampicillin Susceptible      Ampicillin + Sulbactam Susceptible      Cefazolin Susceptible      Cefepime Susceptible      Ceftazidime Susceptible      Ceftriaxone Susceptible      Gentamicin Susceptible      Levofloxacin Susceptible      Nitrofurantoin Susceptible      Piperacillin + Tazobactam Susceptible      Trimethoprim + Sulfamethoxazole Susceptible                           CANDIDA AURIS PCR - Swab, Axilla Right, Axilla Left and Groin [[id number]]  (Normal) Collected: 10/16/24 2040    Lab Status: Final result Specimen: Swab from Axilla Right, Axilla Left and Groin Updated: 10/17/24 0957     JOAO AURIS PCR Not Detected    Respiratory Panel PCR w/COVID-19(SARS-CoV-2) ZELDA/ARA/RONALDO/PAD/COR/SHONDA In-House, NP Swab in UTM/VTM, 2 HR TAT - Swab, Nasopharynx [[id number]]  (Normal) Collected: 10/16/24 2018    Lab Status: Final result Specimen: Swab from Nasopharynx Updated: 10/16/24 2146     ADENOVIRUS, PCR Not Detected     Coronavirus 229E Not Detected     Coronavirus HKU1 Not Detected     Coronavirus NL63 Not Detected     Coronavirus OC43 Not Detected     COVID19 Not Detected     Human Metapneumovirus Not Detected     Human Rhinovirus/Enterovirus Not Detected     Influenza A PCR Not Detected     Influenza B PCR Not Detected     Parainfluenza Virus 1 Not Detected     Parainfluenza Virus 2 Not Detected     Parainfluenza Virus 3 Not Detected     Parainfluenza Virus 4 Not Detected     RSV, PCR Not Detected     Bordetella pertussis pcr Not Detected     Bordetella parapertussis PCR Not Detected     Chlamydophila pneumoniae PCR Not Detected     Mycoplasma pneumo by PCR Not Detected    Narrative:      In the setting of a positive respiratory panel with a viral infection PLUS a negative procalcitonin without other underlying concern for bacterial infection, consider observing off antibiotics or  discontinuation of antibiotics and continue supportive care. If the respiratory panel is positive for atypical bacterial infection (Bordetella pertussis, Chlamydophila pneumoniae, or Mycoplasma pneumoniae), consider antibiotic de-escalation to target atypical bacterial infection.    Blood Culture - Blood, Arm, Right [[id number]]  (Normal) Collected: 10/16/24 1103    Lab Status: Final result Specimen: Blood from Arm, Right Updated: 10/21/24 1115     Blood Culture No growth at 5 days    Blood Culture - Blood, Arm, Right [[id number]]  (Normal) Collected: 10/16/24 1103    Lab Status: Final result Specimen: Blood from Arm, Right Updated: 10/21/24 1115     Blood Culture No growth at 5 days            XR Chest 1 View    Result Date: 10/24/2024  Impression: Impression: 1. Interval removal right-sided chest tube with no definite pneumothorax identified. Electronically Signed: Jean-Paul Fish MD  10/24/2024 7:26 AM EDT  Workstation ID: [id number]    XR Chest 1 View    Result Date: 10/23/2024  Impression: Impression: 1. Stable chest tube overlying the right lung apex. No discrete residual pneumothorax. 2. Clear lungs. Electronically Signed: Lico Mills MD  10/23/2024 7:11 AM EDT  Workstation ID: [id number]    US Abdomen Limited    Result Date: 10/22/2024  Impression: 1. Mildly distended gallbladder with intraluminal sludge/debris. No other findings by ultrasound to suggest acute cholecystitis. Recommend clinical correlation. 2. No evidence of biliary cholestasis. Electronically Signed: Joey Tompkins MD  10/22/2024 12:40 PM EDT  Workstation ID: [id number]    XR Chest 1 View    Result Date: 10/22/2024  Impression: Impression: Stable tiny residual right apical pneumothorax status post chest tube placement. Electronically Signed: Joey Tompkins MD  10/22/2024 9:52 AM EDT  Workstation ID: [id number]    XR Chest 1 View    Result Date: 10/21/2024  Impression: Impression: Right pneumothorax has been reduced status post pleural catheter  placement. Electronically Signed: Berenice Corbin MD  10/21/2024 1:39 PM EDT  Workstation ID: [id number]    CT Angiogram Head    Result Date: 10/21/2024  Impression: Impression: Normal CT angiogram of the head. Electronically Signed: Ahsan Romero MD  10/21/2024 12:43 PM EDT  Workstation ID: [id number]    XR Chest 1 View    Result Date: 10/21/2024  Impression: Impression: Increasing now moderate to large right pneumothorax. Findings communicated with ordering provider Dr. Hodge via epic secure chat at 11:25 a.m. 10/21/2024. Electronically Signed: Joey Tompkins MD  10/21/2024 11:27 AM EDT  Workstation ID: [id number]    XR Abdomen KUB    Result Date: 10/21/2024  Impression: Impression: No acute abdominal abnormality. Electronically Signed: Aron Santos MD  10/21/2024 6:54 AM EDT  Workstation ID: [id number]    XR Chest 1 View    Result Date: 10/20/2024  Impression: Impression: 1.Stable moderate right-sided pneumothorax. 2.Mild right perihilar atelectasis. 3.Left lung scarring. Electronically Signed: Aron Santos MD  10/20/2024 10:32 PM EDT  Workstation ID: [id number]    XR Chest 1 View    Result Date: 10/20/2024  Impression: Impression: 1. New 4 cm right-sided pneumothorax. Findings were called to patient's nurse Scarlet at 4:12 p.m. on 10/20/2024. She will relay the results to Dr. Mahmood. Electronically Signed: Monico Cosby  10/20/2024 4:12 PM EDT  Workstation ID: [id number]    XR Abdomen KUB    Result Date: 10/20/2024  Impression: Impression: Enteric tube within the right mainstem bronchus. These findings were reported to the patient's nurse on 10/20/2024 at approximately 3:00 p.m. eastern standard time. She received the findings and reported that the enteric tube has already been removed. Electronically Signed: Jennifer Simpson MD  10/20/2024 2:58 PM EDT  Workstation ID: [id number]    XR Abdomen KUB    Result Date: 10/20/2024  Impression: Impression: Feeding tube tip is in the stomach. Electronically Signed: Aron Santos  MD  10/20/2024 3:05 AM EDT  Workstation ID: [id number]    XR Abdomen KUB    Result Date: 10/19/2024  Impression: Impression: Esophagogastric tube tip overlies the proximal stomach. Electronically Signed: Lico Mills MD  10/19/2024 5:49 PM EDT  Workstation ID: [id number]    CT Angiogram Carotids    Result Date: 10/18/2024  Impression: Impression: 1. Widely patent vertebral arteries bilaterally as well as the basilar artery. Both posterior cerebral arteries appear patent. 2. Mild calcific plaquing, bilateral carotid bulbs, without evidence of angiographically significant stenosis on either side, with application of NASCET criteria to this examination. 3. Additional findings, both vascular and nonvascular, as described above in detail. Electronically Signed: Chacha Chu MD  10/18/2024 12:19 PM EDT  Workstation ID: [id number]    MRI Brain With & Without Contrast    Result Date: 10/18/2024  Impression: Impression: 1. No acute intracranial findings. 2. Moderate atrophy and moderate chronic microvascular disease. 3. Right mastoid effusion. Correlate for mastoiditis. Electronically Signed: Berenice Corbin MD  10/18/2024 10:40 AM EDT  Workstation ID: [id number]    CT Head Without Contrast    Result Date: 10/16/2024  Impression: Impression: Brain atrophy with chronic small vessel ischemic changes No evidence of acute intracranial abnormality Right mastoid air cell disease Electronically Signed: Chris Martinez MD  10/16/2024 2:49 PM EDT  Workstation ID: [id number]    XR Chest 1 View    Result Date: 10/16/2024  Impression: Impression: No acute process. Electronically Signed: Tea Allen MD  10/16/2024 11:20 AM EDT  Workstation ID: [id number]    CT Head Without Contrast    Result Date: 10/6/2024  Impression: No acute intracranial pathology. No cervical spine fracture. Electronically Signed: Shahram Rush MD  10/6/2024 5:14 PM EDT  Workstation ID: [id number]    CT Cervical Spine Without Contrast    Result Date: 10/6/2024  Impression: No  acute intracranial pathology. No cervical spine fracture. Electronically Signed: Shahram Rush MD  10/6/2024 5:14 PM EDT  Workstation ID: [id number]     Results for orders placed during the hospital encounter of 10/16/24    Duplex Venous Lower Extremity - Right CAR    Interpretation Summary    Normal right lower extremity venous duplex scan.      Results for orders placed during the hospital encounter of 10/16/24    Duplex Venous Lower Extremity - Right CAR    Interpretation Summary    Normal right lower extremity venous duplex scan.          Labs Pending at Discharge:      Procedures Performed           Consults:   Consults       Date and Time Order Name Status Description    10/22/2024 12:39 PM Inpatient Palliative Care MD Consult Completed     10/20/2024 11:25 PM Inpatient Psychiatrist Consult      10/20/2024  4:22 PM Inpatient Pulmonology Consult Completed     10/20/2024 12:47 PM Inpatient Gastroenterology Consult      10/16/2024  5:07 PM Inpatient Neurology Consult General Completed     10/16/2024  4:38 PM Inpatient Psychiatrist Consult Completed     10/16/2024  3:44 PM Hospitalist (on-call MD unless specified)                Discharge Details        Discharge Medications        Continue These Medications        Instructions Start Date   amantadine 100 MG tablet  Commonly known as: SYMMETREL   1 tablet, Oral, Every 12 Hours Scheduled      aspirin 81 MG chewable tablet   81 mg, Oral, Daily      Ativan 1 MG tablet  Generic drug: LORazepam   1 tablet, Oral, Every 6 Hours PRN      atorvastatin 20 MG tablet  Commonly known as: LIPITOR   1 tablet, Oral, Nightly      hydrALAZINE 25 MG tablet  Commonly known as: APRESOLINE   1 tablet, Oral, Every 6 Hours PRN      loperamide 2 MG tablet  Commonly known as: IMODIUM A-D   2 mg, Oral, Every 6 Hours PRN      losartan 50 MG tablet  Commonly known as: COZAAR   1 tablet, Oral, Every 12 Hours Scheduled      Melatonin 5 MG capsule   1 capsule, Oral, Every 24 Hours      mirtazapine  7.5 MG tablet  Commonly known as: REMERON   7.5 mg, Oral, Nightly      pantoprazole 40 MG EC tablet  Commonly known as: PROTONIX   1 tablet, Oral, Every 24 Hours      spironolactone 25 MG tablet  Commonly known as: ALDACTONE   12.5 mg, Oral, Daily      Tylenol 325 MG tablet  Generic drug: acetaminophen   2 tablets, Oral, Every 6 Hours PRN               No Known Allergies      Discharge Disposition:   Skilled Nursing Facility (DC - External)    Diet:  Hospital:  Diet Order   Procedures    NPO Diet NPO Type: Strict NPO         Discharge Activity:         CODE STATUS:  Code Status and Medical Interventions: No CPR (Do Not Attempt to Resuscitate); Limited Support; No intubation (DNI)   Ordered at: 10/23/24 1223     Medical Intervention Limits:    No intubation (DNI)     Code Status (Patient has no pulse and is not breathing):    No CPR (Do Not Attempt to Resuscitate)     Medical Interventions (Patient has pulse or is breathing):    Limited Support         No future appointments.        Time spent on Discharge including face to face service:  >30 minutes    Part of this note may be an electronic transcription/translation of spoken language to printed text using the Dragon Dictation System.    Signature: Electronically signed by Daniel Hodge MD, 10/24/24, 20:11 EDT.  [facility] Hospitalist Team

## 2024-10-25 NOTE — CASE MANAGEMENT/SOCIAL WORK
Case Management Discharge Note      Final Note: Jerman Bueno         Selected Continued Care - Discharged on 10/24/2024 Admission date: 10/16/2024 - Discharge disposition: Skilled Nursing Facility (DC - External)      Destination Coordination complete.      Service Provider Services Address Phone Fax Patient Preferred    [address] [facility] [address] [facility] [place] [address] [phone] --                 Transportation Services  Ambulance: [facility]    Final Discharge Disposition Code: 03 - skilled nursing facility (SNF)